# Patient Record
Sex: FEMALE | Race: WHITE | NOT HISPANIC OR LATINO | Employment: FULL TIME | URBAN - METROPOLITAN AREA
[De-identification: names, ages, dates, MRNs, and addresses within clinical notes are randomized per-mention and may not be internally consistent; named-entity substitution may affect disease eponyms.]

---

## 2017-03-01 DIAGNOSIS — Z30.41 ENCOUNTER FOR SURVEILLANCE OF CONTRACEPTIVE PILLS: ICD-10-CM

## 2017-03-01 RX ORDER — NORGESTIMATE AND ETHINYL ESTRADIOL 7DAYSX3 28
1 KIT ORAL DAILY
Qty: 84 TABLET | Refills: 0 | Status: SHIPPED | OUTPATIENT
Start: 2017-03-01 | End: 2017-05-22

## 2017-03-01 NOTE — TELEPHONE ENCOUNTER
Last office visit 2/25/16  Future appointment currently not scheduled.  BP Readings from Last 2 Encounters:   06/24/16 130/77   06/21/16 125/71     Medication is being filled for 1 time refill only due to:  Patient needs to be seen because it has been more than one year since last visit.   Left message for patient to return call to clinic.   Patient due for annual visit.  Prescription refilled for 1 fill.    Charity Connell   [facility]  RN

## 2017-05-22 DIAGNOSIS — Z30.41 ENCOUNTER FOR SURVEILLANCE OF CONTRACEPTIVE PILLS: ICD-10-CM

## 2017-05-22 NOTE — TELEPHONE ENCOUNTER
2/25/16 last office visit   Niecy refill generated.   Patient did not schedule follow up appointment.  Routing refill request to provider for review/approval because:  Niecy given x1 and patient did not follow up, please advise  Patient needs to be seen because it has been more than 1 year since last office visit.    Please review and advise.  Thank you.    Charity Connell   [facility]  RN

## 2017-05-23 RX ORDER — NORGESTIMATE AND ETHINYL ESTRADIOL 7DAYSX3 28
1 KIT ORAL DAILY
Qty: 84 TABLET | Refills: 0 | Status: SHIPPED | OUTPATIENT
Start: 2017-05-23 | End: 2017-05-26

## 2017-05-23 NOTE — TELEPHONE ENCOUNTER
Call to pt to notify of below.  Unable to reach.  Left message for pt to call back     Charity Connell   [facility]  RN

## 2017-05-26 RX ORDER — NORGESTIMATE AND ETHINYL ESTRADIOL 7DAYSX3 28
1 KIT ORAL DAILY
Qty: 84 TABLET | Refills: 0 | Status: SHIPPED | OUTPATIENT
Start: 2017-05-26 | End: 2017-08-18

## 2017-08-18 DIAGNOSIS — Z30.41 ENCOUNTER FOR SURVEILLANCE OF CONTRACEPTIVE PILLS: ICD-10-CM

## 2017-08-18 RX ORDER — NORGESTIMATE AND ETHINYL ESTRADIOL 7DAYSX3 28
1 KIT ORAL DAILY
Qty: 84 TABLET | Refills: 0 | Status: SHIPPED | OUTPATIENT
Start: 2017-08-18 | End: 2021-08-27

## 2017-08-18 NOTE — TELEPHONE ENCOUNTER
Last office visit 2/25/16  Last prescription 5/26/17  Patient bumped from appointment 8/3/17  Future appointment 9/19/17  Will refill med to get patient through to next appointment.    BP Readings from Last 2 Encounters:   06/24/16 130/77   06/21/16 125/71       Charity Connell   [facility]  RN

## 2017-09-19 ENCOUNTER — OFFICE VISIT (OUTPATIENT)
Dept: OBGYN | Facility: CLINIC | Age: 27
End: 2017-09-19
Payer: COMMERCIAL

## 2017-09-19 VITALS
WEIGHT: 173.4 LBS | HEIGHT: 64 IN | DIASTOLIC BLOOD PRESSURE: 76 MMHG | SYSTOLIC BLOOD PRESSURE: 132 MMHG | BODY MASS INDEX: 29.6 KG/M2 | HEART RATE: 97 BPM

## 2017-09-19 DIAGNOSIS — Z01.419 ENCOUNTER FOR GYNECOLOGICAL EXAMINATION WITHOUT ABNORMAL FINDING: Primary | ICD-10-CM

## 2017-09-19 DIAGNOSIS — Z30.09 FAMILY PLANNING COUNSELING: ICD-10-CM

## 2017-09-19 PROCEDURE — 99395 PREV VISIT EST AGE 18-39: CPT | Performed by: OBSTETRICS & GYNECOLOGY

## 2017-09-19 NOTE — NURSING NOTE
"Chief Complaint   Patient presents with     Physical       Initial /76 (BP Location: Right arm, Patient Position: Sitting, Cuff Size: Adult Regular)  Pulse 97  Ht 5' 4\" (1.626 m)  Wt 173 lb 6.4 oz (78.7 kg)  LMP 09/02/2017  Breastfeeding? No  BMI 29.76 kg/m2 Estimated body mass index is 29.76 kg/(m^2) as calculated from the following:    Height as of this encounter: 5' 4\" (1.626 m).    Weight as of this encounter: 173 lb 6.4 oz (78.7 kg).  Medication Reconciliation: complete   Vannessa Nelson CMA      "

## 2017-09-19 NOTE — PROGRESS NOTES
SUBJECTIVE:   CC: Melody aBron is an 27 year old woman who presents for preventive health visit.     Thinking of getting pregnant in the next year, has been  for about a year.  Has been on OCPs since her teens with regular periods.      Had all her vaccines as a child, had chicken pox as a child.    Healthy Habits:    Do you get at least three servings of calcium containing foods daily (dairy, green leafy vegetables, etc.)? yes    Amount of exercise or daily activities, outside of work: 2 day(s) per week    Problems taking medications regularly No    Medication side effects: No    Have you had an eye exam in the past two years? yes    Do you see a dentist twice per year? yes    Do you have sleep apnea, excessive snoring or daytime drowsiness?no          Family planning    Today's PHQ-2 Score: PHQ-2 ( 1999 Pfizer) 9/19/2017 2/25/2016   Q1: Little interest or pleasure in doing things 0 0   Q2: Feeling down, depressed or hopeless 0 0   PHQ-2 Score 0 0         Abuse: Current or Past(Physical, Sexual or Emotional)- No  Do you feel safe in your environment - Yes  Social History   Substance Use Topics     Smoking status: Never Smoker     Smokeless tobacco: Never Used     Alcohol use Yes      Comment: occ     The patient does not drink >3 drinks per day nor >7 drinks per week.    Reviewed orders with patient.  Reviewed health maintenance and updated orders accordingly - Yes  BP Readings from Last 3 Encounters:   09/19/17 132/76   06/24/16 130/77   06/21/16 125/71    Wt Readings from Last 3 Encounters:   09/19/17 173 lb 6.4 oz (78.7 kg)   04/15/16 155 lb (70.3 kg)   02/25/16 168 lb (76.2 kg)                      Mammogram not appropriate for this patient based on age.  Mammo discussed, not appropriate for or declined by this patient.    Pertinent mammograms are reviewed under the imaging tab.  History of abnormal Pap smear:   NO - age 21-29 PAP every 3 years recommended  Last 3 Pap Results:   PAP (no units)  "  Date Value   02/25/2016 NIL   10/31/2012 NIL   11/14/2011 NIL       Reviewed and updated as needed this visit by clinical staffTobacco  Allergies  Meds         Reviewed and updated as needed this visit by Provider              ROS:  C: NEGATIVE for fever, chills, change in weight  I: NEGATIVE for worrisome rashes, moles or lesions  E: NEGATIVE for vision changes or irritation  ENT: NEGATIVE for ear, mouth and throat problems  R: NEGATIVE for significant cough or SOB  B: NEGATIVE for masses, tenderness or discharge  CV: NEGATIVE for chest pain, palpitations or peripheral edema  GI: NEGATIVE for nausea, abdominal pain, heartburn, or change in bowel habits  : NEGATIVE for unusual urinary or vaginal symptoms. Periods are regular.  M: NEGATIVE for significant arthralgias or myalgia  N: NEGATIVE for weakness, dizziness or paresthesias  P: NEGATIVE for changes in mood or affect    OBJECTIVE:   /76 (BP Location: Right arm, Patient Position: Sitting, Cuff Size: Adult Regular)  Pulse 97  Ht 5' 4\" (1.626 m)  Wt 173 lb 6.4 oz (78.7 kg)  LMP 09/02/2017  Breastfeeding? No  BMI 29.76 kg/m2  EXAM:  GENERAL: healthy, alert and no distress  EYES: Eyes grossly normal to inspection, PERRL and conjunctivae and sclerae normal  HENT: normal cephalic/atraumatic and oral mucous membranes moist  NECK: no adenopathy, no asymmetry, masses, or scars and thyroid normal to palpation  RESP: lungs clear to auscultation - no rales, rhonchi or wheezes  BREAST: normal without masses, tenderness or nipple discharge and no palpable axillary masses or adenopathy  CV: regular rate and rhythm, normal S1 S2, no S3 or S4, no murmur, click or rub, no peripheral edema and peripheral pulses strong  ABDOMEN: soft, nontender, no hepatosplenomegaly, no masses and bowel sounds normal   (female): normal female external genitalia, normal urethral meatus, vaginal mucosa pink, moist, well rugated, and normal cervix/adnexa/uterus without masses or " "discharge  MS: no gross musculoskeletal defects noted, no edema  SKIN: no suspicious lesions or rashes  NEURO: Normal strength and tone, mentation intact and speech normal  PSYCH: mentation appears normal, affect normal/bright    ASSESSMENT/PLAN:   1. Encounter for gynecological examination without abnormal finding  Declines STI screening.  Pap smear up to date.  Mammogram not indicated today due to age.  Colonoscopy not indicated today due to age.  Labs not due today.  Immunizations up to date.     2. Family planning counseling  Recommend PNV when stopping OCPs.  On no other meds.  Discussed weight control, activity.  Had all immunizations as a child and chicken pox.      COUNSELING:   Reviewed preventive health counseling, as reflected in patient instructions       Regular exercise       Healthy diet/nutrition       Contraception       Family planning       Safe sex practices/STD prevention         reports that she has never smoked. She has never used smokeless tobacco.    Estimated body mass index is 29.76 kg/(m^2) as calculated from the following:    Height as of this encounter: 5' 4\" (1.626 m).    Weight as of this encounter: 173 lb 6.4 oz (78.7 kg).   Weight management plan: Discussed healthy diet and exercise guidelines and patient will follow up in 12 months in clinic to re-evaluate.    Counseling Resources:  ATP IV Guidelines  Pooled Cohorts Equation Calculator  Breast Cancer Risk Calculator  FRAX Risk Assessment  ICSI Preventive Guidelines  Dietary Guidelines for Americans, 2010  USDA's MyPlate  ASA Prophylaxis  Lung CA Screening    Nichole Horne MD  [facility]  "

## 2017-09-19 NOTE — MR AVS SNAPSHOT
After Visit Summary   9/19/2017    Melody Baron    MRN: [id number]           Patient Information     Date Of Birth          1990        Visit Information        Provider Department      9/19/2017 4:00 PM Nichole Horne MD [facility]        Today's Diagnoses     Encounter for gynecological examination without abnormal finding    -  1    Family planning counseling          Care Instructions      Preventive Health Recommendations  Female Ages 26 - 39  Yearly exam:   See your health care provider every year in order to    Review health changes.     Discuss preventive care.      Review your medicines if you your doctor has prescribed any.    Until age 30: Get a Pap test every three years (more often if you have had an abnormal result).    After age 30: Talk to your doctor about whether you should have a Pap test every 3 years or have a Pap test with HPV screening every 5 years.   You do not need a Pap test if your uterus was removed (hysterectomy) and you have not had cancer.  You should be tested each year for STDs (sexually transmitted diseases), if you're at risk.   Talk to your provider about how often to have your cholesterol checked.  If you are at risk for diabetes, you should have a diabetes test (fasting glucose).  Shots: Get a flu shot each year. Get a tetanus shot every 10 years.   Nutrition:     Eat at least 5 servings of fruits and vegetables each day.    Eat whole-grain bread, whole-wheat pasta and brown rice instead of white grains and rice.    Talk to your provider about Calcium and Vitamin D.     Lifestyle    Exercise at least 150 minutes a week (30 minutes a day, 5 days of the week). This will help you control your weight and prevent disease.    Limit alcohol to one drink per day.    No smoking.     Wear sunscreen to prevent skin cancer.    See your dentist every six months for an exam and cleaning.            Follow-ups after your visit        Who to contact   "   If you have questions or need follow up information about today's clinic visit or your schedule please contact [facility] directly at [phone].  Normal or non-critical lab and imaging results will be communicated to you by MyChart, letter or phone within 4 business days after the clinic has received the results. If you do not hear from us within 7 days, please contact the clinic through [facility] or phone. If you have a critical or abnormal lab result, we will notify you by phone as soon as possible.  Submit refill requests through [employer] or call your pharmacy and they will forward the refill request to us. Please allow 3 business days for your refill to be completed.          Additional Information About Your Visit        [facility] Information     [employer] gives you secure access to your electronic health record. If you see a primary care provider, you can also send messages to your care team and make appointments. If you have questions, please call your primary care clinic.  If you do not have a primary care provider, please call [phone] and they will assist you.        Care EveryWhere ID     This is your Care EveryWhere ID. This could be used by other organizations to access your [place] medical records  [id number]        Your Vitals Were     Pulse Height Last Period Breastfeeding? BMI (Body Mass Index)       97 5' 4\" (1.626 m) 09/02/2017 No 29.76 kg/m2        Blood Pressure from Last 3 Encounters:   09/19/17 132/76   06/24/16 130/77   06/21/16 125/71    Weight from Last 3 Encounters:   09/19/17 173 lb 6.4 oz (78.7 kg)   04/15/16 155 lb (70.3 kg)   02/25/16 168 lb (76.2 kg)              Today, you had the following     No orders found for display       Primary Care Provider Office Phone # Fax #    Nader Ribera [phone] [address] [facility] [address]        Equal Access to Services     MANOLO KO AH: evelyn To, " elida hills ah. So [facility] [phone].    ATENCIÓN: Si xochitl marie, tiene a manuel disposición servicios gratuitos de asistencia lingüística. Teodora al [phone].    We comply with applicable federal civil rights laws and [place] laws. We do not discriminate on the basis of race, color, national origin, age, disability sex, sexual orientation or gender identity.            Thank you!     Thank you for choosing [facility]  for your care. Our goal is always to provide you with excellent care. Hearing back from our patients is one way we can continue to improve our services. Please take a few minutes to complete the written survey that you may receive in the mail after your visit with us. Thank you!             Your Updated Medication List - Protect others around you: Learn how to safely use, store and throw away your medicines at www.disposemymeds.org.          This list is accurate as of: 9/19/17  4:59 PM.  Always use your most recent med list.                   Brand Name Dispense Instructions for use Diagnosis    doxycycline Monohydrate 100 MG Caps     120 capsule    Take 1 capsule (100 mg) by mouth 2 times daily (with meals)    Acne vulgaris       MULTIVITAL Tabs     30 tablet    1 tablet daily        mupirocin 2 % ointment    BACTROBAN    22 g    Apply daily to wound.    Allergic contact dermatitis, unspecified cause       norgestim-eth estrad triphasic 0.18/0.215/0.25 MG-35 MCG per tablet    ORTHO TRI-CYCLEN (28)    84 tablet    Take 1 tablet by mouth daily Last refill until clinic appointment.    Encounter for surveillance of contraceptive pills       * triamcinolone 0.5 % cream    KENALOG    15 g    Apply sparingly to affected area three times daily.        * triamcinolone 0.5 % Oint ointment    KENALOG    15 g    Apply sparingly twice daily to rash as needed.    Allergic contact dermatitis due to adhesives       vitamin B complex with  vitamin C Tabs tablet      Take 1 tablet by mouth daily        VITAMIN D PO      Take  by mouth. 1 tablet daily, unknown dose        * Notice:  This list has 2 medication(s) that are the same as other medications prescribed for you. Read the directions carefully, and ask your doctor or other care provider to review them with you.

## 2019-11-02 ENCOUNTER — HEALTH MAINTENANCE LETTER (OUTPATIENT)
Age: 29
End: 2019-11-02

## 2020-02-10 ENCOUNTER — HEALTH MAINTENANCE LETTER (OUTPATIENT)
Age: 30
End: 2020-02-10

## 2020-11-16 ENCOUNTER — HEALTH MAINTENANCE LETTER (OUTPATIENT)
Age: 30
End: 2020-11-16

## 2021-05-07 ENCOUNTER — IMMUNIZATION (OUTPATIENT)
Dept: FAMILY MEDICINE | Facility: CLINIC | Age: 31
End: 2021-05-07
Payer: COMMERCIAL

## 2021-05-07 PROCEDURE — 0011A PR COVID VAC MODERNA 100 MCG/0.5 ML IM: CPT

## 2021-05-07 PROCEDURE — 91301 PR COVID VAC MODERNA 100 MCG/0.5 ML IM: CPT

## 2021-06-04 ENCOUNTER — IMMUNIZATION (OUTPATIENT)
Dept: FAMILY MEDICINE | Facility: CLINIC | Age: 31
End: 2021-06-04
Attending: FAMILY MEDICINE
Payer: COMMERCIAL

## 2021-06-04 PROCEDURE — 0012A PR COVID VAC MODERNA 100 MCG/0.5 ML IM: CPT

## 2021-06-04 PROCEDURE — 91301 PR COVID VAC MODERNA 100 MCG/0.5 ML IM: CPT

## 2021-08-27 ENCOUNTER — LAB (OUTPATIENT)
Dept: LAB | Facility: CLINIC | Age: 31
End: 2021-08-27
Attending: ADVANCED PRACTICE MIDWIFE
Payer: COMMERCIAL

## 2021-08-27 ENCOUNTER — PRENATAL OFFICE VISIT (OUTPATIENT)
Dept: MIDWIFE SERVICES | Facility: CLINIC | Age: 31
End: 2021-08-27
Payer: COMMERCIAL

## 2021-08-27 ENCOUNTER — HOSPITAL ENCOUNTER (OUTPATIENT)
Dept: ULTRASOUND IMAGING | Facility: CLINIC | Age: 31
End: 2021-08-27
Attending: ADVANCED PRACTICE MIDWIFE
Payer: COMMERCIAL

## 2021-08-27 ENCOUNTER — DOCUMENTATION ONLY (OUTPATIENT)
Dept: MIDWIFE SERVICES | Facility: CLINIC | Age: 31
End: 2021-08-27

## 2021-08-27 VITALS
HEIGHT: 64 IN | SYSTOLIC BLOOD PRESSURE: 122 MMHG | DIASTOLIC BLOOD PRESSURE: 74 MMHG | HEART RATE: 76 BPM | BODY MASS INDEX: 33.97 KG/M2 | WEIGHT: 199 LBS

## 2021-08-27 DIAGNOSIS — Z34.01 ENCOUNTER FOR SUPERVISION OF NORMAL FIRST PREGNANCY IN FIRST TRIMESTER: Primary | ICD-10-CM

## 2021-08-27 DIAGNOSIS — Z34.01 ENCOUNTER FOR SUPERVISION OF NORMAL FIRST PREGNANCY IN FIRST TRIMESTER: ICD-10-CM

## 2021-08-27 LAB
ABO/RH(D): NORMAL
ANTIBODY SCREEN: NEGATIVE
ERYTHROCYTE [DISTWIDTH] IN BLOOD BY AUTOMATED COUNT: 13.2 % (ref 10–15)
HBA1C MFR BLD: 5.1 %
HCT VFR BLD AUTO: 33.7 % (ref 35–47)
HGB BLD-MCNC: 11.4 G/DL (ref 11.7–15.7)
HIV 1+2 AB+HIV1 P24 AG SERPL QL IA: NEGATIVE
MCH RBC QN AUTO: 29.3 PG (ref 26.5–33)
MCHC RBC AUTO-ENTMCNC: 33.8 G/DL (ref 31.5–36.5)
MCV RBC AUTO: 87 FL (ref 78–100)
PLATELET # BLD AUTO: 279 10E3/UL (ref 150–450)
RBC # BLD AUTO: 3.89 10E6/UL (ref 3.8–5.2)
SPECIMEN EXPIRATION DATE: NORMAL
WBC # BLD AUTO: 7.2 10E3/UL (ref 4–11)

## 2021-08-27 PROCEDURE — 86901 BLOOD TYPING SEROLOGIC RH(D): CPT

## 2021-08-27 PROCEDURE — 99204 OFFICE O/P NEW MOD 45 MIN: CPT | Performed by: ADVANCED PRACTICE MIDWIFE

## 2021-08-27 PROCEDURE — 83036 HEMOGLOBIN GLYCOSYLATED A1C: CPT

## 2021-08-27 PROCEDURE — 87591 N.GONORRHOEAE DNA AMP PROB: CPT | Performed by: ADVANCED PRACTICE MIDWIFE

## 2021-08-27 PROCEDURE — 36415 COLL VENOUS BLD VENIPUNCTURE: CPT

## 2021-08-27 PROCEDURE — 87389 HIV-1 AG W/HIV-1&-2 AB AG IA: CPT

## 2021-08-27 PROCEDURE — 87491 CHLMYD TRACH DNA AMP PROBE: CPT | Performed by: ADVANCED PRACTICE MIDWIFE

## 2021-08-27 PROCEDURE — 86803 HEPATITIS C AB TEST: CPT

## 2021-08-27 PROCEDURE — 85027 COMPLETE CBC AUTOMATED: CPT

## 2021-08-27 PROCEDURE — 87086 URINE CULTURE/COLONY COUNT: CPT | Performed by: ADVANCED PRACTICE MIDWIFE

## 2021-08-27 PROCEDURE — 86762 RUBELLA ANTIBODY: CPT

## 2021-08-27 PROCEDURE — 99207 PR FIRST OB VISIT: CPT | Performed by: ADVANCED PRACTICE MIDWIFE

## 2021-08-27 PROCEDURE — 76801 OB US < 14 WKS SINGLE FETUS: CPT

## 2021-08-27 PROCEDURE — 87624 HPV HI-RISK TYP POOLED RSLT: CPT | Performed by: ADVANCED PRACTICE MIDWIFE

## 2021-08-27 PROCEDURE — 86780 TREPONEMA PALLIDUM: CPT

## 2021-08-27 PROCEDURE — 87340 HEPATITIS B SURFACE AG IA: CPT

## 2021-08-27 PROCEDURE — G0123 SCREEN CERV/VAG THIN LAYER: HCPCS | Performed by: ADVANCED PRACTICE MIDWIFE

## 2021-08-27 RX ORDER — FOLIC ACID/MULTIVIT,IRON,MINER 0.4MG-18MG
TABLET ORAL
COMMUNITY
Start: 2021-07-17 | End: 2023-09-18

## 2021-08-27 ASSESSMENT — MIFFLIN-ST. JEOR: SCORE: 1584.72

## 2021-08-27 NOTE — PROGRESS NOTES
PRENATAL VISIT   FIRST OBSTETRICAL EXAM - OB    Assessment / Impression     First prenatal visit at 11w3d    BMI 34      Plan:       - IOB labs drawn., Accepts GC/CT screening,  Pap smear screening performed today and hgbA1C  -Pt is low risk and not interested in drawing lead level.  -Reviewed prenatal care schedule and discussed routine OB visits versus problem visits and referrals. Also discussed use of ultrasound in pregnancy.  -ordered early US; reviewed dating. Dating by LMP.   -Optimal nutrition and weight gain discussed. Pregnancy weight gain of 11-20 lbs (BMI 30.0 or 34.9) encouraged.   -Clinical history/risk factors requiring antepartum OB consult: none.   -Preeclampsia risk factors:    High risk factors (1+):  none.     Moderate risk factors (2+):  none, first pregnancy and BMI greater than 30    Based on her risk factors, Melody is NOT at high risk of preeclampsia. Low-dose aspirin prophylaxis is NOT recommended for prevention of preeclampsia.  -Antepartum VTE risk factors none absent.  -Reviewed genetic carrier screening. Patient considering: .  -Reviewed genetic aneuploidy screening options. Patient  considering: . Encouraged to call insurance to verify coverage.  -The following referrals were given to patient for follow up: .   -Reviewed MyChart, lab results, and how lab results are disseminated.   -The patient has the following risk factors for overt diabetes: Body mass index greater than or equal to 25 kg/m2. Plus the additional risk factor(s) of: No additional risk factors.    -IOB packet given and reviewed with patient, discussed standards of care, scope of practice, clinic and hospital settings, also reviewed clinic versus emergency phone number.   -Reviewed importance of regular exercise in pregnancy and help with low back pain and other pregnancy symptoms.   -Discussed importance of taking aprenatal vitamin with the goal of having 400 mcg of folic acid. Additionally taking a Vitamin D  supplement (1,000-2,000 IU/day) and an Omega 3/fish oil/DHA is beneficial. Research also supports taking 150 mcg of Iodine when pregnant.  -Discussed baby friend hospitals, policies, and recommendations regarding breastfeeding as well as professional and community support. Discussed the benefits of breastfeeding including: decreased childhood obesity or diabetes, decreased recurrence of ear infections, and decreased chance of hospitalization for respiratory conditions  Additionally, giving  formula instead of breast milk can affect the mother's supply. And formula alters the natural growth of good bacteria in the 's stomach.   -Anticipatory guidance for common pregnancy questions and concerns reviewed.   -Danger s/sx for this trimester reviewed with patient.  -CNM services and hospital options reviewed; emergency and scheduling phone numbers given to patient.  -Return to clinic 4-6 weeks    Time spent:  Chart review/Pre-charting on Date: 21: 5 minutes  Face-to-face visit: 45 minutes with >50% on education, counseling and coordination of care.   Documentation:  5 minutes  Total time spent on day of service:  55 minutes       Subjective:      Melody Babcock is a 31 year old  here today for her First Obstetrical Exam.  She is new to [place] and [facility]. Open to pregnancy for past couple years.   Off OCPs for several years, 28 day cycles, mild sx.   Noting nausea, occasional vomiting.   Notes chronic urinary urgency.    Notes referred by her best friend and sister.  Reports lactose intolerance with diarrhea; managing with increased hydration and pedialyte. Resolves within 1 day.    Exercise: 1 day a week, walking  Safety (seatbelt, gun access, IPV, hx abuse): negative screen  Tobacco/Alcohol/Drug Use: none  EPDS today: 3, neg #10  Sexual Partners: 1 male, spouse  Last Breast Exam: 5 years ago    Education level: [employer] grad  Occupation:   Partners name: Timoteo    OB History     "Para Term  AB Living   1 0 0 0 0 0   SAB TAB Ectopic Multiple Live Births   0 0 0 0 0      # Outcome Date GA Lbr Darvin/2nd Weight Sex Delivery Anes PTL Lv   1 Current                Expected Date of Delivery: 3/15/2022, by Last Menstrual Period    Past Medical History:   Diagnosis Date     Allergic urticaria      Past Surgical History:   Procedure Laterality Date     AS BIOPSY SKIN/SUBQ/MUC MEM, SINGLE LESION      mole removal     HC TOOTH EXTRACTION W/FORCEP       Social History     Tobacco Use     Smoking status: Never Smoker     Smokeless tobacco: Never Used   Substance Use Topics     Alcohol use: Not Currently     Comment: occ     Drug use: No     Current Outpatient Medications   Medication Sig Dispense Refill     Docosahexaenoic Acid (PRENATAL DHA) 200 MG capsule        Prenatal Vit-Fe Fumarate-FA (PRENATAL PO)        Allergies   Allergen Reactions     Sulfa Drugs      Vicodin [Acetaminophen]      Adhesive Tape Rash     Bacitracin Rash     Patient developed rash with use of bacitracin and had positive reaction with true test     Nickel Rash     Positive reaction with true test.             Pregnancy Risk Factors: see problem list    Review of Systems  General:  Denies problem  Eyes: Denies problem  Ears/Nose/Throat: Denies problem  Cardiovascular: Denies problem  Respiratory:  Denies problem  Gastrointestinal:  Denies problem  Genitourinary: Denies problem  Musculoskeletal:  Denies problem  Skin: Denies problem  Neurologic: Denies problem  Psychiatric: Denies problem  Endocrine: Denies problem  Heme/Lymphatic: Denies problem   Allergic/Immunologic: Denies problem       Objective:   Objective    Vitals:    21 1014   BP: 122/74   Pulse: 76   Weight: 90.3 kg (199 lb)   Height: 1.613 m (5' 3.5\")     Physical Exam:  General Appearance: Alert, cooperative, no distress, appears stated age  Head: Normocephalic, without obvious abnormality, atraumatic  Eyes: Conjunctiva/corneas clear, does not wear corrective " lenses  Neck: Supple, symmetrical, trachea midline, no adenopathy  Thyroid: not enlarged, symmetric, no tenderness/mass/nodules  Back: Symmetric, no curvature, ROM normal, no CVA tenderness  Lungs: Clear to auscultation bilaterally, respirations unlabored  Heart: Regular rate and rhythm, S1 and S2 normal, no murmur, rub, or gallop  Breasts:  deferred   Abdomen: Soft, non-tender, no masses. Gravid to 11  FHT: 175 bpm   Pelvic exam: External genitalia normal without lesions or irritation. Vagina and cervix with small amount thin white homogenous discharge show no lesions, inflammation, or tenderness. No cystocele, No rectocele. Uterus & adnexal normal without masses or tenderness pap and GC/CT samples obtained   Extremities: Extremities normal, atraumatic, no cyanosis or edema  Skin: Skin color, texture, turgor normal, no rashes or lesions  Lymph nodes: Cervical, supraclavicular, and axillary nodes normal  Neurologic: Alert and oriented x 3.    Lab: No results found for any visits on 08/27/21.

## 2021-08-27 NOTE — PATIENT INSTRUCTIONS
"Welcome to [facility]   and thank you for choosing us for your maternity care provider!  Congratulations!    Meet the Midwives from [facility]  You are invited to an informational meet and greet with [facility]'s [facility] Certified Nurse-Midwives. Our free \"Meet the Midwives\" event is a great opportunity to learn about our midwives' philosophy and experience, the hospitals where we can assist with your birth, and answer questions you may have. Partners, friends, and family are welcome to attend. Currently, this is a virtual event.  Date  First Tuesday of every month at 7 pm.    Link to next (live) meeting  [email or web address]  To Join by Telephone (audio only) Call:   [phone] Phone Conference ID: [phone]#    Contact information:  Appointment line and to get a hold of CNM in clinic Monday-Friday 8 am - 5 pm:  [phone].  There are some clinics with early start times (1st appointment 7:40 am) and others with evening hours (last appointment 6:20 pm).  Most are typically open from 8 am to 5 pm.    CNM on call answering service: [phone].  Specify your hospital of choice and leave a brief message for CNM;  will then page CNM who is on call at your specified hospital and you should receive a call back with 15 minutes.  Be sure that your ringer is audible and that you can accept blocked calls so that we can get back in touch with you! This number should be reserved for urgent needs if during the day, before 8 am, after 5 pm, weekends, holidays.      Pregnancy: Body Changes  From conception (fertilization) until after the birth of your child, you and your baby will change every day. To help you understand what is happening, we ve outlined how pregnancy begins and some of the changes you may notice.  How Pregnancy Begins  Conception is the union of a sperm and an egg. " When it occurs, your baby s genetic makeup is complete, even its sex. Fertilization takes place in the fallopian tube. The fertilized egg then travels down this tube to the uterus (womb). The egg attaches to the lining of the uterus about a week later. There it grows and is nourished.    Your Changing Body  Pregnancy affects almost every part of your body. You may notice some of the following physical and emotional changes:    Your uterus expands outward and upward as your baby grows. You may feel pressure on your bladder, stomach, and other organs.    You may notice skin color changes on your forehead, nose, and cheeks. A dark line may form from your bellybutton down to your pubic area. The skin color around your nipples and thighs may also change.    Pink stretch marks may appear on your abdomen, breasts, or hips.    Your hair may seem thicker. You lose less hair during pregnancy.    You may feel fine one day and weepy the next. This is caused by changes in your body, such as increased hormones (chemicals that affect the function of certain organs and also your moods).      Adapting to Pregnancy: First Trimester  As your body adjusts, you may have to change or limit your daily activities. You ll need more rest. You may also need to use the energy you have more wisely.  Eat stomach-friendly foods like cottage cheese, crackers, or bread throughout the day.    Your Changing Body  Almost every part of your body is affected as you adapt to pregnancy. The uterus and cervix will begin to soften right away. You may not look very pregnant during the first three months. But you are likely to have some common signs of early pregnancy:    Nausea    Fatigue    Frequent urination    Mood swings    Bloating of the abdomen    Missed or light periods (first trimester bleeding)    Nipple or breast tenderness, breast swelling      It s Not Too Late to Start Good Habits  What matters most is protecting your baby from this moment on.  If you smoke, drink alcohol, or use drugs, now is the time to stop. If you need help, talk with your health care provider.    Smoking increases the risk of stillbirth or having a low-birth-weight baby. If you smoke, quit now.    Alcohol and drugs have been linked with miscarriage, birth defects, intellectual disability, and low birth weight. Do not drink alcohol or take drugs.    Tips to Relieve Nausea  Although nausea can occur at any time of the day, it may be worse in the morning. To help prevent nausea:    Eat small, light meals at frequent intervals.    Get up slowly. Eat a few unsalted crackers before you get out of bed.    Drink water with lemon slices.    Eat an ice pop in your favorite flavor.    Ask your health care provider about taking angus or vitamin B6 for nausea and vomiting.    Talk with your health care provider if you take vitamins that upset your stomach.    Work Concerns  The end of the first trimester is a good time to discuss working during pregnancy with your employer. Follow your health care provider s advice if your job requires you to stand for a long time, work with hazardous tools, or even sit at a desk all day. Your workspace, workload, or scheduled hours may need to be adjusted. Perhaps you can change body postures more often or take an extra break.  Advice for Travel  Talk to your health care provider first, but the second trimester may be the best time for any travel. You may be advised to avoid certain trips while you re pregnant. Food and water can be concerns in developing countries. Travel by car is a good choice, as you can stop, get out, and stretch. Bring snacks and water along. Fasten the lap belt below your belly, low over your hips. Also be sure to wear the shoulder harness.  Intimacy  Unless your health care provider tells you to, there is no reason to stop having sex while you re pregnant. You or your partner may notice changes in desire. Desire may be less in the first  trimester, due to nausea and fatigue. In the second trimester, sex may be very enjoyable. The third trimester can be a challenge comfort-wise. Try different positions and see what s best for you both.      Pregnancy: Your First Trimester Changes  The first trimester is a time of rapid development for your baby. Because your baby is growing so quickly, it is important that you start a healthy lifestyle right away. By the end of the first trimester, your baby has formed all of its major body organs and weighs just over an ounce.    Month 1 (Weeks 1-4)  The placenta (the organ that nourishes your baby) begins to form. The heart and lungs begin to develop. Your baby is about 1/4 inch long by the end of the first month.    Month 2 (Weeks 5-8)  All of your baby s major body organs form. The face, fingers, toes, ears, and eyes appear. By the end of the month, your baby is about 1 inch long.    Month 3 (Weeks 9-12)  Your baby can open and close its fists and mouth. The sexual organs begin to form. As the first trimester ends, your baby is about 4 inches long.      Pregnancy: Your Weight  Being a healthy weight is important for both you and your baby. The weight you gain now is not just extra fat. It is also the weight of your baby. And it is the increased blood and fluids to support the baby. A slow, steady rate of gain is best. How much you should gain depends on your weight before getting pregnant. Check with your health care provider to find out what is right for you.    If You Gain Too Much  Gaining too much weight might cause you to feel tired or you could have a harder pregnancy or birth. If you and your health care provider decide you re gaining too much:    Eat fewer fats and sugars. Instead, eat fruit, vegetables, and whole-grain foods.    Drink plenty of water between meals.    Get at least 20 minutes of light exercise, such as walking, each day.    Don t diet. You might not get enough of the nutrients you or your  baby needs.    Keep a diet diary to help you gauge what and how much you are eating .    If You re Not Gaining Enough  If you don t gain enough, your baby could be too small or have health problems. Women tend to gain most of their weight in the second and third trimesters. For now:    Eat many types of foods. Make sure you get enough calcium, protein, and carbohydrates.    Don t skip meals.    Eat healthy snacks.    Pick nutrient-dense, high calorie healthy food like trail mix or protein shakes.    See a dietitian for help.    Talk to your healthcare provider if you have had an eating disorder or problems with certain foods.      Pregnancy: Common Questions  There are plenty of myths and  old wives  tales  surrounding pregnancy. You may need help  fact from fiction. On this sheet, you ll find answers to a few common questions. If you have other questions, talk with a midwife.    Will Working Harm My Baby?  In most cases, working throughout your pregnancy is not harmful at all. There may be concerns if the job involves dangerous machinery or chemicals, lifting, or standing for very long periods of time. Talk to your health care provider and employer about your particular job and pregnancy.  Why Can t I Change the Cat Litter Box?  Cats carry a disease called toxoplasmosis. In adult humans, it shows up as a mild infection of the blood and organs. If you are infected during pregnancy, the baby s brain and eyes could be damaged. To be safe, have someone else change the litter. If you must handle it, wear a paper mask over your nose and mouth. Also, wear gloves and wash your hands afterward.  Which Medications Are Safe?  No prescription or over-the-counter drug is safe for everyone all of the time. But sometimes medications are needed. Be sure your health care provider knows you are pregnant. Then use only the medications he or she advises you to take. Please refer to the below resources for further information  and discuss concern and questions with your midwife.  Is It True That I Can Overheat My Baby?  Yes. To avoid making your baby too warm:    Don t sit in a jacuzzi. A long, warm bath is fine, but not in water over 100 F.    Exercise less intensely if you feel fatigued. Base your workout on how you feel, not your heart rate. Heart rates aren t a good way to measure effort during pregnancy.  Can I Lift and Carry Safely?  Yes, if your health care provider doesn t tell you otherwise. Learn to lift and carry safely to avoid injury and reduce back pain during pregnancy. To protect your back:    Bend at the knees to bring the load nearer.    Get a good . Test the weight of the load.    Tighten your abdomen. Exhale as you lift.    Lift with your legs, not with your back.    Carry the load close to your body.    Hold the load so you can see where you are going.  What If I Get Sick?  Most women get sick at least once during pregnancy. Talk with your health care provider if you do. Most likely it will not affect your pregnancy. Get plenty of rest and fluids, and eat what you can. Talk to your health care provider before taking any medications.        HEALTHY PREGNANCY CARE: 10-14 WEEKS PREGNANT     By weeks 10 to 14 of your pregnancy, the placenta has formed inside your uterus. It may be possible to hear your baby's heartbeat with a doppler ultrasound device. Your baby's eyes can and do move. The arms and legs can bend.    GENETIC SCREENING OPTIONS AT [facility]                All testing is optional. We don t recommend or discourage any test; it is totally up to you and your partner. Some couples wish to know their risk of having a baby with a genetic defect and others do not. We will support your decision. Abnormal results may lead to a discussion of options for further testing.    Accurate dating of your pregnancy is important for all testing so an ultrasound may be done prior to referral or testing.    No testing  provides certainty; there are false positives and negatives associated with all testing, some more than others.    Most genetic testing is non-invasive (requires only a blood sample and sometimes an ultrasound or both).    It is always wise to check with your insurance carrier before proceeding.    Some testing can be done at our lab and some require a referral.    If you decide to do no testing, the 20 week ultrasound scan, which is a routine or standard ultrasound, has some ability to detect abnormalities in the baby, and identifies obstetric problems.    If you are over 35, you will have the option of a Level II ultrasound, which is a more detailed and targeting scan, that helps detect fetal anomalies as well as obstetric problems.      If you have a more complex family history of chromosomal abnormalities, a referral to a genetic counselor and/or a Maternal Fetal Medicine specialist, to help identify available tests, may be recommended.    TYPES OF GENETIC TESTING AVAILABLE INCLUDE:    Carrier Screening/Testing for Genetic Conditions    There are many inherited conditions for which testing or carrier testing is available. Carrier screening can test for conditions like Cystic Fibrosis, Thalassemia, Jj Sachs, Sickle Cell, Hemophilia, Muscular Dystrophy, Ephraim s disease and many others.     Cystic Fibrosis (CF) affects both males and females and people from all racial and ethnic groups. However, the disease is most common among Caucasians of Northern  descent. CF is also common among Latinos and American Indians. The disease is less common among  Americans and  Americans. More than 10 million Americans are carriers of a faulty CF gene and many of them don't know that they are CF carriers. One or both parents can be tested any time before or during pregnancy.    Talk to your care provider about your family history and whether you should be screened. A referral to a genetic counselor at  [facility] or [facility] can be made by your care provider at any time.    This is also tested for in the  Metabolic Screen that your infant receives 24 hours after birth.     Fetal DNA cell Testing: [place] or Innatal (Non-Invasive Prenatal Testing)    At 10 wk or greater, a blood sample can be drawn here at clinic or at any of our referral offices. It will provide highly accurate results with low false positive rates for trisomy 18, trisomy 21 (Down Syndrome), and trisomy 13 (> 99% trisomy detection rate at a false positive rate of <0.1%). Gender identification can also be obtained if desired (98% accuracy).  Can also detect some sex-linked chromosomal abnormalities (80-90% accuracy).  This is often done if one of the other screening tests is abnormal.        1st Trimester Screening:     Everyone has an age related risk of having a baby with a genetic abnormality. This testing provides a risk profile which is better than assigning risk based solely on your age.    Refines your risk of having a baby with a chromosomal abnormality such as Trisomy 21 & 18.    This test with detect a fetus with one of these disorders about 85% of the time.    A thickened nuchal fold can also be associated with cardiac defects.    This test requires a blood collection combined with ultrasound to obtain a measurement of fluid at back of baby s neck (nuchal translucency).    False positive results occur approximately 5% of cases.    An additional blood sample may be necessary in order to calculate your risk of having a baby with a neural tube defect (e.g. spina bifida).  This is called the AFP test (alpha fetal protein).  An ultrasound should be able to  any spinal defect as well.    Follow-up of an abnormal test may include a more extensive ultrasound study and you may be offered an amniocentesis (a small sample of amniotic fluid is withdrawn and studied) for a definitive diagnosis    Quad Screen  (4-marker screen)    Between 15 and 21 weeks, a sample of blood can be drawn at our lab to assess your risk of having a baby with Down Syndrome, Trisomy 18 and neural tube defects. Such testing is able to detect these conditions in 80% of cases and the false-positive rate is approximately 5%.     Follow-up of an abnormal test may include a more extensive ultrasound study and you may be offered an amniocentesis (a small sample of amniotic fluid is withdrawn and studied) for a definitive diagnosis      Referrals opportunities include:    [facility],  [facility]  o Offers nuchal translucency ultrasound; does not offer genetic counseling.    [facility] and [facility]):   o Approximately an hour long visit includes 30 min with genetic counselor who discusses all testing available and which ones might be beneficial to you based on age, personal and family history.    o Blood will be drawn and the nuchal translucency ultrasound will be discussed and performed if desired. The Free Fetal DNA testing ([place]/[employer]) can be drawn also.  o Targeted or detailed Level II ultrasounds are also available with these perinatology groups.        Breastfeeding: a Healthy Option for You and Your Baby  Consider breastfeeding for the healthiest way to feed your baby. Ask your midwife or physician for more information.     The choice of how you will feed your baby is important.  Before your baby s birth, you ll want to learn about the benefits of breastfeeding.  [facility] have been designated Baby Friendly; an initiative that was created by the World Health Organization and UNICEF.  This helps give you and your baby the best start in feeding their baby.    Why should I breastfeed my baby?    Babies are less likely to develop childhood obesity or diabetes    Babies are less likely to suffer from recurrent ear infections    Babies are less likely  to be hospitalized for respiratory conditions    Breast milk is rich in nutrients and antibodies-it is easy to digest    How does it benefit me?    Lowers the risk for diabetes, breast and ovarian cancer and postpartum depression    Moms can lose  baby weight  more quickly    Cost savings - formula can cost well over $1,500 per year    Convenient - no bottles and nipples to sterilize, no measuring and mixing formula    The physical contact with breastfeeding can make babies feel secure, warm and comforted     What about formula?  While you and your baby are staying with us at [facility], we will support whatever feeding choice you make for your baby.    Some important considerations:      The American Academy of Pediatrics, the World Health Organization, and many more organizations recommend exclusive breastfeeding for 6 months and continued breastfeeding while adding other foods for the first 1-2 years.      Any amount of breastmilk has benefits to both baby and mother.    Giving formula in replacement of breastfeeding can affect mother s milk supply.  If formula is needed, hospital staff will work with you on a plan to help develop your milk supply.    Formula alters the natural growth of good bacteria in the  stomach.     Research has found that first time mothers who offer formula in the hospital have a shorter duration of breastfeeding.    How can I start to prepare?     Start by having a conversation with your medical provider.     Talk with your partner, family and friends.     Attend a prenatal class that includes breastfeeding preparation. Birth and breastfeeding classes are offered by [facility]. Visit [employer] for class information.     After your baby s birth, hospital staff and lactation consultants will help you and your baby get off to a great start with breastfeeding.    As your center of gravity and weight changes, use good body mechanics when changing positions and  lifting. For example, use a straight back and your legs for support when lifting instead of bending over. Maintain good posture to prevent straining your muscles. Now is a good time to continue or restart your exercise program. Walking 30-60 minutes daily is an excellent way to keep fit. Yoga and swimming also offer many benefits.    The nausea and fatigue of early pregnancy have usually started to let up, so this is a good time to focus on nutrition. Consider attending a nutrition class. A healthy diet includes about 60 grams of protein each day (3-4 servings of dairy, 2-3 servings of meat/fish/poultry/nuts), 4-6 servings of whole grain foods, and 5-6 servings of fruits and vegetables. Remember to drink 6-8 glasses of water daily.    Watch for warning signs, such as     vaginal bleeding    fluid leaking from your vagina    severe abdominal pain    nausea and vomiting more than 4-5 times a day, or if you are unable to keep anything down    fever more than 100.4 degrees F.       RESOURCES   You can refer to the Starting Out Right book or find it online at [email or web address] or [email or web address]/images/stories/flipbooks/[email or web address]-starting-out-[email or web address]-starting-out-right.html#p=8    You can sign up for a weekly parenting e-mail that gives support, tips and advice from health care professionals that starts with pregnancy and continues through the toddler years. To register, go to [email or web address] at any time during your pregnancy.    Breastfeeding:    OUTPATIENT LACTATION RESOURCES     -Schedule an appointment with a [facility] Nurse Midwives [facility] who is also a Lactation Consultant by calling [phone]. We see women for breastfeeding visits at [facility] and [facility].     -Baby Café    Pregnant and interested in breastfeeding?  Need answers to breastfeeding questions?  Want to help  breastfeeding moms?  Already breastfeeding and want to meet other moms?    Join us at the Baby Café!    Baby Cafe is a free, drop-in service offering breast-feeding support for pregnant women, breast-feeding mothers and their families.  Come share tips and socialize with other mothers.  Babies and siblings are welcome (no childcare available).    Starting April 2018, Baby Café will be at 4 locations.  Please see below for the Baby Café closest to you! Hmong, Sinhala, and Belgian which is may be available at some sites.      [facility]  [address] [place] [address]  1st Wednesday: 10am-12pm    [facility]  [address] [place] [address]  3rd Wednesday 4-6pm    [facility]  [address] [place] [address]  4th Wednesday 10am-12:30pm    [facility]  [address] [place] [address]  4th Wednesdays: 4-6pm    -Attend a baby weigh in at [facility].  Lactation consultants are available to answer questions  [place]: Tuesdays 1:00 - 2:00  [facility]: Mondays 1:00 - 2:00   www.[employer]Tagoodies    -Attend one of the New Mama groups at [facility] in [facility].  [facility] also offers one-on-one in home and in office lactation consults.   [email or web address]    -Attend a [facility] meeting.  Multiple groups in several locations throughout the [facility]. The meetings are no-cost and always informative breastfeeding education session through [employer] La Leche League  [email or web address].[email or web address]/     Held at [facility] the second Thursday of each month at 7pm    Childbirth and Parenting Education:   [place] parenting [place]: http://[employer]Tagoodies/   (565) 436-BABY  [place]: (education, yoga & wellness) www.[employer].Tagoodies  [facility]: www.[employer]ma[employer] Devices   Childbirth collective: (Parent topic nights)  www.childbirthcollective.org/  Hypnobabies:  www.hypnobabiestwincities.com/  Hypnobirthing:   Http://Weftng.com/  The Birth Hour: https://theGlider.Weilos/online-childbirth-class/    APPS and Podcasts:   Jossy Wood  The Birth Hour (for birth stories)   Birthful   Expectful   The Longest Shortest Time  PregnancyPodcast Yarely Briandacolleen  Book Recommendations:   Elizabeth Hellen's Birthing From Within--first few chapters include a new-age tone, you may prefer to skip it and keep going, because there is good stuff later.  This book recommendation covers emotional preparation, but does cover coping with pain, and use of both pharmacological and nonpharmacological methods.    Dr. Hogan' The Pregnancy Book and The Birth Book--the pregnancy book goes month-by month    Womanly Art of Breastfeeding by La L[employer]   Bestfeeding by Elisa Lu--great pictures    Mothering Your Nursing Toddler, by Mabel Talamantes.   Addresses dealing with so many of the challenging behaviors of a nursing toddler.  How Weaning Happens, by La Leche League.  Discusses weaning at all ages, from medically necessary weaning of an infant, all the way up to age 5 (or older), with why/why not, and strategies.  Very empowering book both for deciding to wean and deciding not to.    American College of Nurse-Midwives (ACNM) http://www.midwife.org/; look at the informational handouts at http://www.midwife.org/Share-With-Women     www.mymidwife.org    Mother to Baby (Medication and Herbal guidance in pregnancy): http://www.mothertobaby.org  Toll-Free Hotline: 511.954.5562  LactMed (Medication use while breastfeeding): http://toxnet.nlm.nih.gov/newtoxnet/lactmed.htm    Women's Health.gov:  http://www.womenshealth.gov/a-z-topics/index.html    American pregnancy association - http://americanpregnancy.org    Centering Pregnancy (group prenatal care option): http://centeringhealthcare.org    Information about doulas:  Childbirth collective: http://www.childbirthcollective.org/  [facility] [place] ([place]):   "www.marcellus.org  [facility]  project: http://BirdDog Solutionstiesdoulaproject.com/     Early Childhood and Family Education (ECFE):  ECFE offers parents hands-on learning experiences that will nourish a lifetime of teachable moments.  http://ecfe.info/ecfe-home/    [employer] www.D[employer]     FDA - Nutrition  www.mypyramid.gov  Under \"For Consumers,\" click on \"pregnant and breastfeeding women.\"      Centers for Disease Control and Prevention (CDC) - Vaccines : http://www.cdc.gov/vaccines/       When researching information on the web, question the validity of websites.  The [employer] .gov, .Yoyocard and[employer] Softwareorg tend to be more reliable information.  If there are a lot of advertisements, be cautious of the information provided. Stay away from blogs and chat rooms please!      Nutrition & supplements:     Prenatal vitamin (those with 600-1000 mcg folic acid and 27 mg of iron are enough).  Take with food or Juice     4-5 servings of dairy or other calcium rich foods (fish, leafy greens, soy) per day - if not, take 500-1000 mg additional calcium (Tums, pills, chews). Take with dairy     Vitamin D3 2801-9094 IU geltab daily.  Take with fattiest meal.  Look for fortified foods also (Dairy, Juice)     2-3 (4) oz servings of fish, seafood, nuts (walnuts & almonds), oils, avocado per week - if not, take Omega 3 Fatty acids: DHA & CASS 3140-9562 mg per day.  Other names: cod liver oil, fish oil. Take with fattiest meal.  Some prenatals have DHA, but typically not a sufficient dose.    Fish: Do not eat shark, swordfish, magen mackerel, or tilefish when you are pregnant or breastfeeding.  They contain high levels of mercury.  Limit white (albacore) tuna to no more than 6 ounces per week. Http://www.fda.gov/downloads/ForConsumers/ConsumerUpdates/NYA581773.pdf         Touring the Maternity Care Center  At this time we are offering a virtual tour of the Maternity Care Centers at both [facility] and [facility]:   [facility]: " [email or web address]://[email or web address]/Maternity-[email or web address]  [place]:   [email or web address]/the-birthplace/tours  https://[email or web address]/[facility]/Maternity-Care-Center/#[email or web address]_tour  When in person tours become available, registrations is required. To schedule a tour at either [facility] or [facility], please do so online using the following links:  [facility] - [email or web address]  [facility] - [email or web address]     You are invited to  Meet the [facility] Nurse Midwives [facility]    Virtual:   [email or web address]    A way to tour the hospital Labor and Delivery unit and meet the midwives in our group was postponed at the start of hospital restrictions following COVID-19. We will resume these when able.    Please call [phone] for ongoing updates.

## 2021-08-28 LAB
BACTERIA UR CULT: NO GROWTH
HBV SURFACE AG SERPL QL IA: NONREACTIVE
T PALLIDUM AB SER QL: NEGATIVE

## 2021-08-29 LAB
C TRACH DNA SPEC QL PROBE+SIG AMP: NEGATIVE
N GONORRHOEA DNA SPEC QL NAA+PROBE: NEGATIVE

## 2021-08-30 LAB
HCV AB SERPL QL IA: NEGATIVE
RUBV IGG SERPL QL IA: POSITIVE

## 2021-08-31 LAB
HUMAN PAPILLOMA VIRUS 16 DNA: NEGATIVE
HUMAN PAPILLOMA VIRUS 18 DNA: NEGATIVE
HUMAN PAPILLOMA VIRUS FINAL DIAGNOSIS: NORMAL
HUMAN PAPILLOMA VIRUS OTHER HR: NEGATIVE

## 2021-09-03 LAB
BKR LAB AP GYN ADEQUACY: NORMAL
BKR LAB AP GYN INTERPRETATION: NORMAL
BKR LAB AP HPV REFLEX: NORMAL
BKR LAB AP PREVIOUS ABNORMAL: NORMAL
PATH REPORT.COMMENTS IMP SPEC: NORMAL
PATH REPORT.RELEVANT HX SPEC: NORMAL

## 2021-09-12 ENCOUNTER — HEALTH MAINTENANCE LETTER (OUTPATIENT)
Age: 31
End: 2021-09-12

## 2021-09-13 ENCOUNTER — OFFICE VISIT (OUTPATIENT)
Dept: FAMILY MEDICINE | Facility: CLINIC | Age: 31
End: 2021-09-13
Payer: COMMERCIAL

## 2021-09-13 VITALS
HEIGHT: 64 IN | DIASTOLIC BLOOD PRESSURE: 60 MMHG | HEART RATE: 88 BPM | BODY MASS INDEX: 33.32 KG/M2 | OXYGEN SATURATION: 97 % | SYSTOLIC BLOOD PRESSURE: 100 MMHG | WEIGHT: 195.2 LBS

## 2021-09-13 DIAGNOSIS — Z13.220 LIPID SCREENING: Primary | ICD-10-CM

## 2021-09-13 LAB
CHOLEST SERPL-MCNC: 267 MG/DL
FASTING STATUS PATIENT QL REPORTED: YES
HDLC SERPL-MCNC: 51 MG/DL
LDLC SERPL CALC-MCNC: 186 MG/DL
TRIGL SERPL-MCNC: 150 MG/DL

## 2021-09-13 PROCEDURE — 99203 OFFICE O/P NEW LOW 30 MIN: CPT | Performed by: FAMILY MEDICINE

## 2021-09-13 PROCEDURE — 36415 COLL VENOUS BLD VENIPUNCTURE: CPT | Performed by: FAMILY MEDICINE

## 2021-09-13 PROCEDURE — 80061 LIPID PANEL: CPT | Performed by: FAMILY MEDICINE

## 2021-09-13 ASSESSMENT — MIFFLIN-ST. JEOR: SCORE: 1567.48

## 2021-09-13 NOTE — PROGRESS NOTES
SUBJECTIVE:   CC: Melody Babcock is an 31 year old woman who presents for preventive health visit.     {Split Bill scripting  The purpose of this visit is to discuss your medical history and prevent health problems before you are sick. You may be responsible for a co-pay, coinsurance, or deductible if your visit today includes services such as checking on a sore throat, having an x-ray or lab test, or treating and evaluating a new or existing condition :743232}  Patient has been advised of split billing requirements and indicates understanding: Yes  Healthy Habits:     Getting at least 3 servings of Calcium per day:  Yes    Bi-annual eye exam:  Yes    Dental care twice a year:  Yes    Sleep apnea or symptoms of sleep apnea:  None    Diet:  Regular (no restrictions)    Frequency of exercise:  2-3 days/week    Duration of exercise:  30-45 minutes    Taking medications regularly:  Yes    Medication side effects:  Not applicable    PHQ-2 Total Score: 0    Additional concerns today:  No          {additional problems to add (Optional):627899}    Today's PHQ-2 Score:   PHQ-2 ( 1999 Pfizer) 9/10/2021   Q1: Little interest or pleasure in doing things 0   Q2: Feeling down, depressed or hopeless 0   PHQ-2 Score 0   Q1: Little interest or pleasure in doing things Not at all   Q2: Feeling down, depressed or hopeless Not at all   PHQ-2 Score 0       Abuse: Current or Past (Physical, Sexual or Emotional) - No  Do you feel safe in your environment? Yes    Have you ever done Advance Care Planning? (For example, a Health Directive, POLST, or a discussion with a medical provider or your loved ones about your wishes): No, advance care planning information given to patient to review.  {:152749}    Social History     Tobacco Use     Smoking status: Never Smoker     Smokeless tobacco: Never Used   Substance Use Topics     Alcohol use: Not Currently     Alcohol/week: 0.0 standard drinks     Comment: Pregnant, so not drinking     If  "you drink alcohol do you typically have >3 drinks per day or >7 drinks per week? Not applicable    Alcohol Use 9/10/2021   Prescreen: >3 drinks/day or >7 drinks/week? Not Applicable   Prescreen: >3 drinks/day or >7 drinks/week? -   {add AUDIT responses (Optional) (A score of 7 for adult men is an indication of hazardous drinking; a score of 8 or more is an indication of an alcohol use disorder.  A score of 7 or more for adult women is an indication of hazardous drinking or an alchohol use disorder):092129}    Reviewed orders with patient.  Reviewed health maintenance and updated orders accordingly - { :011005::\"Yes\"}  {Chronicprobdata (optional):303917}    Breast Cancer Screening:  Any new diagnosis of family breast, ovarian, or bowel cancer? No    FHS-7: No flowsheet data found.  click delete button to remove this line now  {AMB Mammogram Decision Support (Optional) :772692}  Pertinent mammograms are reviewed under the imaging tab.    History of abnormal Pap smear: { :725480}  PAP / HPV Latest Ref Rng & Units 8/27/2021 2/25/2016 10/31/2012   PAP   Negative for Intraepithelial Lesion or Malignancy (NILM) - -   PAP (Historical) - - NIL NIL   HPV16 Negative Negative - -   HPV18 Negative Negative - -   HRHPV Negative Negative - -     Reviewed and updated as needed this visit by clinical staff  Tobacco  Allergies  Meds              Reviewed and updated as needed this visit by Provider                {HISTORY OPTIONS (Optional):567085}    Review of Systems  {FEMALE ROS (Optional):102814}     OBJECTIVE:   /60 (BP Location: Left arm, Patient Position: Left side, Cuff Size: Adult Large)   Pulse 88   Ht 1.613 m (5' 3.5\")   Wt 88.5 kg (195 lb 3.2 oz)   LMP 06/08/2021 (Exact Date)   SpO2 97%   BMI 34.04 kg/m    Physical Exam  {Exam Choices (Optional):367593}    {Diagnostic Test Results (Optional):516726::\"Diagnostic Test Results:\",\"Labs reviewed in Epic\"}    ASSESSMENT/PLAN:   {Diag Picklist:604741}    Patient " "has been advised of split billing requirements and indicates understanding: {YES / NO:672079::\"Yes\"}  COUNSELING:  {FEMALE COUNSELING MESSAGES:512263::\"Reviewed preventive health counseling, as reflected in patient instructions\"}    Estimated body mass index is 34.04 kg/m  as calculated from the following:    Height as of this encounter: 1.613 m (5' 3.5\").    Weight as of this encounter: 88.5 kg (195 lb 3.2 oz).    {Weight Management Plan (ACO) Complete if BMI is abnormal-  Ages 18-64  BMI >24.9.  Age 65+ with BMI <23 or >30 (Optional):731581}    She reports that she has never smoked. She has never used smokeless tobacco.      Counseling Resources:  ATP IV Guidelines  Pooled Cohorts Equation Calculator  Breast Cancer Risk Calculator  BRCA-Related Cancer Risk Assessment: FHS-7 Tool  FRAX Risk Assessment  ICSI Preventive Guidelines  Dietary Guidelines for Americans, 2010  USDA's MyPlate  ASA Prophylaxis  Lung CA Screening    DUDLEY LEVY  [facility]  "

## 2021-09-13 NOTE — PROGRESS NOTES
Problem List Items Addressed This Visit        Other    BMI 34.0-34.9,adult      Other Visit Diagnoses     Lipid screening    -  Primary    Relevant Orders    Lipid Profile        I checked a screening of lipid today and counseled the patient regarding a healthy approach to nutrition.  We talked about a goal of not gaining more than 15 pounds through the pregnancy.  We discussed a whole food, healthy diet with focus on adequate amounts of protein.  We discussed strategies for emotional eating behaviors.  We talked about the importance of meal planning as well as regular exercise.  I reviewed the patient's past medical history, surgical as well as family history today.  The patient is coming up due for a tetanus shot, however, she will need a pertussis booster midway through her pregnancy as well so I will leave that discussion to her midwife to decide what optimal timing would be.    DUDLEY RIVAS CAM Wolff   Melody is a 31 year old who presents today to establish care.  She states that she has not had a primary care provider for quite some time and that her sister recommended she establish with me.  The patient reports a positive pregnancy test at home and has now established with a nurse midwife with a first OB visit just performed at the end of August.  She is overall feeling well at this point in time although did have some moderate nausea earlier in the pregnancy.  The patient had a Pap smear updated at that time.  The patient does have a history of obesity and states that she finds recently that emotional eating tends to be her biggest challenge.  She is also not exercising on a regular basis.  She has been in a better routine with exercise as well as nutrition in the past.  She is starting to make some changes as she wants to have a healthy pregnancy and would like to talk about nutrition today.  The patient was also suggested by her midwife to get her cholesterol checked.     Objective    /60  "(BP Location: Left arm, Patient Position: Left side, Cuff Size: Adult Large)   Pulse 88   Ht 1.613 m (5' 3.5\")   Wt 88.5 kg (195 lb 3.2 oz)   LMP 06/08/2021 (Exact Date)   SpO2 97%   BMI 34.04 kg/m    Body mass index is 34.04 kg/m .  Physical Exam   GENERAL: healthy, alert and no distress            "

## 2021-09-23 ENCOUNTER — MYC MEDICAL ADVICE (OUTPATIENT)
Dept: MIDWIFE SERVICES | Facility: CLINIC | Age: 31
End: 2021-09-23

## 2021-10-08 ENCOUNTER — PRENATAL OFFICE VISIT (OUTPATIENT)
Dept: MIDWIFE SERVICES | Facility: CLINIC | Age: 31
End: 2021-10-08
Payer: COMMERCIAL

## 2021-10-08 VITALS
WEIGHT: 192.5 LBS | DIASTOLIC BLOOD PRESSURE: 74 MMHG | SYSTOLIC BLOOD PRESSURE: 100 MMHG | BODY MASS INDEX: 32.87 KG/M2 | HEART RATE: 76 BPM | HEIGHT: 64 IN

## 2021-10-08 DIAGNOSIS — Z23 NEED FOR PROPHYLACTIC VACCINATION AND INOCULATION AGAINST INFLUENZA: ICD-10-CM

## 2021-10-08 DIAGNOSIS — Z34.01 ENCOUNTER FOR SUPERVISION OF NORMAL FIRST PREGNANCY IN FIRST TRIMESTER: Primary | ICD-10-CM

## 2021-10-08 PROCEDURE — 99207 PR PRENATAL VISIT: CPT | Performed by: ADVANCED PRACTICE MIDWIFE

## 2021-10-08 PROCEDURE — 90686 IIV4 VACC NO PRSV 0.5 ML IM: CPT | Performed by: ADVANCED PRACTICE MIDWIFE

## 2021-10-08 PROCEDURE — 90471 IMMUNIZATION ADMIN: CPT | Performed by: ADVANCED PRACTICE MIDWIFE

## 2021-10-08 ASSESSMENT — MIFFLIN-ST. JEOR: SCORE: 1555.23

## 2021-10-08 NOTE — PATIENT INSTRUCTIONS
https://www.[employer]/pregnancy-birth/techniques/rest-smart/    [facility]- Contact information:  Appointment line and to get a hold of CNM in clinic Monday-Friday 8 am - 5 pm:  [phone].  There are some clinics with early start times (1st appointment 7:40 am) and others with evening hours (last appointment 6:20 pm).  Most are typically open from 8 am to 5 pm.    CNM on call answering service: [phone].  Specify your hospital of choice and leave a brief message for CNM;  will then page CNM who is on call at your specified hospital and you should receive a call back with 15 minutes.  Be sure that your ringer is audible and that you can accept blocked calls so that we can get back in touch with you! This number should be reserved for urgent needs if during the day, before 8 am, after 5 pm, weekends, holidays.    Contact the on-call CNM with warning signs, such as:    vaginal bleeding     Vaginal discharge and itching or pain and burning during urination    Leg/calf pain or swelling on one side    severe abdominal pain    nausea and vomiting more than 4-5 times a day, or if you are unable to keep anything down    fever more than 100.4 degrees F.         Touring the Maternity Care Center  At this time we are offering a virtual tour of the Maternity Care Centers at both [facility] and [facility]:   [facility]: https://www.[email or web address]/Locations/[facility]/Maternity-Care-Center  [place]:   [email or web address]://[email or web address]/overarching-care/the-birthplace/tours  https://www.[place].org/Locations/[facility]/Maternity-Care-Center/#virtual_tour  When in person tours become available, registrations is required. To schedule a tour at either [place] or [facility], please do so online using the following links:  [facility] - [email or web address]://www.onlineMemory Pharmaceuticalsistrationcenter.com/registerlist.asp?s=[email or web address]&m=303&vs=5&p=2&hiprx=947&ps=1&group=37&it=1&ahd=332    "[place] - https://www.Get 2 It Salesregistrationcenter.com/registerlist.asp?s=6&m=303&vs=5&p=2&kplve=561&ps=1&group=38&it=1&cit=965         Meet the Midwives from [facility]  You are invited to an informational meet and greet with [facility] Certified Nurse-Midwives. Our free \"Meet the Midwives\" event is a great opportunity to learn about our midwives' philosophy and experience, the hospitals where we can assist with your birth, and answer questions you may have. Partners, friends, and family are welcome to attend. Currently, this is a virtual event.  Date  First Tuesday of every month at 7 pm.    Link to next (live) meeting  https://[email or web address]/classes-and-events/meet-the-midwives-from-[facility]  To Join by Telephone (audio only) Call:   [phone] Phone Conference ID: [phone]#        Ultrasound Appointment:   Don't forget to schedule your ultrasound appointment around 20 weeks into your pregnancy. Your midwife will order the exam for you to schedule at [phone] with [facility] radiology locations or at the independent radiology clinic of your preference.      GENETIC SCREENING OPTIONS AT [facility]          All testing is optional. We don t recommend or discourage any test; it is totally up to you and your partner. Some couples wish to know their risk of having a baby with a genetic defect and others do not. We will support your decision. Abnormal results may lead to a discussion of options for further testing.    Accurate dating of your pregnancy is important for all testing so an ultrasound may be done prior to referral or testing.    No testing provides certainty; there are false positives and negatives associated with all testing, some more than others.    Most genetic testing is non-invasive (requires only a blood sample and sometimes an ultrasound or both).    It is always wise to check with your insurance carrier before proceeding.    Some testing can " be done at our lab and some require a referral.    If you decide to do no testing, the 20 week ultrasound scan has some ability to detect abnormalities in the baby.    [place] or [facility]    At 10 wk or greater, a blood sample can be drawn here at clinic or at any of our referral offices. It will provide highly accurate results with low false positive rates for trisomy 18, trisomy 21 (Down Syndrome), and trisomy 13 (> 99% trisomy detection rate at a false positive rate of <0.1%). Gender identification can also be obtained if desired.    Quad Screen (4-marker screen)    Between 15 and 21 weeks, a sample of blood can be drawn at our lab to assess your risk of having a baby with Down Syndrome, Trisomy 18 and neural tube defects. Such testing is able to detect these conditions in 80-90% of cases and the false-positive rate is approximately 5%.     Why is dental care in pregnancy important?  During pregnancy, you are more likely to have problems with your teeth or gums. If you have an infection in your teeth or gums, the chance of your baby being premature (born early) or having low birth weight may be slightly higher than if your teeth and gums are healthy  Dental care is safe during pregnancy and important for the health of you and your baby.   What should you know before you see the dentist?    Make sure your dentist knows that you are pregnant.    If medications for infection or for pain are needed, your dentist can prescribe ones that are safe for you and your baby.    Tell your dentist about any changes you have noticed since you became pregnant and about any medications r or supplements you are taking.    Routine x-rays should be avoided in pregnancy, but it may be necessary if there is a problem or an emergency.     Your body should be covered with a lead apron to protect you and your baby.    Dental work can be done safely at any point in pregnancy. If possible, it is best to delay treatments and pro- cedures  until after the first trimester.    For more information on dental health in pregnancy: http://onlinelibrary.brody.com/store/10.1111/jmwh.80656/asset/ycni86804.pdf?v=1&t=mdai0g11&q=[email or web address]     Quickening:   Your Baby in the Second Trimester of Pregnancy  ? At the start of the second trimester, you will feel your baby's movements, which get stronger as the baby grows bigger. At the end of the fourth month, your baby weighs about five ounces. Her kidneys begin to produce urine. During visits to your health care provider, you will be able to hear your baby's heartbeat more clearly. Your baby can move and hear your voice.   ? By the end of the fifth month, you'll be able to feel light movements (called quickening) of your fetus. Your baby is sleeping and waking at regular intervals, and is more active than before. At this point, she is about nine inches long and weighs about one-half to one pound. During the sixth month, your baby's features become clearer. Eyebrows, eyelashes, and hair are developing. She also has finger and toe prints, and may be kicking strongly.  ? By the end of the second trimester, your baby weighs as much as two pounds and is about 11 inches long.         Gestational diabetes  Gestational diabetes develops during pregnancy (gestation). Like other types of diabetes, gestational diabetes affects how your cells use sugar (glucose). Gestational diabetes causes high blood sugar that can affect your pregnancy and your baby's health.  Any pregnancy complication is concerning, but there's good news. Expectant moms can help control gestational diabetes by eating healthy foods, exercising and, if necessary, taking medication. Controlling blood sugar can prevent a difficult birth and keep you and your baby healthy.  In gestational diabetes, blood sugar usually returns to normal soon after delivery. But if you've had gestational diabetes, you're at risk for type 2 diabetes.  You'll continue working with your health care team to monitor and manage your blood sugar.    Who is at risk?  This is a list of factors that increase the risk of developing gestational diabetes for women during pregnancy:        Overweight prior to pregnancy (20% or more over ideal body weight)        High risk ethnic group: , , ,         Impaired glucose tolerance or traces of glucose in the urine        Family history of diabetes        Previously giving birth to a baby over 9 lbs. or stillborn        Previous pregnancy with gestational diabetes    Prevention:  There are no guarantees when it comes to preventing gestational diabetes -- but the more healthy habits you can adopt before pregnancy, the better. If you've had gestational diabetes, these healthy choices may also reduce your risk of having it in future pregnancies or developing type 2 diabetes down the road.    Eat healthy foods. Choose foods high in fiber and low in fat and calories. Focus on fruits, vegetables and whole grains. Strive for variety to help you achieve your goals without compromising taste or nutrition. Watch portion sizes.     Keep active. Exercising before and during pregnancy can help protect you from developing gestational diabetes. Aim for 30 minutes of moderate activity on most days of the week. Take a brisk daily walk. Ride your bike. Swim laps.  If you can't fit a single 30-minute workout into your day, several shorter sessions can do just as much good. Park in the distant lot when you run errands. Get off the bus one stop before you reach your destination. Every step you take increases your chances of staying healthy.    Lose excess pounds before pregnancy. Doctors don't recommend weight loss during pregnancy. But if you're planning to get pregnant, losing extra weight beforehand may help you have a healthier pregnancy.  Focus on permanent changes to your eating habits. Motivate  yourself by remembering the long-term benefits of losing weight, such as a healthier heart, more energy and improved self-esteem.    Preventing Diabetes after Pregnancy:  It is estimated that 35-60 percent of women that have had gestational diabetes will develop type 2 diabetes in the future. It is also thought that children from these pregnancies have a greater chance of developing obesity and type 2 diabetes.    If you do have prediabetes or have risk factors for having diabetes, research shows that doing just two things can help you prevent or delay type 2 diabetes: Lose 5% to 7% of your body weight, which would be 10 to 14 pounds for a 200-pound person; and get at least 150 minutes each week of physical activity, such as brisk walking.    RESOURCES   You can refer to the Starting Out Right book or find it online at http://www.healthCurrent Media.org/images/stories/maternity/HealthAlliedPath-Starting-Out-Right.pdf p  You can sign up for a weekly parenting e-mail that gives support, tips and advice from health care professionals that starts with pregnancy and continues through the toddler years. To register, go to www.healthCurrent Media.org/baby at any time during your pregnancy.    Breastfeeding Information:  OUTPATIENT LACTATION RESOURCES    -Schedule a clinic appointment with a e[facility] [place] [facility] with dedicated clinic hours for breastfeeding assistance by calling [phone]. Breastfeeding clinic visits are at [facility] on Wednesdays, [facility] on Tuesdays and [place] [facility] on Thursdays.     -Baby Café    Pregnant and interested in breastfeeding?  Need answers to breastfeeding questions?  Want to help breastfeeding moms?  Already breastfeeding and want to meet other moms?    Join us at the Baby Café!    Baby Cafe is a free, drop-in service offering breast-feeding support for pregnant women, breast-feeding mothers and their families.  Come share tips and socialize with other mothers.   Babies and siblings are welcome (no childcare available).    Starting April 2018, Baby Café will be at 4 locations.  Please see below for the Baby Café closest to you!      [facility]  [address] [place] [address]  1st Wednesday: 10am-12pm    [facility]  [address] [place] [address]  3rd Wednesday 4-6pm    [facility]  [address] [place] [address]  4th Wednesday 10am-12:30pm    [facility]  [address] [place] [address]  4th Wednesdays: 4-6pm      Hmong, Uzbek, and Australian which is may be available at some sites.    For more information, please contact: Na [email or web address]. or [phone]    -Attend a baby weigh in at [facility].  Lactation consultants are available to answer questions  [facility]: Tuesdays 1:00 - 2:00  [facility]: Mondays 1:00 - 2:00   www.[email or web address]    -Attend one of the New Mama groups at [facility] in [facility] [place].  [facility] also offers one-on-one in home and in office lactation consults.   www.[facility].CarRentalsMarket    -Attend a LeLeche League meeting.  Multiple groups in several locations throughout the [facility]. The meetings are no-cost and always informative breastfeeding education session through [employer] La Leche League  Www.lllondas.org/  Medication use while breastfeeding: http://toxnet.nlm.nih.gov/newtoxnet/lactmed.htm     Childbirth and Parenting Education:     Everyday Miracles:   https://www.everyday-miracles.org/    Free Video Series from [facility]: https://nursing.[facility]/academics/specialty-areas/nurse-midwifery/having-baby-prenatal-videos/having-baby-prenatal-and    [facility] center: http://Motive Power system.CarRentalsMarket/   [phone]  Bloomodalys: (education, yoga & wellness) www.Meludia  [facility]: www.Piikuma.com   Childbirth collective: (Parent topic nights)   www.childbirthcollective.org/  Hypnobabies:  www.hypnobabiestwincities.com/  Hypnobirthing:  Http://hypnobirthing.com/  The Birth Hour: https://Impulsonic/online-childbirth-class/    APPS and Podcasts:   Jossy Moss Nurture    Evidence Based Birth  The Birth Hour (for birth stories)   Birthful   Expectful   The Longest Shortest Time  PregnancyPodcast Yarely Nassar    Book Recommendations:   Elizabeth Hellen's Birthing From Within--first few chapters include a new-age tone, you may prefer to skip it and keep going, because there is good stuff later.  This book recommendation covers emotional preparation, but does cover coping with pain, and use of both pharmacological and nonpharmacological methods.    Dr. Hogan' The Pregnancy Book and The Birth Book--the pregnancy book goes month-by month    The Birth Partner by Loren Genao    Womanly Art of Breastfeeding by La Leche League International   Bestfeeding by Elisa Lu--great pictures    Mothering Your Nursing Toddler, by Mabel Talamantes.   Addresses dealing with so many of the challenging behaviors of a nursing toddler.  How Weaning Happens, by La Leche League.  Discusses weaning at all ages, from medically necessary weaning of an infant, all the way up to age 5 (or older), with why/why not, and strategies.  Very empowering book both for deciding to wean and deciding not to.    American College of Nurse-Midwives (ACNM) http://www.midwife.org/; look at the informational handouts at http://www.midwife.org/Share-With-Women     www.mymidwife.org    Mother to Baby (Medication and Herbal guidance in pregnancy): http://www.mothertobaby.org  Toll-Free Hotline: 844.601.7366  LactMed (Medication use while breastfeeding): http://toxnet.nlm.nih.gov/newtoxnet/lactmed.htm    Women's Health.gov:  http://www.womenshealth.gov/a-z-topics/index.html    American pregnancy association - http://americanpregnancy.org    Centering Pregnancy (group prenatal care option):  "http://centeringhealthcare.org    Information about doulas:  Childbirth collective: http://www.childbirthcollective.org/  Doulas of [place] (CHUCK):  www.chuck.org  [facility]  project: http://SocioSquarecitiesdoulaproject.com/     Early Childhood and Family Education (ECFE):  ECFE offers parents hands-on learning experiences that will nourish a lifetime of teachable moments.  http://ecfe.info/ecfe-home/    March of Dimes www.Aria Networks     FDA - Nutrition  www.mypyramid.gov  Under \"For Consumers,\" click on \"pregnant and breastfeeding women.\"      Centers for Disease Control and Prevention (CDC) - Vaccines : http://www.cdc.gov/vaccines/       When researching information on the web, question the validity of websites.  The domains .gov, .edu and.org tend to be more reliable information.  If there are a lot of advertisements, be cautious of the information provided. Stay away from blogs and chat rooms please!  "

## 2021-10-08 NOTE — PROGRESS NOTES
Here today with Timoteo. Feeling well and offers no concerns. Notes quickening. Reviewed options for genetic screening and declines today. Reviewed IOB labs, reviewed low end of normal hgb and recommendations for supplementation throughout pregnancy. Provided written materials on Fe supplement options. Reviewed recommendation for flu vaccination in pregnancy, accepts today. Advised on timing of mid-pregnancy ultrasound; ordered for [facility] per pt preference. Reviewed normal changes in mid pregnancy, comfort measures. Encouraged to look into CBE options and provided resources. Reviewed warning s/sx and reasons to call. RTC 4 weeks.

## 2021-10-22 ENCOUNTER — HOSPITAL ENCOUNTER (OUTPATIENT)
Dept: ULTRASOUND IMAGING | Facility: CLINIC | Age: 31
Discharge: HOME | End: 2021-10-22
Attending: ADVANCED PRACTICE MIDWIFE | Admitting: ADVANCED PRACTICE MIDWIFE
Payer: COMMERCIAL

## 2021-10-22 ENCOUNTER — DOCUMENTATION ONLY (OUTPATIENT)
Dept: MIDWIFE SERVICES | Facility: CLINIC | Age: 31
End: 2021-10-22

## 2021-10-22 DIAGNOSIS — Z34.01 ENCOUNTER FOR SUPERVISION OF NORMAL FIRST PREGNANCY IN FIRST TRIMESTER: ICD-10-CM

## 2021-10-22 PROCEDURE — 76805 OB US >/= 14 WKS SNGL FETUS: CPT

## 2021-11-12 ENCOUNTER — PRENATAL OFFICE VISIT (OUTPATIENT)
Dept: MIDWIFE SERVICES | Facility: CLINIC | Age: 31
End: 2021-11-12
Payer: COMMERCIAL

## 2021-11-12 VITALS
SYSTOLIC BLOOD PRESSURE: 110 MMHG | WEIGHT: 189.5 LBS | HEART RATE: 64 BPM | BODY MASS INDEX: 32.35 KG/M2 | HEIGHT: 64 IN | DIASTOLIC BLOOD PRESSURE: 80 MMHG

## 2021-11-12 DIAGNOSIS — Z34.02 ENCOUNTER FOR SUPERVISION OF NORMAL FIRST PREGNANCY IN SECOND TRIMESTER: Primary | ICD-10-CM

## 2021-11-12 PROCEDURE — 99207 PR PRENATAL VISIT: CPT | Performed by: ADVANCED PRACTICE MIDWIFE

## 2021-11-12 RX ORDER — PNV NO.95/FERROUS FUM/FOLIC AC 28MG-0.8MG
1 TABLET ORAL DAILY
Status: ON HOLD | COMMUNITY
Start: 2021-10-08 | End: 2022-03-18

## 2021-11-12 ASSESSMENT — MIFFLIN-ST. JEOR: SCORE: 1541.63

## 2021-11-12 NOTE — PROGRESS NOTES
Here today with Timoteo for routine prenatal visit at 23w4d. Reports feeling well and offers no concerns today. Notes daily FM. Having some pregnancy discomforts but generally well. Reviewed mid pregnancy US wnl, growth 17%ile. Reviewed current weight loss since start of pregnancy, diet recall demonstrates regular consistent breakfast with protein source, difficulty with consistent lunch options including protein shake x 1 per week and avacado toast and cottage cheese or nuts/berries or apples or string cheese or HB egg options, and dinner is consistent with meat choice, veg, potato. Encouraged efforts for consistent meals and snacks throughout the day. Advised on upcoming labs to anticipate at [place]. Advised on recommendation for tdap vaccine in pregnancy. Reviewed warning s/sx and reasons to call. RTC 5 weeks.

## 2021-11-12 NOTE — PATIENT INSTRUCTIONS
"[facility] Contact information:  Appointment line and to get a hold of CNM in clinic Monday-Friday 8 am - 5 pm:  [phone].  There are some clinics with early start times (1st appointment 7:40 am) and others with evening hours (last appointment 6:20 pm).  Most are typically open from 8 am to 5 pm.    CNM on call answering service: [phone].  Specify your hospital of choice and leave a brief message for CNM;  will then page CNM who is on call at your specified hospital and you should receive a call back with 15 minutes.  Be sure that your ringer is audible and that you can accept blocked calls so that we can get back in touch with you! This number should be reserved for urgent needs if during the day, before 8 am, after 5 pm, weekends, holidays.    Contact the on-call CNM with warning signs, such as:    vaginal bleeding     Vaginal discharge and itching or pain and burning during urination    Leg/calf pain or swelling on one side    severe abdominal pain    nausea and vomiting more than 4-5 times a day, or if you are unable to keep anything down    fever more than 100.4 degrees F.     Meet the Midwives from [facility]  You are invited to an informational meet and greet with [facility] Certified Nurse-Midwives. Our free \"Meet the Midwives\" event is a great opportunity to learn about our midwives' philosophy and experience, the hospitals where we can assist with your birth, and answer questions you may have. Partners, friends, and family are welcome to attend. Currently, this is a virtual event.  Date  First Tuesday of every month at 7 pm.    Link to next (live) meeting  https://[email or web address].org/classes-and-events/meet-the-midwives-from-[facility]  To Join by Telephone (audio only) Call:   [phone] Phone Conference ID: [id number]      UNDERSTANDING  LABOR    Going into labor before your 37th week of " pregnancy is called  labor.  labor can cause your baby to be born too soon. This can lead to a number of health problems that may affect your baby. From 28-35 weeks, Patients are advised [facility] since they have a  Intensive Care Unit (NICU).  -Before labor, the cervix is thick and closed.  -In  labor, the cervix begins to efface (thin) and dilate (open) over a short period of time    Symptoms of  Labor  If you believe you re having  labor, contact the midwives right away. But contractions alone don t mean you re in  labor. What matters more are changes in your cervix (the lower end of the uterus). Symptoms of  labor include:    five or more contractions per hour    Strong & frequent contractions    Constant menstrual-like cramping    Low-back pain    Mucous or bloody vaginal discharge    Bleeding or spotting in the second or third trimester    Evaluating  Labor  Your midwife will try to find out whether you re in  labor or whether you re just having contractions.She may watch you for a few hours. The following tests may be done:    Pelvic exam to see if your cervix has effaced (thinned) and dilated (opened)    Uterine activity monitoring to detect contractions    Fetal monitoring to check the health of your baby    Ultrasound to check your baby s size and position    Caring for Yourself At Home  If you have contractions  but your cervix is still thick and closed, the midwife may ask you to do the following at home:    Drink plenty of water.    Do fewer activities.    Rest in bed on your side.    Avoid intercourse and nipple stimulation.    When to Call Your Midwife    Five or more contractions per hour    Bag of water breaks    Bleeding or spotting     If You Need Hospital Care   labor often requires that you have hospital care and complete bed rest. You may have an IV (intravenous) line  to get fluids. And you may be given pills or an injection to help prevent contractions. Finally, you may receive medication (corticosteroids) that helps your baby s lungs mature more quickly.    Are You At Risk?  Any pregnant woman can have  labor. It may start for no reason. But these risk factors can increase your chances:    Past  labor or past early birth    Smoking and drug or alcohol use during pregnancy    Multiple fetuses (twins or more)    Problems with the shape of the uterus    Bleeding during the pregnancy    The Dangers of  Birth  A baby born too soon may have health problems. This is because the baby didn t have enough time to mature. The baby then is at risk of:    Not breastfeeding well    Having immature lungs    Bleeding in the brain    Dying    Reaching Term  Your goal is to get as close to term as you can before giving birth. The closer you get to term, the higher your chance of having a healthy baby. Work with your healthcare provider. Together, you can take steps that may keep you from giving birth too early.        Testing for Gestational Diabetes in Pregnancy  [facility] are committed to providing safe care during your pregnancy. We follow the recommendations of the American Diabetes Association and the American College of Obstetricians and Gynecologists to test all pregnant women for gestational diabetes. Testing early in pregnancy (if you have risk factors) and testing all women between 26-28 weeks follows local and national guidelines for care during pregnancy. Clients who feel that they cannot consent to such testing, may choose to transfer their care to our consultant obstetricians.  What is the test?  Eat normally on the day of the test; a diet rich in protein, whole grains, and vegetables would be best. Avoid simple sugars, white flour products and juices prior to testing.  You will be asked to drink a 10 oz glucose beverage (50 gm, about the same as  a can of root beer).  The product is not carbonated as it has to be consumed within 5 minutes. During the next hour, you are seen for a prenatal visit, but are asked to limit your activity around the clinic. Feel free to bring a book or your computer.   Any level less than 140 for this  glucose challenge test  is considered normal. If measured at 140 to 185, the diagnostic test, a  3 hour glucose tolerance test  will be conducted. If 2 or more readings are abnormal, the diagnosis of gestational diabetes is confirmed and a referral to a diabetes educator will be made. If the level is 185 or above, this confirms the diagnosis and a referral will be made without administering the 3 hour test.  A fasting blood sugar may be performed sometime in the first trimester if you have risk factors for the development of gestational diabetes such as a previous diagnosis or birth of a large baby or a close family relative with diabetes.  What is gestational diabetes?  Your body converts what you eat into glucose. In response to rising blood sugar levels it secretes insulin in order to be able to utilize that glucose. During pregnancy as the placenta grows and matures, it secretes hormones that are necessary to assure baby s growth and development, however, they also reduce the action of insulin in the mother. In some cases too much insulin is blocked (this is called  insulin resistance ) and blood sugar in the mother rises above a normal level. Pregnant women who have never had diabetes before but who have high blood sugar (glucose) levels during pregnancy are said to have gestational diabetes. Gestational diabetes affects about 4-7% of all pregnancies.  What if I have gestational diabetes?  If either of the tests for gestational diabetes show that you have this condition, a referral will be made to visit with the diabetes educator. The educator will help you to make wise food choices, count carbohydrates, monitor your blood sugar  and record your levels in a journal. We ask that you bring this diary with you at each prenatal visit. You may meet with the educator several times during the remainder of your pregnancy.  How gestational diabetes can affect your baby  Some mothers may wonder why testing for GDM is delayed until the early part of the 3rd trimester for most women. Gestational diabetes has an impact on the baby at the time of rapid body growth. When the mother s blood has elevated sugar levels, extra glucose crosses the placenta causing the baby to have excess weight gain ( macrosomia ). Some babies are too big to be born vaginally so their mother must have  births. Babies of mothers with uncontrolled gestational diabetes may have difficult births that can cause trauma to the mother and in rare circumstances, babies may suffer fractures or oxygen deprivation during birth. They may have difficulty maintaining their own blood sugar after birth and they may also have trouble adapting to life outside. Recent research indicates that babies of mothers with uncontrolled or undiagnosed gestational diabetes are at risk for obesity and type 2 diabetes. Women who develop gestational diabetes are more likely to develop type 2 diabetes within 15 years after their pregnancy.  Additional Information  The American College of Nurse Midwives (ACNM) provides an information sheet describing diabetes screening in pregnancy: http://www.womensdocs.com/nataliya/pdf/Second_Trimester/Gestational_Diabetes.pdf    You can visit the American Diabetes Association website http://www.diabetes.org for additional information and to purchase their book,  Gestational Diabetes: What to Expect .    A brochure from the American College of Obstetrics and Gynecology is available at:   http://www.acog.org/~/media/For%20Patients/yic577.pdf?dmc=1&uu=[email or web address]  Testing for gestational diabetes is a critical part of your prenatal journey. Thank you for taking good  care of yourself and your baby.    HEALTHY PREGNANCY CARE: 22-26 WEEKS PREGNANT    You are finishing your second trimester. Your baby is developing rapidly. At this stage, babies have a sense of balance, can respond to touch, and are recognizing parent voices.  Your baby will be moving around more now.  You may notice [place]-V[place]yra contractions now, which are painless and prepare the uterus for the delivery.    Nutrition: During this time, you may find that your nausea and fatigue are gone. Overall, you may feel better and have more energy than you did in your first trimester. Be sure you are getting enough calcium and iron in your diet. Your prenatal vitamins cannot supply all of the nutrients you need, so continue to eat 3-4 servings of dairy foods and 2-3 servings of meat/fish/poultry/nuts every day. Foods high in iron include: red meats, eggs, dark green vegetables, dark yellow vegetables, nuts, kidney beans and chickpeas. Some cereals are fortified with iron, so look at the food labels for 100% of the daily requirement for iron.     Discuss your work situation with your midwife or physician as needed. If you stand for long periods of time, you may need to make changes and take breaks.    [place] for childbirth and parenting classes, including an infant CPR class. Breastfeeding classes are recommended too.    Childbirth and Parenting Education:       Everyday Miracles:   https://www.everyday-miracles.org/    Free Video Series from [facility]: https://nursing.[facility]/academics/specialty-areas/nurse-midwifery/having-baby-prenatal-videos/having-baby-prenatal-and    MARINA parenting [place]: http://[facility] HospitalPromuc.Accipiter Systems/   (007) 695-BABY  [place]: (education, yoga & wellness) www.Blue Dot Worlda.Accipiter Systems  Enlightened Mama: www.enlightenedmama.com   Childbirth collective: (Parent topic nights)  www.childbirthcollective.org/  Hypnobabies:  www.hypnobabiestwincities.com/  Hypnobirthing:  Http://hypnobirthing.com/  The  Birth Hour: https://Clandestine Development/online-childbirth-class/    APPS and Podcasts:   Amintanaila Moss Nina    Evidence Based Birth  The Birth Hour (for birth stories)   Birthful   Expectful   The Longest Shortest Time  PregnancyPodcast Yarely Nassar    Book Recommendations:   Elizabeth Solway's Birthing From Within--first few chapters include a new-age tone, you may prefer to skip it and keep going, because there is good stuff later.  This book recommendation covers emotional preparation, but does cover coping with pain, and use of both pharmacological and nonpharmacological methods.    Dr. Hogan' The Pregnancy Book and The Birth Book--the pregnancy book goes month-by month      The Birth Partner by Loren Genao    Womanly Art of Breastfeeding by La Leche League International   Bestfeeding by Elisa Lu--great pictures    Mothering Your Nursing Toddler, by Mabel Talamantes.   Addresses dealing with so many of the challenging behaviors of a nursing toddler.  How Weaning Happens, by La Leche League.  Discusses weaning at all ages, from medically necessary weaning of an infant, all the way up to age 5 (or older), with why/why not, and strategies.  Very empowering book both for deciding to wean and deciding not to.    American College of Nurse-Midwives (ACNM) http://www.midwife.org/; look at the informational handouts at http://www.midwife.org/Share-With-Women     www.mymidwife.org    Mother to Baby (Medication and Herbal guidance in pregnancy): http://www.mothertobaby.org  Toll-Free Hotline: 165.153.7719  LactMed (Medication use while breastfeeding): http://toxnet.nlm.nih.gov/newtoxnet/lactmed.htm    Women's Health.gov:  http://www.womenshealth.gov/a-z-topics/index.html    American pregnancy association - http://americanpregnancy.org    Centering Pregnancy (group prenatal care option): http://centeringhealthcare.org    Information about doulas:  Childbirth collective: http://www.childbirthcollective.org/  Doulas of  "[place] (CHUCK):  www.chuck.org  [facility]  project: http://twincitiesdoulaproject.com/     Early Childhood and Family Education (ECFE):  ECFE offers parents hands-on learning experiences that will nourish a lifetime of teachable moments.  http://ecfe.info/ecfe-home/    March of Dimes www.Better Walk     FDA - Nutrition  www.mypyramid.gov  Under \"For Consumers,\" click on \"pregnant and breastfeeding women.\"      Centers for Disease Control and Prevention (CDC) - Vaccines : http://www.cdc.gov/vaccines/       When researching information on the web, question the validity of websites.  The [place] .gov, "[employer] andBuscapÃ©org tend to be more reliable information.  If there are a lot of advertisements, be cautious of the information provided. Stay away from blogs and chat rooms please!    How can you care for yourself at home?   You can refer to the Starting Out Right book or find it online at http://www.[facility].org/images/stories/maternity/[facility]-Starting-Out-Right.pdf or http://www.[facility]altheast.org/images/stories/flipbooks/[facility]-starting-out-right/[facility]-starting-out-right.html#p=8     You can sign up for a weekly parenting e-mail that gives support, tips and advice from health care professionals that starts with pregnancy and continues through the toddler years. To register, go to www.[facility]altheast.org/baby at any time during your pregnancy.      Baby Feeding in the Hospital: Information, Support and Resources    As you prepare for the birth of your child, you will want to consider options for feeding your baby including breast-feeding and/or baby formula. The American Academy of Pediatrics recommends exclusive breast-feeding for the first six months (although any amount of breast-feeding is beneficial).  However, we also understand that breast-feeding is a personal choice and not for everyone. Whether or not you choose to breast-feed, your decision will be respected by our staff.    There are " numerous benefits of breast-feeding; here are a few to consider:    Provides antibodies to protect your baby from infections and diseases    The cost: formula can cost over $1,500 per year    Convenience, no warming up or sterilizing bottles and supplies    The physical contact with breastfeeding can make babies feel secure, warm and comforted    What ever my feeding choice, what can I expect after I deliver my baby?    Your baby will usually be placed skin-to-skin immediately following birth. The skin to skin contact between you and your baby will be a special and memorable time. The bonding and attachment comforts your baby and has a positive effect on baby s brain development.     Having your baby  room in  with you also helps you start to learn your baby s body rhythms and sleep cycle.      You will also begin to learn your baby s cues (signals) that he or she is ready to feed.    When do I start to feed my baby?  As soon as possible after your baby s birth, you will be encouraged to begin feeding.  In the first couple of weeks, your baby will eat often.  Breastfeeding babies usually eat at least 8 times in 24 hours.  Babies fed formula usually eat at least 7 times in 24 hours.      Breast-feeding tips:    Get comfortable and use pillows for support.    Have your baby at the level of your breast, facing you,  tummy to tummy .      Touch your nipple to your baby s lips so you baby s mouth opens wide (rooting reflex).  Aim the nipple toward the roof of your baby s mouth. When your baby opens his or her mouth, pull your baby toward your breast to help your baby  latch on  to your nipple and much of the areola area.    Hand expressing your breast milk can assist with latching your baby to your breast, if needed.    Ask for help, breastfeeding may seem awkward or uncomfortable at first, this is normal. There are numerous resources available at [facility], Clinics and beyond.     If your goal is to  exclusively breastfeed, avoid using any formula or artificial nipples (including bottles and pacifiers) while you are your baby are learning to breastfeed unless there is a medical reason.       Mixing breastfeeding and formula can interfere with how you begin building your milk supply.  It can impact how you and your baby  learn  to breastfeeding together and alter the natural growth of  good  bacteria in your baby s stomach.    Delay a pacifier or a bottle in the first few weeks until breastfeeding is well established. This is often around 3 weeks of age.    Ask your nurse to show you how to hand express.   Breast milk can be kept in the refrigerator or freezer for later use.        Touring the Maternity Care Center  At this time we are offering a virtual tour of the Maternity Care Centers at both [facility]:   [facility]: https://www.[place]/Locations/[facility]/Maternity-Care-Center  [place]:   https://[place]/overarching-[facility]/the-birthplace/tours  https://www.[place]/[facility]/Maternity-Care-Center/#virtual_tour  When in person tours become available, registrations is required. To schedule a tour at either [place] or [facility], please do so online using the following links:  [facility] - https://www.Recurrent Energy.Incredible Labs/registerlist.asp?s=6&m=303&vs=5&p=2&vpiol=036&ps=1&group=37&it=1&sgb=324  [facility] - https://www.Recurrent Energy.In[employer]ible Labs/registerlist.asp?s=6&m=303&vs=5&p=2&qikcm=293&ps=1&group=38&it=1&cky=772    Hospital and Clinic  Resources:  -Schedule an appointment with a [facility] [place] [facility]   BRITTON who is also a Lactation Consultant by calling [phone]     -Schedule a clinic appointment with a ealth [place] [facility] BRITTON with dedicated clinic hours for breastfeeding assistance by calling [phone]. Breastfeeding clinic visits are at [facility] on Mondays,  [facility] on Tuesdays and [facility] on Thursdays.     Baby Café    Pregnant and interested in breastfeeding?  Need answers to breastfeeding questions?  Want to help breastfeeding moms?  Already breastfeeding and want to meet other moms?    Join us at the Baby Café!    Baby Cafe is a free, drop-in service offering breast-feeding support for pregnant women, breast-feeding mothers and their families.  Come share tips and socialize with other mothers.  Babies and siblings are welcome (no childcare available).    Starting April 2018, Baby Café will be at 4 locations.  Please see below for the Baby Café closest to you!      [facility]  [address] [place] [address]  1st Wednesday: 10am-12pm    [facility]  [address] [place] [address]  3rd Wednesday 4-6pm    [facility]  [address] [place] [address]  4th Wednesday 10am-12:30pm    [employer] [facility]  [address] [place] [address]  4th Wednesdays: 4-6pm      Hmong, Samoan, and Djiboutian which is may be available at some sites.    For more information, please contact: [email or web address] or [phone]    -Attend a baby weigh in at [facility].  Lactation consultants are available to answer questions  [place]: Tuesdays 1:00 - 2:00  [facility]: Mondays 1:00 - 2:00  www.[facility] ParkDNA13.BookTour    -Attend one of the New Mama groups at [facility] in [facility].  [facility] also offers one-on-one in home and in office lactation consults.   www.[facility].com    -Attend a OliviaLecjoan League meeting.  Multiple groups in several locations throughout the [facility]. The meetings are no-cost and always informative breastfeeding education session through Internatal La Leche League  Www.chad.org/  Medication use while breastfeeding: http://toxnet.nlm.nih.gov/newtoxnet/lactmed.htm     Online Resources:    [facility].org/baby sign up for  "free online weekly e-mail    [facility].org/maternity    Breastfeedingmadesimple.com    BTC Trip.DoApp (La Leche Lemargret)    Normalfed.com    Womenshealth.gov/breastfeeding    Workandpump.com    Breast-feeding Supplies & Pumps:  Talk to your insurance provider or WIC (Women, Infants and Children) to learn more about options available to you. Recent health insurance changes may include additional coverage for supplies and pumps.    Public Health:  Women, Infants and Children Nutrition program (WIC): provides breast-feeding support and education in addition to formal feeding moms. 852-TPX-8582 or http://www.health.[facility].us/divs/fh/wic    Family Health Home Visiting: [facility] Nurse home visits are available. Talk to your provider to see if you qualify. Most [facility] have a program available.    Additional Resources:  La Leche Lemargret is an international, nonprofit, nonsectarian organization offering information, education, and support to mothers who want to breast-feed their babies. Local groups offer phone help and monthly meetings. Visit Baboo or [employer] and us the  Find local support  drop down menu or click on the  Resources  tab.    [place] Breastfeeding Resources: 4-951-498-BABY (2220) toll free    National Breastfeeding Help Line trained breastfeeding peer counselors can help answer common breast-feeding questions by phone. Monday-Friday: English/Japanese  3-598- 992-9634 toll free, 1-795.428.4869 (TTY)    [facility] Connection: 651-326-CARE (7931)      Virtual Breastfeeding Support:    During this time of isolation, breastfeeding families need even more community!  Here are some area organizations offering virtual support groups for breastfeeding:      [facility] Cafe Support Group, Tuesdays at 10:30 am   Run by TABITHA Moreau of [facility]act[facility]   Go to [facility] Facebook page and click on \"events\" for " link   https://www.[email or web address].com/[email or web address]/    [facility] Milk Hour,  at 2:30 pm    Run by TABITHA Tam   Go to [facility] FB page and send message to get link   [email or web address]    [facility] holding virtual meetings the first Tuesday of each month, 8-9 pm, and the   Third Saturday, 10 - 11 am.  Go to [facility] [place] FB page; message to get link [email or web address]    [facility] offers a Lactation Lounge every Friday 12pm - 1pm, run by Adria Gunter Leader   Sign up via link at [email or web address]   [email or web address]    [facility] is offering virtual support groups every Monday, 10:30 am - 12 pm, run by nurse IBCLC   [email or web address]    Prenatal Breastfeeding Classes:        [facility] is offering virtual breastfeeding and  care classes:  [email or web address]    [employer] childbirth and breastfeeding education offering virtual prenatal breastfeeding classes  [email or web address]

## 2021-11-15 ENCOUNTER — MYC MEDICAL ADVICE (OUTPATIENT)
Dept: MIDWIFE SERVICES | Facility: CLINIC | Age: 31
End: 2021-11-15
Payer: COMMERCIAL

## 2021-12-17 ENCOUNTER — PRENATAL OFFICE VISIT (OUTPATIENT)
Dept: MIDWIFE SERVICES | Facility: CLINIC | Age: 31
End: 2021-12-17
Payer: COMMERCIAL

## 2021-12-17 VITALS
BODY MASS INDEX: 33.83 KG/M2 | HEART RATE: 102 BPM | WEIGHT: 194 LBS | DIASTOLIC BLOOD PRESSURE: 68 MMHG | SYSTOLIC BLOOD PRESSURE: 126 MMHG | OXYGEN SATURATION: 98 %

## 2021-12-17 DIAGNOSIS — Z34.02 ENCOUNTER FOR SUPERVISION OF NORMAL FIRST PREGNANCY IN SECOND TRIMESTER: Primary | ICD-10-CM

## 2021-12-17 LAB
GLUCOSE 1H P 50 G GLC PO SERPL-MCNC: 84 MG/DL (ref 70–129)
HGB BLD-MCNC: 11.6 G/DL (ref 11.7–15.7)

## 2021-12-17 PROCEDURE — 90471 IMMUNIZATION ADMIN: CPT | Performed by: ADVANCED PRACTICE MIDWIFE

## 2021-12-17 PROCEDURE — 90715 TDAP VACCINE 7 YRS/> IM: CPT | Performed by: ADVANCED PRACTICE MIDWIFE

## 2021-12-17 PROCEDURE — 36415 COLL VENOUS BLD VENIPUNCTURE: CPT | Performed by: ADVANCED PRACTICE MIDWIFE

## 2021-12-17 PROCEDURE — 82950 GLUCOSE TEST: CPT | Performed by: ADVANCED PRACTICE MIDWIFE

## 2021-12-17 PROCEDURE — 99207 PR PRENATAL VISIT: CPT | Performed by: ADVANCED PRACTICE MIDWIFE

## 2021-12-17 PROCEDURE — 86780 TREPONEMA PALLIDUM: CPT | Performed by: ADVANCED PRACTICE MIDWIFE

## 2021-12-17 PROCEDURE — 85018 HEMOGLOBIN: CPT | Performed by: ADVANCED PRACTICE MIDWIFE

## 2021-12-17 NOTE — NURSING NOTE
Prior to immunization administration, verified patients identity using patient s name and date of birth. Please see Immunization Activity for additional information.     Screening Questionnaire for Adult Immunization    Are you sick today?   No   Do you have allergies to medications, food, a vaccine component or latex?   No   Have you ever had a serious reaction after receiving a vaccination?   No   Do you have a long-term health problem with heart, lung, kidney, or metabolic disease (e.g., diabetes), asthma, a blood disorder, no spleen, complement component deficiency, a cochlear implant, or a spinal fluid leak?  Are you on long-term aspirin therapy?   No   Do you have cancer, leukemia, HIV/AIDS, or any other immune system problem?   No   Do you have a parent, brother, or sister with an immune system problem?   No   In the past 3 months, have you taken medications that affect  your immune system, such as prednisone, other steroids, or anticancer drugs; drugs for the treatment of rheumatoid arthritis, Crohn s disease, or psoriasis; or have you had radiation treatments?   No   Have you had a seizure, or a brain or other nervous system problem?   No   During the past year, have you received a transfusion of blood or blood    products, or been given immune (gamma) globulin or antiviral drug?   No   For women: Are you pregnant or is there a chance you could become       pregnant during the next month?   Yes   Have you received any vaccinations in the past 4 weeks?   No     Immunization questionnaire was positive for at least one answer.  Notified Barbara Lyon CNM.        Per orders of Barbara Lyon CNM, injection of Tdap given by Mary Grace Fine LPN. Patient instructed to remain in clinic for 15 minutes afterwards, and to report any adverse reaction to me immediately.       Screening performed by Mary Grace Fine LPN on 12/17/2021 at 11:31 AM.

## 2021-12-17 NOTE — PATIENT INSTRUCTIONS
"[facility] Nurse Midwives [facility]  Contact information:  Appointment line and to get a hold of CNM in clinic Monday-Friday 8 am - 5 pm:  [phone].  There are some clinics with early start times (1st appointment 7:40 am) and others with evening hours (last appointment 6:20 pm).  Most are typically open from 8 am to 5 pm.    CNM on call answering service: [phone].  Specify your hospital of choice and leave a brief message for CNM;  will then page CNM who is on call at your specified hospital and you should receive a call back with 15 minutes.  Be sure that your ringer is audible and that you can accept blocked calls so that we can get back in touch with you! This number should be reserved for urgent needs if during the day, before 8 am, after 5 pm, weekends, holidays.    Contact the on-call CNM with warning signs, such as:    vaginal bleeding     Vaginal discharge and itching or pain and burning during urination    Leg/calf pain or swelling on one side    severe abdominal pain    nausea and vomiting more than 4-5 times a day, or if you are unable to keep anything down    fever more than 100.4 degrees F.        Meet the Midwives from [facility]  You are invited to an informational meet and greet with [facility] Certified Nurse-Midwives. Our free \"Meet the Midwives\" event is a great opportunity to learn about our midwives' philosophy and experience, the hospitals where we can assist with your birth, and answer questions you may have. Partners, friends, and family are welcome to attend. Currently, this is a virtual event.  Date  First Tuesday of every month at 7 pm.    Link to next (live) meeting  [email or web address]://[email or web address]/classes-and-events/meet-the-midwives-from-[facility]  To Join by Telephone (audio only) Call:   [phone] Phone Conference ID: [phone]#        Touring the [facility] Care Center  At this time we are offering a virtual " tour of the Maternity Care Centers at both [facility] and [facility]:   [facility]: https://www.KidzVuz.org/Locations/[facility]/Maternity-Care-Center  [facility]:   [email or web address]://KidzVuz.org/[facility]-care/the-birthplace/tours  https://www.KidzVuz.org/Locations/[facility]/Maternity-Care-Center/#virtual_tour  When in person tours become available, registrations is required. To schedule a tour at either [facility] or [facility], please do so online using the following links:  [facility] - [email or web address]/[email or web address]&[email or web address]&[email or web address]&[email or web address]&it=1&ekx=898  [facility] - [email or web address]/[email or web address]&[email or web address]&[email or web address]&[email or web address]&[email or web address]&[email or web address]&it=1&cpg=217       DAILY FETAL MOVEMENT COUNTING    One of the best ways to keep track of a healthy baby is to notice its movements. Healthy babies are very active. However, some perfectly normal babies may sleep quietly as long as 60 minutes without moving. Babies who are having problems are sluggish and move less. Counting these movements can provide your nurse-midwife with a warning of developing problems.    You should begin this counting at the around your 7th to 8th month of your pregnancy (28-32 weeks) if you are ever concerned that there is less movement than normal for your baby.     INSTRUCTIONS    1.  If able, drink 2 glasses of fluid and lie down on one side.  Put your hand on your abdomen.  2. Count 10 separate times that the baby moves. A movement may be a kick, turn, or flip of the baby.  3.  Record the time you feel the 10th movement. When you count the 10th movement, stop counting.  4.  If one hour passes with less than 10 movements, drink a large glass of fruit juice. Then count for the another hour. If you do not reach 10 movements, call the midwives    REMEMBER      The baby may move all 10 times in an hour or less.    The baby may take up to five hours to  move 10 times.    The important thing is to know what is normal for your baby so you can tell your nurse-midwife when something different is happening.    CALL THE NURSE-MIDWIFE IF:      You do not feel 10 movements in five hours.    You have not felt the baby move all day.    It is taking longer and longer each day to get the 10th movement.      Pregnancy: Your Third Trimester Changes  How You Are Changing  Your body is preparing for the birth of your baby. Some of the most common changes are listed below. If you have any questions or concerns, ask your midwife.    You ll gain more weight from fluids, extra blood, and fat deposits. Your baby will gain an average of an ounce per day (a half a pound a week)!    Your breasts will grow as your body gets ready to feed the baby. They may be more tender. You may also notice a slight yellow or white discharge from the nipples.    Discharge from your vagina may increase. This is normal.    You might see some skin color changes on your forehead, cheeks, or nose. Most of these will go away after you deliver.  How Your Baby Is Growing    Month 7  Your baby is about 14 inches long. If born prematurely (too early), your baby would likely survive with special care.   Month 8  Your baby is building up body fat. Baby kicks strongly, but is getting too big to move around much.   Month 9  Your baby weighs nearly 7 pounds and is about 18-20 inches long. In other words, any day now...       UNDERSTANDING  LABOR    Going into labor before your 37th week of pregnancy is called  labor.  labor can cause your baby to be born too soon. This can lead to a number of health problems that may affect your baby. From 28-35 weeks, Patients are advised to be evaluated at [facility] since they have a  Intensive Care Unit (NICU).  -Before labor, the cervix is thick and closed.  -In  labor, the cervix begins to efface (thin) and dilate (open)  over a short period of time    Are You At Risk?  Any pregnant woman can have  labor. It may start for no reason. But these risk factors can increase your chances:    Past  labor or past early birth    Smoking and drug or alcohol use during pregnancy    Multiple fetuses (twins or more)    Problems with the shape of the uterus    Bleeding during the pregnancy    The Dangers of  Birth  A baby born too soon may have health problems. This is because the baby didn t have enough time to mature. The baby then is at risk of:    Not breastfeeding well    Having immature lungs    Bleeding in the brain    Dying    Reaching Term  Your goal is to get as close to term as you can before giving birth. The closer you get to term, the higher your chance of having a healthy baby. Work with your healthcare provider. Together, you can take steps that may keep you from giving birth too early.    Symptoms of  Labor  If you believe you re having  labor, contact the midwives right away. But contractions alone don t mean you re in  labor. What matters more are changes in your cervix (the lower end of the uterus).   Symptoms of  labor include:    five or more contractions per hour    Strong & frequent contractions    Constant menstrual-like cramping    Low-back pain        Mucous or bloody vaginal discharge    Bleeding or spotting in the second or third trimester    Evaluating  Labor  Your midwife will try to find out whether you re in  labor or whether you re just having contractions.She may watch you for a few hours. The following tests may be done:    Pelvic exam to see if your cervix has effaced (thinned) and dilated (opened)    Uterine activity monitoring to detect contractions    Fetal monitoring to check the health of your baby    Ultrasound to check your baby s size and position    Caring for Yourself At Home  If you have contractions  but your cervix is still thick  and closed, the midwife may ask you to do the following at home:    Drink plenty of water.    Do fewer activities.    Rest in bed on your side.    Avoid intercourse and nipple stimulation.    When to Call Your Midwife    Five or more contractions per hour    Bag of water breaks    Bleeding or spotting     If You Need Hospital Care   labor often requires that you have hospital care and complete bed rest. You may have an IV (intravenous) line to get fluids. And you may be given pills or an injection to help prevent contractions. Finally, you may receive medication (corticosteroids) that helps your baby s lungs mature more quickly.    Syphilis Screening:   The [place] Department of Health ([facility]) provided new recommendations for screening during pregnancy. If you are pregnant, [facility] recommends testing three times during your pregnancy: at your first visit, 28 weeks, and after delivery.   Syphilis is:     Caused by a bacteria spread by sexual contact    Rising among [place] women of child-bearing age  Syphilis can:     Be passed on to infants during pregnancy or during delivery and can be life threatening    Be cured. There are ways to protect yourself and your babies.        HEALTHY PREGNANCY CARE: 26-30 WEEKS PREGNANT    You are now in your last trimester of pregnancy. Your baby is growing rapidly, can open and close her eyelids, sometimes get hiccups, and you'll probably feel her moving around more often. Your baby has breathing movements and is gaining about one ounce each day. You may notice heartburn and leg cramps. Your back may ache as your body gets used to your baby's size and length.    Discuss your work situation with your midwife or physician as needed. If you stand for long periods of time, you may need to make changes and take breaks.    Pre-register after 30 weeks online at the hospital where your baby will be born [email or web address]      Be aware of your baby's  activity level. You may be asked to do daily fetal movement counts. Contact your midwife or physician about any decreased movement.    You may have been tested for gestational diabetes today. If you are RH negative, you may have had an additional test and a Rhogam injection.    Consider receiving a Tdap vaccination to protect your baby from Pertussis/whooping cough.    Baby Feeding in the Hospital: Information, Support and Resources    As you prepare for the birth of your child, you will want to consider options for feeding your baby including breast-feeding and/or baby formula. The American Academy of Pediatrics recommends exclusive breast-feeding for the first six months (although any amount of breast-feeding is beneficial).  However, we also understand that breast-feeding is a personal choice and not for everyone. Whether or not you choose to breast-feed, your decision will be respected by our staff.    There are numerous benefits of breast-feeding; here are a few to consider:    Provides antibodies to protect your baby from infections and diseases    The cost: formula can cost over $1,500 per year    Convenience, no warming up or sterilizing bottles and supplies    The physical contact with breastfeeding can make babies feel secure, warm and comforted    What ever my feeding choice, what can I expect after I deliver my baby?    Your baby will usually be placed skin-to-skin immediately following birth. The skin to skin contact between you and your baby will be a special and memorable time. The bonding and attachment comforts your baby and has a positive effect on baby s brain development.     Having your baby  room in  with you also helps you start to learn your baby s body rhythms and sleep cycle.      You will also begin to learn your baby s cues (signals) that he or she is ready to feed.    When do I start to feed my baby?  As soon as possible after your baby s birth, you will be encouraged to begin feeding.   In the first couple of weeks, your baby will eat often.  Breastfeeding babies usually eat at least 8 times in 24 hours.  Babies fed formula usually eat at least 7 times in 24 hours.      Breast-feeding tips:    Get comfortable and use pillows for support.    Have your baby at the level of your breast, facing you,  tummy to tummy .      Touch your nipple to your baby s lips so you baby s mouth opens wide (rooting reflex).  Aim the nipple toward the roof of your baby s mouth. When your baby opens his or her mouth, pull your baby toward your breast to help your baby  latch on  to your nipple and much of the areola area.    Hand expressing your breast milk can assist with latching your baby to your breast, if needed.    Ask for help, breastfeeding may seem awkward or uncomfortable at first, this is normal. There are numerous resources available at [facility], Clinics and beyond.     If your goal is to exclusively breastfeed, avoid using any formula or artificial nipples (including bottles and pacifiers) while you are your baby are learning to breastfeed unless there is a medical reason.       Mixing breastfeeding and formula can interfere with how you begin building your milk supply.  It can impact how you and your baby  learn  to breastfeeding together and alter the natural growth of  good  bacteria in your baby s stomach.    Delay a pacifier or a bottle in the first few weeks until breastfeeding is well established. This is often around 3 weeks of age.    Ask your nurse to show you how to hand express.   Breast milk can be kept in the refrigerator or freezer for later use.    Hospital and Clinic  Resources:  -Schedule an appointment with a [facility] CNM who is also a Lactation Consultant by calling [phone]     -Schedule a clinic appointment with a [facility] CNM with dedicated clinic hours for breastfeeding assistance by calling [phone]. Breastfeeding clinic visits are at [facility] on  Mondays, [facility] on Tuesdays and [facility] on Thursdays.     -Baby Café    Pregnant and interested in breastfeeding?  Need answers to breastfeeding questions?  Want to help breastfeeding moms?  Already breastfeeding and want to meet other moms?    Join us at the Baby Café!    Baby Cafe is a free, drop-in service offering breast-feeding support for pregnant women, breast-feeding mothers and their families.  Come share tips and socialize with other mothers.  Babies and siblings are welcome (no childcare available).    Starting April 2018, Baby Café will be at 4 locations.  Please see below for the Baby Café closest to you!    [facility]  [address] [place] [address]  1st Wednesday: 10am-12pm    [facility]  [address] [place] [address]  3rd Wednesday 4-6pm    [facility]  [address] [place] [address]  4th Wednesday 10am-12:30pm    [facility]  [address] [place] [address]  4th Wednesdays: 4-6pm      Hmong, Hebrew, and Kenyan which is may be available at some sites.    For more information, please contact: Na [email or web address]. or [phone]    -Attend a baby weigh in at [facility].  Lactation consultants are available to answer questions  [facility]: Tuesdays 1:00 - 2:00  [facility]: Mondays 1:00 - 2:00  www.[facility] Center[facility].MakersKit    -Attend one of the New Mama groups at [facility] in [place].  [facility] also offers one-on-one in home and in office lactation consults.   www.[facility].com    -Attend a LeLeche League meeting.  Multiple groups in several locations throughout the [facility]. The meetings are no-cost and always informative breastfeeding education session through [employer] La Leche League  Www.chad.org/  Medication use while breastfeeding: http://toxnet.nlm.nih.gov/newtoxnet/lactmed.htm     Online Resources:    [facility].org/baby sign  up for free online weekly e-mail    [facility].org/maternity    Breastfeedingmadesimple.com    Kabam.SmartAsset (La Leche League)    Normalfed.com    Womenshealth.gov/breastfeeding    Workandpump.com    Breast-feeding Supplies & Pumps:  Talk to your insurance provider or WIC (Women, Infants and Children) to learn more about options available to you. Recent health insurance changes may include additional coverage for supplies and pumps.    Public Health:  Women, Infants and Children Nutrition program (WIC): provides breast-feeding support and education in addition to formal feeding moms. 596-GGX-7840 or http://www.health.[facility].us/divs/fh/wic    Family Health Home Visiting: [facility] Nurse home visits are available. Talk to your provider to see if you qualify. Most [facility] have a program available.    Additional Resources:  La Leche Lemagrret is an international, nonprofit, nonsectarian organization offering information, education, and support to mothers who want to breast-feed their babies. Local groups offer phone help and monthly meetings. Visit [employer] or avelisbiotech.com and us the  Find local support  drop down menu or click on the  Resources  tab.    [place] Breastfeeding Resources: 6-766-704-BABY (3823) toll free    National Breastfeeding Help Line trained breastfeeding peer counselors can help answer common breast-feeding questions by phone. Monday-Friday: English/Chinese  [phone]- [phone] toll free, [phone] (TTY)    [facility] Connection: 651-326-CARE (5301)      Resources:    Childbirth and Parenting Education:       Everyday Miracles:   https://www.everyday-miracles.org/    Free Video Series from [facility]: https://nursing.[facility]/academics/specialty-areas/nurse-midwifery/having-baby-prenatal-videos/having-baby-prenatal-and    MARINA parenting center: http://TapDog."LiveRelay[employer] Inc."/   (332) 851-XLWM  [place]: (education, yoga & wellness) www.7k7k.com."LiveRelay[employer] Inc."  Enlightened Mama:  www.EkahauenedXiaoi Robert.[employer]ber[employer] Technologies   Childbirth collective: (Parent topic nights)  www.childbirthcollective.org/  Hypnobabies:  www.hypnobabiestwinQuote Rollerties.com/  Hypnobirthing:  Http://hypnobirthing.com/  The Birth Hour: https://Fulham/online-childbirth-class/    APPS and Podcasts:   Jossy Wood    Evidence Based Birth  The Birth Hour (for birth stories)   Birthful   Expectful   The Longest Shortest Time  PregnancyPodcast Yarely Nassar    Book Recommendations:   Elizabeth Hellen's Birthing From Within--first few chapters include a new-age tone, you may prefer to skip it and keep going, because there is good stuff later.  This book recommendation covers emotional preparation, but does cover coping with pain, and use of both pharmacological and nonpharmacological methods.    Dr. Hogan' The Pregnancy Book and The Birth Book--the pregnancy book goes month-by month      The Birth Partner by Loren Genao    Womanly Art of Breastfeeding by La Leche League International   Bestfeeding by Elisa Lu--great pictures    Mothering Your Nursing Toddler, by Mabel Talamantes.   Addresses dealing with so many of the challenging behaviors of a nursing toddler.  How Weaning Happens, by La Leche League.  Discusses weaning at all ages, from medically necessary weaning of an infant, all the way up to age 5 (or older), with why/why not, and strategies.  Very empowering book both for deciding to wean and deciding not to.    American College of Nurse-Midwives (ACNM) http://www.midwife.org/; look at the informational handouts at http://www.midwife.org/Share-With-Women     www.mymidwife.org    Mother to Baby (Medication and Herbal guidance in pregnancy): http://www.mothertobaby.org  Toll-Free Hotline: 940.293.4620  LactMed (Medication use while breastfeeding): http://toxnet.nlm.nih.gov/newtoxnet/lactmed.htm    Women's Health.gov:  http://www.womenshealth.gov/a-z-topics/index.html    American pregnancy association -  "http://americanpregnancy.org    Centering Pregnancy (group prenatal care option): http://centeringhealthcare.org    Information about doulas:  Childbirth collective: http://www.childbirthcollective.org/  Doulas of [place] (CHUCK):  www.chuck.org  [place]  project: http://twincitiesdoulaproject.com/     Early Childhood and Family Education (ECFE):  ECFE offers parents hands-on learning experiences that will nourish a lifetime of teachable moments.  http://ecfe.info/ecfe-home/    March of Dimes www.marchLongevity Biotech.Gungroo     FDA - Nutrition  www.mypyramid.gov  Under \"For Consumers,\" click on \"pregnant and breastfeeding women.\"      Centers for Disease Control and Prevention (CDC) - Vaccines : http://www.cdc.gov/vaccines/       When researching information on the web, question the validity of websites.  The [employer] .gov, KODA andEsLifeorg tend to be more reliable information.  If there are a lot of advertisements, be cautious of the information provided. Stay away from blogs and chat rooms please!             Virtual Breastfeeding Support:    During this time of isolation, breastfeeding families need even more community!  Here are some area organizations offering virtual support groups for breastfeeding:      Latch Cafe Support Group, Tuesdays at 10:30 am   Run by TABITHA Moreau of [employer] [facility]act[facility]   Go to [employer] [facility] [employer] [facility]act[employer] Facebook page and click on \"events\" for link   https://www.facebook.com/events/773486867173453/    [facility] Milk Hour, Thursdays at 2:30 pm    Run by TABITHA Tam   Go to [facility] FB page and send message to get link   https://www.[employer].com/healthfoundations/    [facility]/[place] holding virtual meetings the first Tuesday of each month, 8-9 pm, and the   Third Saturday, 10 - 11 am.  Go to [facility] and [place] FB page; " message to get link https://www.InSite Vision.com/LLLofGoldenValley/?hc_location=ufi    [facility] offers a Lactation Lounge every Friday 12pm - 1pm, run by Adria Gunter Leader   Sign up via link at Stalkthis/cbe-lactation   https://www.Stalkthis/cbe-lactation    [facility] is offering virtual support groups every Monday, 10:30 am - 12 pm, run by nurse TABITHA   Https://www.InSite Vision.com/events/103980434507102/    Prenatal Breastfeeding Classes:        [employer] is offering virtual breastfeeding and  care classes:  https://www.Stalkthis/education-workshops    BirthEd childbirth and breastfeeding education offering virtual prenatal breastfeeding classes  https://www.birthedmn.com/workshops

## 2021-12-17 NOTE — PROGRESS NOTES
S: Melody is a  at 28w4d who presents with Timoteo today for a GERMAIN. Reports that she is feeling well, generally. Reports taking her iron supplementation and tolerating well with the exception of some constipation. Interval weight wnl and TWG improving since early pregnancy. Desires printed Rx for breast pump today. Melody and her family plan to travel to [place] for a holiday trip tomorrow, grateful to get away from work and into the sunshine. Offered Tdap today, patient accepts. Plans GCT and 28 week labs. Reports no s/sx of preeclamsia. Reports regular fetal movement. Denies regular painful contractions, leaking fluid, vaginal bleeding. No other concerns.     O: see prenatal flowsheet      A/P:    Discussed travel plans in the context of Covid-19 and cold/flu season. Encouraged infection prevention strategies like hand hygiene and diligent mask wearing, especially around people outside of her family.     Plans Covid booster when can fit in appointment after [place] trip    Discussed ways to relieve constipation like increasing fluids, increase fiber through foods and offered option for fiber supplement like Metamucil-starting with a small amount and increasig slowly to avoid side effects. Discussed colace as safe option during pregnancy.    Gave printed breast pump Rx    Reviewed PTL, preeclampsia, fetal kick counting, warning signs and when to call     Labs today: 1 hour GCT, hgb, and RPR    Tdap today    RTC in 2-3 weeks    Patient was seen with student, JUAN Salazar who personally assessed, examined and made clinical decisions reflected in the documentation under my supervision. Barbara Lyon, DNP, APRN, CNM  [facility]  Midwifery

## 2021-12-18 LAB — T PALLIDUM AB SER QL: NONREACTIVE

## 2021-12-29 NOTE — PROGRESS NOTES
Melody presents to [facility] clinic for a routine prenatal visit at 30w4d. Feeling well.  Went to [facility] last week--good trip.  Reports normal fetal movements. [place] [facility] Denies regular painful contractions, bleeding, leaking, changes in fetal movement.   Offers no questions or concerns.   Reviewed 28 week labs. (normal 1 hr GCT 84, Hgb 11.6, RPR neg)  Questions about about how to schedule a COVID booster.    COVID-19 in pregnancy discussed. Pt is vaccinated. Due for booster. Will schedule asap.Pt is washing her hands frequently, and limiting social contact to avoid exposure from coronavirus, wearing a mask when in public. 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 Reviewed COVID19 hospital policies, SARS-CoV-2 PCR testing on admission, waterbirth and nitrous an option if SARS-CoV-2 PCRtest is negative.   Reviewed our recommendation that she take an iron supplement daily to boost her iron stores prior to birth as we anticipate ashortage of blood products due to COVID19 pandemic.   Pt is currently supplementing daily; discussed strategies to manage constipation.     Pregnancy checklist addressed.   Reviewed  laborprecautions, warning signs and when/how to call the on-call CNM.   RTC 2 weeks.

## 2021-12-31 ENCOUNTER — PRENATAL OFFICE VISIT (OUTPATIENT)
Dept: MIDWIFE SERVICES | Facility: CLINIC | Age: 31
End: 2021-12-31
Payer: COMMERCIAL

## 2021-12-31 VITALS
HEIGHT: 64 IN | OXYGEN SATURATION: 98 % | BODY MASS INDEX: 33.02 KG/M2 | SYSTOLIC BLOOD PRESSURE: 118 MMHG | WEIGHT: 193.4 LBS | HEART RATE: 76 BPM | DIASTOLIC BLOOD PRESSURE: 74 MMHG

## 2021-12-31 DIAGNOSIS — Z34.02 ENCOUNTER FOR SUPERVISION OF NORMAL FIRST PREGNANCY IN SECOND TRIMESTER: ICD-10-CM

## 2021-12-31 PROCEDURE — 99207 PR PRENATAL VISIT: CPT | Performed by: MIDWIFE

## 2021-12-31 ASSESSMENT — MIFFLIN-ST. JEOR: SCORE: 1559.32

## 2022-01-02 ENCOUNTER — HEALTH MAINTENANCE LETTER (OUTPATIENT)
Age: 32
End: 2022-01-02

## 2022-01-14 ENCOUNTER — PRENATAL OFFICE VISIT (OUTPATIENT)
Dept: MIDWIFE SERVICES | Facility: CLINIC | Age: 32
End: 2022-01-14
Payer: COMMERCIAL

## 2022-01-14 VITALS
HEIGHT: 64 IN | HEART RATE: 84 BPM | SYSTOLIC BLOOD PRESSURE: 110 MMHG | BODY MASS INDEX: 33.49 KG/M2 | WEIGHT: 196.2 LBS | DIASTOLIC BLOOD PRESSURE: 76 MMHG

## 2022-01-14 DIAGNOSIS — Z34.03 ENCOUNTER FOR SUPERVISION OF NORMAL FIRST PREGNANCY IN THIRD TRIMESTER: Primary | ICD-10-CM

## 2022-01-14 PROCEDURE — 99207 PR PRENATAL VISIT: CPT | Performed by: ADVANCED PRACTICE MIDWIFE

## 2022-01-14 ASSESSMENT — MIFFLIN-ST. JEOR: SCORE: 1572.02

## 2022-01-14 NOTE — PROGRESS NOTES
Here with her spouse Timoteo today for routine prenatal visit at 32w4d. Feeling well. Notes normal active FM and no regular UCs. Wondering about birth plan, reviewed questions. Reviewed overall low weight gain, reports healthy and consistent/regular meals. Encouraged covid booster for her due now and vaccination for spouse, reviewed current covid policies, and discussed what to do if symptomatic with screening resources and need to notify CNMs to assist with recommendations for covid infection in pregnancy. Reviewed upcoming labs and visit schedule. Reviewed warning s/sx and reasons to call. RTC 2 weeks.

## 2022-01-14 NOTE — PATIENT INSTRUCTIONS
"[facility]  Contact information:  Appointment line and to get a hold of CNM in clinic Monday-Friday 8 am - 5 pm:  [phone].  There are some clinics with early start times (1st appointment 7:40 am) and others with evening hours (last appointment 6:20 pm).  Most are typically open from 8 am to 5 pm.    CNM on call answering service: [phone].  Specify your hospital of choice and leave a brief message for CNM;  will then page CNM who is on call at your specified hospital and you should receive a call back with 15 minutes.  Be sure that your ringer is audible and that you can accept blocked calls so that we can get back in touch with you! This number should be reserved for urgent needs if during the day, before 8 am, after 5 pm, weekends, holidays.    Contact the on-call CNM with warning signs, such as:    vaginal bleeding     Vaginal discharge and itching or pain and burning during urination    Leg/calf pain or swelling on one side    severe abdominal pain    nausea and vomiting more than 4-5 times a day, or if you are unable to keep anything down    fever more than 100.4 degrees F.          Meet the Midwives from [facility]  You are invited to an informational meet and greet with [facility] Certified Nurse-Midwives. Our free \"Meet the Midwives\" event is a great opportunity to learn about our midwives' philosophy and experience, the hospitals where we can assist with your birth, and answer questions you may have. Partners, friends, and family are welcome to attend. Currently, this is a virtual event.  Date  First Tuesday of every month at 7 pm.    Link to next (live) meeting  [email or web address]://[email or web address]/classes-and-events/meet-the-midwives-from-[facility]  To Join by Telephone (audio only) Call:   [phone] Phone Conference ID: [phone]          Touring the Maternity Care Center  At this time we are offering a " "virtual tour of the Maternity Care Centers at both [facility] and [facility]:   [facility]: https://www.[place].org/Locations/[facility]/Maternity-Care-Center  [place]:   https://Cavis microcapsorg/[facility]/the-birthplace/tours  https://www.[place].org/Locations/[facility]/Maternity-Care-Center/#virtual_tour  When in person tours become available, registrations is required. To schedule a tour at either [place] or [facility], please do so online using the following links:  [facility] - [email or web address]://www.[email or web address] Registry/registerlist.[email or web address]?[email or web address]&[email or web address]&[email or web address]&[email or web address]&[email or web address]&[email or web address]&it=1&ven=146  [facility] - [email or web address]://www.FullCircle Registry/registerlist.[email or web address]?s=[email or web address]&[email or web address]&[email or web address]&p=[email or web address]&[email or web address]&ps=1&group=38&it=1&rso=495      Pre-registration for Hospital Stay:  Sometime betweeen 30-37 weeks, it is recommended that you \"pre-register\" for your upcoming hospital stay on our website:    [email or web address]    Breastfeeding and Birth Control  How do I decide what birth control method is best for me while I am breastfeeding?  Choosing a method of birth control is very personal. First, answer the following questions:      Do you want to have more children?    How much spacing between births do you want for your children?    Do you smoke or have you had any health problems, such as liver disease or a blood clot?  Talk about the answers to each of these questions with your health care provider to help you choose the best method for you.    Can I use breastfeeding as my birth control?  Using breastfeeding as your birth control (the lactational amenorrhea method) can be a good way to keep from getting pregnant in the first months after the baby is born. Each time your baby nurses, your body releases a hormone called prolactin, which stops your body from making the hormones that cause you to ovulate (release an egg). If you are not ovulating, you " cannot get pregnant.    The lactational amenorrhea method works only if:    you have not started your period yet.    you are breastfeeding only and not giving your baby any other food or drink.    you are breastfeeding at least every 4 hours during the day and every 6 hours at night.    your baby is less than 6 months old.  When any 1 of these 4 things is not happening, you no longer have good protection from getting pregnant, and you should use another form of birth control.    What birth control methods are safe for me to use while I breastfeed?    Methods without hormones  Methods without hormones do not affect you, your baby, or your breastfeeding.  Methods without hormones that are the most effective    The copper intrauterine contraceptive device (IUD) (ParaGard) is a small, T-shaped device that is in- serted into your uterus (womb) through the vagina and cervix. The copper IUD lasts for 10 years.    Sterilization (getting your tubes tied or your partner having a vasectomy) is very effective, but it is per- manent. You should choose sterilization only if you do not want to have more children.  A method without hormones that is effective    The lactational amenorrhea method described above is effective for the first 6 months.  Methods without hormones that are less effective    Natural family planning is monitoring your body for signs of ovulation and not having sex when you think you are ovulating. This method is reliable only if you are having regular periods every month.    Barrier methods (condoms, diaphragms, sponges, and spermicides) are used at the time you have sex. These methods are effective only if you use them correctly every time.    Methods with hormones  Birth control methods that use hormones can be used while you are breastfeeding. They may have a small effect on lowering the amount of milk you make. All hormones will get into your breast milk in very small amounts, but there is no known harm  to your baby from this small amount of hormone in breast milk.only methodsmethods use only 1 hormone, called progestin. You can start them right after your baby is born or wait 4 to 6 weeks to make sure your milk supply is good.     Progestin-only pills ( minipills ): If you like to take pills every day, you can use the minipill. In order forpill to work well, you have to take 1 at the same time each day. When you stop breastfeeding, you should start pills that have both estrogen and progestin because they are better at keeping you from get- ting pregnant.     Progestin IUD (Mirena): The progestin IUD is shaped and inserted into the uterus like the copper IUD. It works for up to 5 years. Both IUDs are usually inserted 4 to 6 weeks after the baby is born.    Progestin implant (Nexplanon): The progestin implant is a small matchstick-sized flexible gonzalze. It is placed into the fatty tissue in the back of your arm. It works for up to 3 years.    Progestin shot (Depo-Provera): The progestin shot is given every 3 months.    estrogen and progestin methods  These methods use 2 hormones, called estrogen and progestin.     These methods increase your risk of a blood clot, which is already higher than normal after you have a baby. You should not use them until your baby is at least 6 weeks old. The combined methods are not recommended as the first choice for women who are breastfeeding. If a combined method is the one that you feel will be best for you to prevent getting pregnant, these methods are okay to use while breastfeeding.     Combined birth control pills: You take a pill each day.    Vaginal ring (NuvaRing): The ring is worn in the vagina for 3 weeks then left out for 1 week before youin a new ring.    Patch (Ortho Evra): The patch is placed on your skin and changed every week for 3 weeks then left off forweek before putting a new patch on a different area of your skin.    Pediatric Care Providers at [facility]  [facility]:    Choosing the right provider is one of the most important decisions you ll make about your health care. We can help you find the right one. Remember, you re looking for a provider you can trust and work with to improve your health and well-being, so take time to think about what you need. Depending on how complicated your health care needs are, you may need to see more than one type of provider.    Primary Care Providers: You ll see a primary care provider first for most health issues. They ll work with you to get your recommended screenings, help you manage chronic conditions, and refer you to other types of providers if you need them. Your primary care provider may be called a family physician or doctor, internist, general practitioner, nurse practitioner, or physician s assistant. Your child or teenager s provider may be called a pediatrician.  Specialists: You ll see a specialist for certain services or to treat specific conditions. Specialists include cardiologists, oncologists, psychologists, allergists, podiatrists, and orthopedists. You may need a referral from your primary care provider before you go to a specialist in order for your health plan to pay for your visit.\pardHere are some tips for finding a provider where you live:  If you already have a provider you like and want to keep working with, call their office and ask if they accept your coverage.  Call your insurance company or state Medicaid and CHIP program. Look at their website or check your member handbook to find providers in your network who take your health coverage.  Ask your friends or family if they have providers they like and use these tools to compare health care providers in your area.    Family Medicine at  [facility]:     https://[email or web address]/specialties/family-medicine    Many of our families enjoy all seeing the same doctor, who comes to know the whole family very  well. We base our practice on the knowledge of the patient in the context of family and community.  WHY CHOOSE A FAMILY MEDICINE PHYSICIAN?  Ability of the whole family to see the same doctor  Focus on the whole person, including physical and emotional health  A personal relationship with their doctor that is nurtured over time  Respect for individual and family beliefs and values  No need to change primary care providers when a certain age is reached  Coordination of care when other health care services are needed    Pediatrics at  [facility]:   [email or web address]://[email or web address]/specialties/pediatric-care    Through a teaching affiliation with the [facility], [facility] staff keeps current on new developments in the field of pediatrics.     Everything we do centers around caring for children. We place special emphasis on wellness and prevention.pediatric care team includes a team of pediatricians and certified nurse practitioners who provide care to pediatric and adolescent patients ages 0 to 18, and some up to the age of 26. We offer preventive health maintenance for healthy children as well the diagnosis and treatment of common and chronic illnesses and injuries. In addition, we also offer several pediatric specialists who focus on adolescent health issues and developmental and behavioral issues.    Circumcision    Educational video:     https://www.ProLink Solutions.[email or web address]    What is circumcision?  At birth, baby boys have loose skin that covers the head of the penis. This skin is called the foreskin. When all or part of the foreskin of the penis is cut off, this is called circumcision.  Why is circumcision done?  Circumcision is done for many reasons including Jainism, cultural, looks, and health. Some Jainism groups circumcise all boys as a hafsa-based practice. Many people in the [place] choose to circumcise their baby  boys because they believe it is culturally normal. It is not a common practice in [place], [place], or [place].  Some parents choose circumcision so that their son will have a penis that looks like his father s if the father was also circumcised. Other people choose circumcision because they believe it is  or will protect the boy or man from infection or cancer later in life.  Does circumcision protect against infection or cancer?  Circumcision does seem to protect against some types of infection or cancer. Cancer of the penis is one type of cancer that circumcision may prevent. However, cancer of the penis is very rare. One hundred thousand circumcisions would need to be done to prevent one case of cancer of the penis. Circumcision may also decrease the chance of some sexually transmitted infections, such as HIV and human papilloma virus (HPV). See the next page for more information on the risks and benefits of circumcision.  What happens during a circumcision?  Babies born in the hospital are usually circumcised before they go home. Health care providers also perform circumcisions in their offices and clinics within a few weeks after birth.  Mu-ism circumcisions are most often done at home or in a Mandaeism.  Before the circumcision is performed, some providers give an injection (shot) of a small amount of anesthetic (numbing medicine) at the base of the penis to block the pain or put an anesthetic cream on the penis to numb the area that will be cut.  There are 2 different ways to do a circumcision. In one type, a clamp placed around the head of the penis cuts off the blood supply to the foreskin, and the foreskin above the clamp is cut off. The clamp is left on the penis until the area heals and it falls off a few days later. In another type of circumcision, the foreskin is cut off with scissors or a scalpel.  After the circumcision, petroleum jelly and sometimes gauze may be put over the area of the  penis where the skin was removed. This protects the end of the penis while it heals.  Can I keep my son s penis  if it is circumcised?  Regular washing with soap and water will keep any penis clean. Circumcision does not make the penis . Uncircumcised boys do need to be taught to clean beneath their foreskin, just like they need to be taught to wash their hands or brush their teeth.  How do I decide if I should have my son circumcised?  The American Academy of Pediatrics (AAP) says that  circumcision may have health benefits. They do not recommend circumcision for all boys as a routine procedure. The AAP recommends that you talk to your health care provider to decide if circumcision is the right choice for your family. You may also wish to discuss the question with your family or .  What are the risks and benefits of circumcision?  We do not have a lot of good scientific information about the health risks or benefits of circumcision.  Possible Risks:  Very few baby boys (less than 1 in 100) will have a problem after circumcision, such as bleeding or mild infection of the penis. These problems are usually not serious and are easy to treat.  Less common problems are:    Removal of too much or too little foreskin  Some rare problems are:    Narrowing of the opening of the penis, which can cause problems with urination    Removal of part of the penis or death of some of the other skin on the penis   Infection that spreads to other parts of the body  People used to think babies did not really feel pain. Now we know that they do. Many baby boys appear to feel a lot of pain during circumcision if anesthesia is not used.  We do not know if circumcision affects sexual function or sensation.  Possible Benefits:    Less risk for some kinds of cancers, like cancer of the penis    Fewer urinary tract (bladder or kidney) infections for babies    Less risk for some sexually transmitted  infections, such as HIV, herpes, and HPV     May protect female sexual partners from some sexually transmitted infections    For More Information  American Academy of Family Physicians: Circumcision  http://familydoctor.org/familydoctor/en/pregnancy-newborns/caring-for-newborns/infant- care/circumcision.html  MedlinePlus: Circumcision (includes a slide show on the procedure)  www.nlm.nih.gov/medlineplus/circumcision.html  American Academy of Pediatrics: Policy statement on circumcision  Http://pediatrics.aappublications.org/content/130/3/e756.abstract      Childbirth and Parenting Education:         Everyday Miracles:   https://www.everyday-miracles.org/    Free Video Series from [facility]: https://nursing.[facility]/academics/specialty-areas/nurse-midwifery/having-baby-prenatal-videos/having-baby-prenatal-and    MARINA parenting [place]: http://[facility] SurgeryGuang[facility] Del[facility]/   [phone]) 383-[phone]  [place]: (education, yoga & wellness) www.Kanari  Enlightened Mama: www.enlightenedmama.com   Childbirth collective: (Parent topic nights)  www.childbirthcollective.org/  Hypnobabies:  www.hypnobabiestResourceKraftcities.Vino Volo/  Hypnobirthing:  Http://hypnobirthing.com/  The Birth Hour: https://THUBIT/online-childbirth-class/    APPS and Podcasts:   Jossy Moss Nurture    Evidence Based Birth  The Birth Hour (for birth stories)   Birthful   Expectful   The Longest Shortest Time  PregnancyPodcast Yarely Nassar    Book Recommendations:   Elizabeth Hannacroix's Birthing From Within--first few chapters include a new-age tone, you may prefer to skip it and keep going, because there is good stuff later.  This book recommendation covers emotional preparation, but does cover coping with pain, and use of both pharmacological and nonpharmacological methods.    Dr. Hogan' The Pregnancy Book and The Birth Book--the pregnancy book goes month-by month      The Birth Partner by Loren Genao    Womanly Art of Breastfeeding by La Leche League  "International   Bestfeeding by Elisa Lu--great pictures    Mothering Your Nursing Toddler, by Mabel Talamantes.   Addresses dealing with so many of the challenging behaviors of a nursing toddler.  How Weaning Happens, by La Leche League.  Discusses weaning at all ages, from medically necessary weaning of an infant, all the way up to age 5 (or older), with why/why not, and strategies.  Very empowering book both for deciding to wean and deciding not to.    American College of Nurse-Midwives (ACNM) http://www.midwife.org/; look at the informational handouts at http://www.midwife.org/Share-With-Women     www.mymidwife.org    Mother to Baby (Medication and Herbal guidance in pregnancy): http://www.mothertobaby.org  Toll-Free Hotline: 436.505.5157  LactMed (Medication use while breastfeeding): http://toxnet.nlm.nih.gov/newtoxnet/lactmed.htm    Women's Health.gov:  http://www.womenshealth.gov/a-z-topics/index.html    American pregnancy association - http://americanpregnancy.org    Centering Pregnancy (group prenatal care option): http://centeringhealthcare.org    Information about doulas:  Childbirth collective: http://www.childbirthcollective.org/  Doulas of [place] (CHUCK):  www.chuck.org  [facility]  project: http://twincitiesdoulaproject.com/     Early Childhood and Family Education (ECFE):  ECFE offers parents hands-on learning experiences that will nourish a lifetime of teachable moments.  http://ecfe.info/ecfe-home/    March of Dimes www.marchofdimes.com     FDA - Nutrition  www.mypyramid.gov  Under \"For Consumers,\" click on \"pregnant and breastfeeding women.\"      Centers for Disease Control and Prevention (CDC) - Vaccines : http://www.cdc.gov/vaccines/       When researching information on the web, question the validity of websites.  The domains .gov, .edu and.org tend to be more reliable information.  If there are a lot of advertisements, be cautious of the information provided. Stay away from " "blogs and chat rooms please!             Virtual Breastfeeding Support:    During this time of isolation, breastfeeding families need even more community!  Here are some area organizations offering virtual support groups for breastfeeding:      [employer] Support Group,  at 10:30 am   Run by Mara Hernandez, IBCLC of [facility]   Go to [facility] Facebook page and click on \"events\" for link   https://www.facebook.com/events/308601896736632/    [facility] Milk Hour,  at 2:30 pm    Run by TABITHA Tam   Go to [facility] FB page and send message to get link   https://www.[employer].Cashually/[facility]/    [facility] holding virtual meetings the first Tuesday of each month, 8-9 pm, and the   Third Saturday, 10 - 11 am.  Go to [facility] and [place] FB page; message to get link https://www.EndoShape/LLLof[place]/?hc_location=[facility] offers a Lactation Lounge every Friday 12pm - 1pm, run by Lin GunterWise Health System [facility] Campusmargret Leader   Sign up via link at uAfrica/cbe-lactation   https://www.uAfrica/cbe-lactation    [facility] is offering virtual support groups every Monday, 10:30 am - 12 pm, run by nurse IBCLC   Https://www.facebook.com/events/468648687669984/    Prenatal Breastfeeding Classes:        [facility] is offering virtual breastfeeding and  care classes:  https://www.uAfrica/education-workshops    BirthEd childbirth and breastfeeding education offering virtual prenatal breastfeeding classes  https://www.birthedmn.com/workshops    "

## 2022-01-28 ENCOUNTER — PRENATAL OFFICE VISIT (OUTPATIENT)
Dept: MIDWIFE SERVICES | Facility: CLINIC | Age: 32
End: 2022-01-28
Payer: COMMERCIAL

## 2022-01-28 VITALS
HEIGHT: 64 IN | HEART RATE: 84 BPM | WEIGHT: 199.1 LBS | BODY MASS INDEX: 33.99 KG/M2 | DIASTOLIC BLOOD PRESSURE: 82 MMHG | SYSTOLIC BLOOD PRESSURE: 120 MMHG

## 2022-01-28 DIAGNOSIS — Z34.03 ENCOUNTER FOR SUPERVISION OF NORMAL FIRST PREGNANCY IN THIRD TRIMESTER: Primary | ICD-10-CM

## 2022-01-28 PROCEDURE — 99207 PR PRENATAL VISIT: CPT | Performed by: MIDWIFE

## 2022-01-28 ASSESSMENT — MIFFLIN-ST. JEOR: SCORE: 1585.17

## 2022-01-28 NOTE — PATIENT INSTRUCTIONS
"[facility] Nurse Midwives [facility]  Contact information:  Appointment line and to get a hold of CNM in clinic Monday-Friday 8 am - 5 pm:  [phone].  There are some clinics with early start times (1st appointment 7:40 am) and others with evening hours (last appointment 6:20 pm).  Most are typically open from 8 am to 5 pm.    CNM on call answering service: [phone].  Specify your hospital of choice and leave a brief message for CNM;  will then page CNM who is on call at your specified hospital and you should receive a call back with 15 minutes.  Be sure that your ringer is audible and that you can accept blocked calls so that we can get back in touch with you! This number should be reserved for urgent needs if during the day, before 8 am, after 5 pm, weekends, holidays.    Contact the on-call CNM with warning signs, such as:    vaginal bleeding     Vaginal discharge and itching or pain and burning during urination    Leg/calf pain or swelling on one side    severe abdominal pain    nausea and vomiting more than 4-5 times a day, or if you are unable to keep anything down    fever more than 100.4 degrees F.          Meet the Midwives from [facility]  You are invited to an informational meet and greet with [facility] Certified Nurse-Midwives. Our free \"Meet the Midwives\" event is a great opportunity to learn about our midwives' philosophy and experience, the hospitals where we can assist with your birth, and answer questions you may have. Partners, friends, and family are welcome to attend. Currently, this is a virtual event.  Date  First Tuesday of every month at 7 pm.    Link to next (live) meeting  [email or web address]-[email or web address]-the-midwives-from-[facility]  To Join by Telephone (audio only) Call:   [phone] Phone Conference ID: [id number]          Touring the Maternity Care Center  At this time we are offering a " "virtual tour of the Maternity Care Centers at both [facility] and [facility]:   [facility]: https://www.[employer].org/Locations/[facility]/Maternity-Care-Center  [place]:   https://Babytreeorg/[facility]/the-birthplace/tours  https://www.[employer].org/Locations/[facility]/Maternity-Care-Center/#virtual_tour  When in person tours become available, registrations is required. To schedule a tour at either [place] or [facility], please do so online using the following links:  [facility] - [email or web address]&[email or web address]&[email or web address]&[email or web address]&[email or web address]&[email or web address]  [facility] - [email or web address].Apsalar/[email or web address]?[email or web address]&[email or web address]&[email or web address]&p=[email or web address]&[email or web address]&ps=1&[email or web address]&it=1&ppu=667      Pre-registration for Hospital Stay:  Sometime betweeen 30-37 weeks, it is recommended that you \"pre-register\" for your upcoming hospital stay on our website:    [email or web address]://sslforms.[employer].org/preregistration/he.asp    Breastfeeding and Birth Control  How do I decide what birth control method is best for me while I am breastfeeding?  Choosing a method of birth control is very personal. First, answer the following questions:      Do you want to have more children?    How much spacing between births do you want for your children?    Do you smoke or have you had any health problems, such as liver disease or a blood clot?  Talk about the answers to each of these questions with your health care provider to help you choose the best method for you.    Can I use breastfeeding as my birth control?  Using breastfeeding as your birth control (the lactational amenorrhea method) can be a good way to keep from getting pregnant in the first months after the baby is born. Each time your baby nurses, your body releases a hormone called prolactin, which stops your body from making the hormones that cause you to ovulate (release an egg). If you are not ovulating, you " cannot get pregnant.    The lactational amenorrhea method works only if:    you have not started your period yet.    you are breastfeeding only and not giving your baby any other food or drink.    you are breastfeeding at least every 4 hours during the day and every 6 hours at night.    your baby is less than 6 months old.  When any 1 of these 4 things is not happening, you no longer have good protection from getting pregnant, and you should use another form of birth control.    What birth control methods are safe for me to use while I breastfeed?    Methods without hormones  Methods without hormones do not affect you, your baby, or your breastfeeding.  Methods without hormones that are the most effective    The copper intrauterine contraceptive device (IUD) (ParaGard) is a small, T-shaped device that is in- serted into your uterus (womb) through the vagina and cervix. The copper IUD lasts for 10 years.    Sterilization (getting your tubes tied or your partner having a vasectomy) is very effective, but it is per- manent. You should choose sterilization only if you do not want to have more children.  A method without hormones that is effective    The lactational amenorrhea method described above is effective for the first 6 months.  Methods without hormones that are less effective    Natural family planning is monitoring your body for signs of ovulation and not having sex when you think you are ovulating. This method is reliable only if you are having regular periods every month.    Barrier methods (condoms, diaphragms, sponges, and spermicides) are used at the time you have sex. These methods are effective only if you use them correctly every time.    Methods with hormones  Birth control methods that use hormones can be used while you are breastfeeding. They may have a small effect on lowering the amount of milk you make. All hormones will get into your breast milk in very small amounts, but there is no known harm  to your baby from this small amount of hormone in breast milk.only methodsmethods use only 1 hormone, called progestin. You can start them right after your baby is born or wait 4 to 6 weeks to make sure your milk supply is good.     Progestin-only pills ( minipills ): If you like to take pills every day, you can use the minipill. In order forpill to work well, you have to take 1 at the same time each day. When you stop breastfeeding, you should start pills that have both estrogen and progestin because they are better at keeping you from get- ting pregnant.     Progestin IUD (Mirena): The progestin IUD is shaped and inserted into the uterus like the copper IUD. It works for up to 5 years. Both IUDs are usually inserted 4 to 6 weeks after the baby is born.    Progestin implant (Nexplanon): The progestin implant is a small matchstick-sized flexible gonzalez. It is placed into the fatty tissue in the back of your arm. It works for up to 3 years.    Progestin shot (Depo-Provera): The progestin shot is given every 3 months.    estrogen and progestin methods  These methods use 2 hormones, called estrogen and progestin.     These methods increase your risk of a blood clot, which is already higher than normal after you have a baby. You should not use them until your baby is at least 6 weeks old. The combined methods are not recommended as the first choice for women who are breastfeeding. If a combined method is the one that you feel will be best for you to prevent getting pregnant, these methods are okay to use while breastfeeding.     Combined birth control pills: You take a pill each day.    Vaginal ring (NuvaRing): The ring is worn in the vagina for 3 weeks then left out for 1 week before youin a new ring.    Patch (Ortho Evra): The patch is placed on your skin and changed every week for 3 weeks then left off forweek before putting a new patch on a different area of your skin.    Pediatric Care Providers at [facility]  Nurse Midwives [facility]:    Choosing the right provider is one of the most important decisions you ll make about your health care. We can help you find the right one. Remember, you re looking for a provider you can trust and work with to improve your health and well-being, so take time to think about what you need. Depending on how complicated your health care needs are, you may need to see more than one type of provider.    Primary Care Providers: You ll see a primary care provider first for most health issues. They ll work with you to get your recommended screenings, help you manage chronic conditions, and refer you to other types of providers if you need them. Your primary care provider may be called a family physician or doctor, internist, general practitioner, nurse practitioner, or physician s assistant. Your child or teenager s provider may be called a pediatrician.  Specialists: You ll see a specialist for certain services or to treat specific conditions. Specialists include cardiologists, oncologists, psychologists, allergists, podiatrists, and orthopedists. You may need a referral from your primary care provider before you go to a specialist in order for your health plan to pay for your visit.\pardHere are some tips for finding a provider where you live:  If you already have a provider you like and want to keep working with, call their office and ask if they accept your coverage.  Call your insurance company or state Medicaid and CHIP program. Look at their website or check your member handbook to find providers in your network who take your health coverage.  Ask your friends or family if they have providers they like and use these tools to compare health care providers in your area.    Family Medicine at  [facility] Nurse Midwives [facility]:     https://www.[place].org/specialties/family-medicine    Many of our families enjoy all seeing the same doctor, who comes to know the whole family very  well. We base our practice on the knowledge of the patient in the context of family and community.  WHY CHOOSE A FAMILY MEDICINE PHYSICIAN?  Ability of the whole family to see the same doctor  Focus on the whole person, including physical and emotional health  A personal relationship with their doctor that is nurtured over time  Respect for individual and family beliefs and values  No need to change primary care providers when a certain age is reached  Coordination of care when other health care services are needed    Pediatrics at  [facility]:   [email or web address]://[email or web address].[email or web address]/specialties/pediatric-care    Through a teaching affiliation with the [facility], [facility] staff keeps current on new developments in the field of pediatrics.     Everything we do centers around caring for children. We place special emphasis on wellness and prevention.pediatric care team includes a team of pediatricians and certified nurse practitioners who provide care to pediatric and adolescent patients ages 0 to 18, and some up to the age of 26. We offer preventive health maintenance for healthy children as well the diagnosis and treatment of common and chronic illnesses and injuries. In addition, we also offer several pediatric specialists who focus on adolescent health issues and developmental and behavioral issues.    Circumcision    Educational video:     [email or web address]    What is circumcision?  At birth, baby boys have loose skin that covers the head of the penis. This skin is called the foreskin. When all or part of the foreskin of the penis is cut off, this is called circumcision.  Why is circumcision done?  Circumcision is done for many reasons including Synagogue, cultural, looks, and health. Some Synagogue groups circumcise all boys as a hafsa-based practice. Many people in the [place] choose to circumcise their baby  boys because they believe it is culturally normal. It is not a common practice in [place], [place], or [place].  Some parents choose circumcision so that their son will have a penis that looks like his father s if the father was also circumcised. Other people choose circumcision because they believe it is  or will protect the boy or man from infection or cancer later in life.  Does circumcision protect against infection or cancer?  Circumcision does seem to protect against some types of infection or cancer. Cancer of the penis is one type of cancer that circumcision may prevent. However, cancer of the penis is very rare. One hundred thousand circumcisions would need to be done to prevent one case of cancer of the penis. Circumcision may also decrease the chance of some sexually transmitted infections, such as HIV and human papilloma virus (HPV). See the next page for more information on the risks and benefits of circumcision.  What happens during a circumcision?  Babies born in the hospital are usually circumcised before they go home. Health care providers also perform circumcisions in their offices and clinics within a few weeks after birth.  Buddhist circumcisions are most often done at home or in a Sabianist.  Before the circumcision is performed, some providers give an injection (shot) of a small amount of anesthetic (numbing medicine) at the base of the penis to block the pain or put an anesthetic cream on the penis to numb the area that will be cut.  There are 2 different ways to do a circumcision. In one type, a clamp placed around the head of the penis cuts off the blood supply to the foreskin, and the foreskin above the clamp is cut off. The clamp is left on the penis until the area heals and it falls off a few days later. In another type of circumcision, the foreskin is cut off with scissors or a scalpel.  After the circumcision, petroleum jelly and sometimes gauze may be put over the area of the  penis where the skin was removed. This protects the end of the penis while it heals.  Can I keep my son s penis  if it is circumcised?  Regular washing with soap and water will keep any penis clean. Circumcision does not make the penis . Uncircumcised boys do need to be taught to clean beneath their foreskin, just like they need to be taught to wash their hands or brush their teeth.  How do I decide if I should have my son circumcised?  The American Academy of Pediatrics (AAP) says that  circumcision may have health benefits. They do not recommend circumcision for all boys as a routine procedure. The AAP recommends that you talk to your health care provider to decide if circumcision is the right choice for your family. You may also wish to discuss the question with your family or .  What are the risks and benefits of circumcision?  We do not have a lot of good scientific information about the health risks or benefits of circumcision.  Possible Risks:  Very few baby boys (less than 1 in 100) will have a problem after circumcision, such as bleeding or mild infection of the penis. These problems are usually not serious and are easy to treat.  Less common problems are:    Removal of too much or too little foreskin  Some rare problems are:    Narrowing of the opening of the penis, which can cause problems with urination    Removal of part of the penis or death of some of the other skin on the penis   Infection that spreads to other parts of the body  People used to think babies did not really feel pain. Now we know that they do. Many baby boys appear to feel a lot of pain during circumcision if anesthesia is not used.  We do not know if circumcision affects sexual function or sensation.  Possible Benefits:    Less risk for some kinds of cancers, like cancer of the penis    Fewer urinary tract (bladder or kidney) infections for babies    Less risk for some sexually transmitted  infections, such as HIV, herpes, and HPV     May protect female sexual partners from some sexually transmitted infections    For More Information  American Academy of Family Physicians: Circumcision  http://familydoctor.org/familydoctor/en/pregnancy-newborns/caring-for-newborns/infant- care/circumcision.html  MedlinePlus: Circumcision (includes a slide show on the procedure)  www.nlm.nih.gov/medlineplus/circumcision.html  American Academy of Pediatrics: Policy statement on circumcision  Http://pediatrics.aappublications.org/content/130/3/e756.abstract      Childbirth and Parenting Education:         Everyday Miracles:   https://www.everyday-miracles.org/    Free Video Series from [facility]: https://nursing.[facility]/academics/specialty-areas/nurse-midwifery/having-baby-prenatal-videos/having-baby-prenatal-and    MARINA parenting [place]: http://Caro CenterElevate Research/   (661) 623-INUG  [place]: (education, yoga & wellness) www.Spot Influence  Enlightened Mama: www.enlightenedmama.com   Childbirth collective: (Parent topic nights)  www.childbirthcollective.org/  Hypnobabies:  www.hypnobabiestLendProcities.[employer]/  Hypnobirthing:  Http://hypnobirthing.com/  The Birth Hour: https://Sponduu/online-childbirth-class/    APPS and Podcasts:   Jossy Moss Nurture    Evidence Based Birth  The Birth Hour (for birth stories)   Birthful   Expectful   The Longest Shortest Time  PregnancyPodcast Yarely Nassar    Book Recommendations:   Elizabeth Estill's Birthing From Within--first few chapters include a new-age tone, you may prefer to skip it and keep going, because there is good stuff later.  This book recommendation covers emotional preparation, but does cover coping with pain, and use of both pharmacological and nonpharmacological methods.    Dr. Hogan' The Pregnancy Book and The Birth Book--the pregnancy book goes month-by month      The Birth Partner by Loren Genao    Womanly Art of Breastfeeding by La Leche League  "International   Bestfeeding by Elisa Lu--great pictures    Mothering Your Nursing Toddler, by Mabel Talamantes.   Addresses dealing with so many of the challenging behaviors of a nursing toddler.  How Weaning Happens, by La Leche League.  Discusses weaning at all ages, from medically necessary weaning of an infant, all the way up to age 5 (or older), with why/why not, and strategies.  Very empowering book both for deciding to wean and deciding not to.    American College of Nurse-Midwives (ACNM) http://www.midwife.org/; look at the informational handouts at http://www.midwife.org/Share-With-Women     www.mymidwife.org    Mother to Baby (Medication and Herbal guidance in pregnancy): http://www.mothertobaby.org  Toll-Free Hotline: 460.223.6204  LactMed (Medication use while breastfeeding): http://toxnet.nlm.nih.gov/newtoxnet/lactmed.htm    Women's Health.gov:  http://www.womenshealth.gov/a-z-topics/index.html    American pregnancy association - http://americanpregnancy.org    Centering Pregnancy (group prenatal care option): http://centeringhealthcare.org    Information about doulas:  Childbirth collective: http://www.childbirthcollective.org/  Doulas of [place] (CHUCK):  www.chuck.org  [facility]  project: http://twincitiesdoulaproject.com/     Early Childhood and Family Education (ECFE):  ECFE offers parents hands-on learning experiences that will nourish a lifetime of teachable moments.  http://ecfe.info/ecfe-home/    March of Dimes www.marchofdimes.com     FDA - Nutrition  www.mypyramid.gov  Under \"For Consumers,\" click on \"pregnant and breastfeeding women.\"      Centers for Disease Control and Prevention (CDC) - Vaccines : http://www.cdc.gov/vaccines/       When researching information on the web, question the validity of websites.  The domains .gov, .edu and.org tend to be more reliable information.  If there are a lot of advertisements, be cautious of the information provided. Stay away from " "blogs and chat rooms please!             Virtual Breastfeeding Support:    During this time of isolation, breastfeeding families need even more community!  Here are some area organizations offering virtual support groups for breastfeeding:      [employer] [employer] Support Group,  at 10:30 am   Run by Mara Hernandez, IBCLC of [facility]   Go to [facility] Facebook page and click on \"events\" for link   https://www.facebook.com/events/633654587350249/    [facility] Milk Hour,  at 2:30 pm    Run by TABITHA Tam   Go to [facility] FB page and send message to get link   https://www.Quantros.[employer]/[facility]und[facility]/    [facility]/[place] holding virtual meetings the first Tuesday of each month, 8-9 pm, and the   Third Saturday, 10 - 11 am.  Go to [facility] and [place] FB page; message to get link [email or web address]://[email or web address]/LLLofGolden[place]ley/?hc_location=[facility] offers a Lactation Lounge every Friday 12pm - 1pm, run by Lin GunterMethodist Southlake Hospitalmargret Leader   Sign up via link at Yekra/cbe-lactation   [email or web address]://www.Yekra/cbe-lactation    [facility] is offering virtual support groups every Monday, 10:30 am - 12 pm, run by nurse IBCLC   Https://www.facebook.com/events/669757990338265/    Prenatal Breastfeeding Classes:        [facility] is offering virtual breastfeeding and  care classes:  https://www.Yekra/education-workshops    BirthEd childbirth and breastfeeding education offering virtual prenatal breastfeeding classes  https://www.birthedmn.com/workshops    "

## 2022-01-28 NOTE — PROGRESS NOTES
Melody is here with Timoteo for her routine prenatal appt at 34 weeks 4days gestation. Reviewed plan for GBS and Hgb testing next visit. Undecided about birth control, initially planning abstinence, has resources of other birth control options if needed.    Feeling baby move, denies contractions or change in vaginal discharge.   Advised to make appt in 2 weeks. Reviewed recommendation for daily iron supplementation for all pregnant women at this time, she is currently supplementing once daily and will continue We discussed current recommendations for prenatal appointments.   - Patient is vaccinated against COVID-19. Reviewed recommendations from ACOG and SMFM to complete COVID-19 vaccination before, during, or after pregnancy or lactation.  - Reinforced COVID-19 precautions including: social distancing of at least 6 feet, avoiding gatherings of 10 persons or more, frequent hand hygiene, avoiding discretionary travel, avoiding touching of face, and cleaning and disinfecting frequently touched surfaces daily.  Encouraged household members to follow the same guidelines.    - If suspected exposure to COVID-19 or onset of fever and symptoms, such as cough or difficulty breathing to seek testing and notify healthcare team     - Reinforced current hospital policy restricting visitors to 2 healthy adults (age >18)  for the entire duration of stay on labor and delivery and postpartum. At present, doulas are excluded from this restriction.    Reviewed how to reach CNM with non-urgent and urgent concerns between visits.

## 2022-02-11 ENCOUNTER — PRENATAL OFFICE VISIT (OUTPATIENT)
Dept: MIDWIFE SERVICES | Facility: CLINIC | Age: 32
End: 2022-02-11
Payer: COMMERCIAL

## 2022-02-11 VITALS
HEIGHT: 64 IN | BODY MASS INDEX: 34.4 KG/M2 | DIASTOLIC BLOOD PRESSURE: 64 MMHG | SYSTOLIC BLOOD PRESSURE: 120 MMHG | WEIGHT: 201.5 LBS | HEART RATE: 76 BPM

## 2022-02-11 DIAGNOSIS — E61.1 IRON DEFICIENCY: ICD-10-CM

## 2022-02-11 DIAGNOSIS — Z34.03 ENCOUNTER FOR SUPERVISION OF NORMAL FIRST PREGNANCY IN THIRD TRIMESTER: Primary | ICD-10-CM

## 2022-02-11 LAB
HGB BLD-MCNC: 11.9 G/DL (ref 11.7–15.7)
HOLD SPECIMEN: NORMAL

## 2022-02-11 PROCEDURE — 99207 PR PRENATAL VISIT: CPT | Performed by: MIDWIFE

## 2022-02-11 PROCEDURE — 36415 COLL VENOUS BLD VENIPUNCTURE: CPT | Performed by: MIDWIFE

## 2022-02-11 PROCEDURE — 87653 STREP B DNA AMP PROBE: CPT | Performed by: MIDWIFE

## 2022-02-11 PROCEDURE — 85018 HEMOGLOBIN: CPT | Performed by: MIDWIFE

## 2022-02-11 ASSESSMENT — MIFFLIN-ST. JEOR: SCORE: 1596.06

## 2022-02-11 NOTE — PATIENT INSTRUCTIONS
"[facility] Nurse Midwives - Contact information:  Appointment line and to get a hold of CNM in clinic Monday-Friday 8 am - 5 pm:  [phone].  There are some clinics with early start times (1st appointment 7:40 am) and others with evening hours (last appointment 6:20 pm).  Most are typically open from 8 am to 5 pm.    CNM on call answering service: [phone].  Specify your hospital of choice and leave a brief message for CNM;  will then page CNM who is on call at your specified hospital and you should receive a call back with 15 minutes.  Be sure that your ringer is audible and that you can accept blocked calls so that we can get back in touch with you! This number should be reserved for urgent needs if during the day, before 8 am, after 5 pm, weekends, holidays.    Contact the on-call CNM with warning signs, such as:    vaginal bleeding     Vaginal discharge and itching or pain and burning during urination    Leg/calf pain or swelling on one side    severe abdominal pain    nausea and vomiting more than 4-5 times a day, or if you are unable to keep anything down    fever more than 100.4 degrees F.       Meet the Midwives from [facility]  You are invited to an informational meet and greet with [facility] Certified Nurse-Midwives. Our free \"Meet the Midwives\" event is a great opportunity to learn about our midwives' philosophy and experience, the hospitals where we can assist with your birth, and answer questions you may have. Partners, friends, and family are welcome to attend. Currently, this is a virtual event.  Date  First Tuesday of every month at 7 pm.    Link to next (live) meeting  https://[email or web address].[place].org/classes-and-events/meet-the-midwives-from-[facility]-clinics  To Join by Telephone (audio only) Call:   [phone] Phone Conference ID: [id number]#          Touring the Maternity Care Center  At this time we are offering a virtual tour of the " Maternity Care Centers at both [facility] and [facility]:   [facility]: https://www.[place].org/Locations/[facility]/Maternity-Care-Center  [place]:   https://[facility] CenterAudienceRate Ltd.org/overarching-care/the-birthplace/tours  https://www.[place].org/Locations/[facility]/Maternity-Care-Center/#virtual_tour  When in person tours become available, registrations is required. To schedule a tour at either [place] or [facility], please do so online using the following links:  [facility] - https://www.[employer]/registerlist.asp?s=6&m=303&vs=5&p=2&diclh=328&ps=1&group=37&it=1&ezx=981  [facility] - https://www.[employer]/registerlist.asp?s=6&m=303&vs=5&p=2&gazly=563&ps=1&group=38&it=1&ahm=922    Childbirth and Parenting Education:         Everyday Miracles:   https://www.everyday-miracles.org/    Free Video Series from [facility]: https://nursing.[facility]/academics/specialty-areas/nurse-midwifery/having-baby-prenatal-videos/having-baby-prenatal-and    MARINA parenting center: http://[facility] CenterSureBooks.Frugalo/   [phone]  [place]: (education, yoga & wellness) www.[place].Frugalo  Enlightened Mama: www.enlightenedmama.com   Childbirth collective: (Parent topic nights)  www.childbirthcollective.org/  Hypnobabies:  www.hypnobabiestwincities.com/  Hypnobirthing:  Http://hypnobirthing.com/  The Birth Hour: https://NubankbirthhouShopgate/online-childbirth-class/    APPS and Podcasts:   Jossy Moss Nurture    Evidence Based Birth  The Birth Hour (for birth stories)   Birthful   Expectful   The Longest Shortest Time  PregnancyPodcast Yarely Nassar    Book Recommendations:   Elizabeth Hellen's Birthing From Within--first few chapters include a new-age tone, you may prefer to skip it and keep going, because there is good stuff later.  This book recommendation covers emotional preparation, but does cover coping with pain, and use of both pharmacological and nonpharmacological  "methods.    Dr. Hogan' The Pregnancy Book and The Birth Book--the pregnancy book goes month-by month      The Birth Partner by Loren Genao    Womanly Art of Breastfeeding by La Leche League International   Breastfeeding by Elisa Lu--great pictures    Mothering Your Nursing Toddler, by Mabel Talamantes.   Addresses dealing with so many of the challenging behaviors of a nursing toddler.  How Weaning Happens, by La Leche League.  Discusses weaning at all ages, from medically necessary weaning of an infant, all the way up to age 5 (or older), with why/why not, and strategies.  Very empowering book both for deciding to wean and deciding not to.    American College of Nurse-Midwives (ACNM) http://www.midwife.org/; look at the informational handouts at http://www.midwife.org/Share-With-Women     www.mymidwife.org    Mother to Baby (Medication and Herbal guidance in pregnancy): http://www.mothertobaby.org  Toll-Free Hotline: 343.796.8322  LactMed (Medication use while breastfeeding): http://toxnet.nlm.nih.gov/newtoxnet/lactmed.htm    Women's Health.gov:  http://www.womenshealth.gov/a-z-topics/index.html    American pregnancy association - http://americanpregnancy.org    Centering Pregnancy (group prenatal care option): http://centeringhealthcare.org    Information about doulas:  Childbirth collective: http://www.childbirthcollective.org/  Doulas of [place] (CHUCK):  www.chuck.org  [facility]  project: http://twincitiesdoulaproject.com/     Early Childhood and Family Education (ECFE):  ECFE offers parents hands-on learning experiences that will nourish a lifetime of teachable moments.  http://ecfe.info/ecfe-home/    March of Dimes www.FoodyDirect.Acquaintable     FDA - Nutrition  www.mypyramid.gov  Under \"For Consumers,\" click on \"pregnant and breastfeeding women.\"      Centers for Disease Control and Prevention (CDC) - Vaccines : http://www.cdc.gov/vaccines/       When researching information on the web, " question the validity of websites.  The [employer] .gov, .edu and.org tend to be more reliable information.  If there are a lot of advertisements, be cautious of the information provided. Stay away from blogs and chat rooms please!     [place] Screening Program  Http://www.health.[facility].[place].us/newbornscreening/  [place] newborns are tested soon after birth for more than 50 hidden, rare disorders, including hearing loss and critical congenital heart disease (CCHD). This site provides resources and information for families and providers.    HEALTHY PREGNANCY CARE: 37 to 41 WEEKS PREGNANT    Talk with your midwife or physician about when to call with signs of labor    Regular uterine contractions that are getting closer together and/or stronger    If you think your water has broken or is leaking    Bleeding from the vagina like a period (bloody vaginal discharge is normal)    If you are not feeling your baby move    Make plans for transportation and  as needed for when you are going to the hospital.    Your midwife or physician may offer to check your cervix for changes.     Ask your health care provider about vaccinations you may need following delivery. By now, you should have received a Tdap immunization to protect against pertussis or whooping cough. Fathers and family members who will be in close contact with the baby should also receive a Tdap shot at least two weeks before the expected birth of the baby if they have not had a Td (tetanus) shot for at least two years.    If you are past your due date, discuss the next steps leading to delivery with your midwife or physician. If you don't start labor on your own by 41 or 42 weeks, your midwife or physician may recommend giving you medicines to ripen your cervix and start labor.  Induction of labor:  http://onlinelibrary.brody.com/store/10.1016/j.jmwh.2008.04.018/asset/j.jmwh.2008.04.018.pdf?v=1&t=kvbm9pir&e=56eo294b8is77y61t2r0ot3y871189u2zr8lu694    Preparing for your baby: Tell your midwife or physician how you plan to feed your baby (breast or bottle), who you have chosen to do pediatric care for your baby, and if you have a boy, whether you have chosen to have him circumcised. You will need a car seat correctly installed in your vehicle to bring your baby home. As you start to set up the nursery at home for your baby, make sure the crib is safe. The mattress needs to fit snugly against the edges of the crib. If you can fit a soda can between the bars, they are too far apart and can allow the baby's head to caught between them.    Learn about infant care and feeding, including information about infant CPR. We recommend that you put your baby to sleep on his or her back to reduce the chance of Sudden Infant Death Syndrome (SIDS). To maintain a healthy environment in which your child can grow, it's best to keep your home smoke-free. By preparing ahead, your transition into parenthood will go smoothly for you and your baby.    Your midwife or physician will want to see you for a checkup 2 to 6 weeks after delivery.    If you have questions about any symptoms you are experiencing or any other concerns, call your provider or their clinic staff at [facility] at Dept: [phone]. If it's after clinic hours, physician patients should call the Care Connection at [phone] ([phone]); midwife patients should call their answering service at [phone].    How can you care for yourself at home?   You can refer to the Starting Out Right book or find it online at http://www.healtheast.org/images/stories/maternity/[facility]-Starting-Out-Right.pdf or http://www.healtheast.org/images/stories/flipbooks/[facility]-starting-out-right/[facility]-starting-out-right.html#p=8     You can sign up  for a weekly parenting e-mail that gives support, tips and advice from health care professionals that starts with pregnancy and continues through the toddler years. To register, go to www.[facility].org/baby at any time during your pregnancy.        Making Plans for Feeding My Baby    By this point, you probably have read a lot about feeding your baby.  Breastfeed or formula? Each mother s decision is her own and [facility] respects you and your choices. We ve gathered information on both breastfeeding and formula feeding to help with your decision. Talking with your physician or nurse-midwife can also help in your decision.  However you plan to feed your baby, [facility] Maternity Care Centers encourage rooming in with your baby, skin-to-skin contact and feeding your baby based on his or her cues.    Skin-to-skin contact  Being close to mom helps your baby adjust to life outside of the womb.  It helps your baby regulate their body temperature, heart rate, and breathing.  Your baby will usually be placed skin-to-skin immediately following birth or as soon as possible, if medical intervention is needed.    Rooming-In  Having your baby stay with you in your room is called  rooming-in .  Keeping your baby in your room helps you to learn how to care for your baby by getting to know your baby s cues, body rhythms and sleep cycle.       Cue-based feeding  Cues (signals) are baby s way of telling you what he or she wants.  When you learn your infant s cues, you know how to care for and feed your baby.   Feeding cues are the licking and smacking of lips, bringing their fist to their mouth, and a reflex called  rooting - where baby turns and opens his or her mouth, searching for the breast or bottle.  Crying is a late feeding cue.  Babies can feed frequently, often at least 8 times in 24 hours.    Breastfeeding facts  Breast milk is the best source of nutrition for your baby and is available at birth. In the first couple of  days, your milk volume is already starting to increase, though it may not be noticeable. Breastfeed frequently to increase your milk supply. Within three to five days, you will begin to notice larger milk volumes. An increase in breast size, heaviness and firmness are often described as the milk  coming in.  Frequent breastfeeding can help breasts from getting overly firm and painful. You will know the baby is getting enough milk if your baby is having wet and dirty diapers and gaining weight.     If your goal is to exclusively breastfeed, it is important to not use any formula or artificial nipples (including bottles and pacifiers) while your baby is learning to breastfeed.  While it may seem like an  easy  option to give your baby a bottle, formula should only be given if there is a medical reason for your baby to have it.      Positioning and attachment   Get comfortable.  Use pillows as needed to support your arms and baby.  Hold baby close at the level of your breast, facing you in a tummy to tummy position.  Skin to skin helps with this.  Position the baby with his or her nose by the nipple.  There should be a straight line from baby s ear to shoulder to hips.  Tickle your baby s lips or wait for baby to open mouth wide, bring baby to breast by leading with the chin.  Aim the nipple at the roof of baby s mouth.  A rapid sucking pattern is followed by longer, drawing pattern with occasional swallows heard.  When baby is correctly latched, your nipple and much of the areola are pulled well into baby s mouth.      Returning to work or school  Focus on a good start to breastfeeding.  Many women continue to provide breastmilk for their baby when they return to work or school.  Making plans about where to pump and store milk can make the transition go well.  Talk with other mothers who have also returned to work or school for tips and support.  Your employer s Human Resource department may be a resource as well.      Returning to work or school: (continued)     babies can mean fewer  sick  days for you.    A quality breast pump will also save time and add comfort.  Check with your insurance prior to giving birth for breast pump coverage.  Many insurance companies include a pump within your benefits.    Wait until your baby is at least three weeks old to introduce a bottle for the first time.  Have someone besides you give the bottle.    Breastfeed when you are with your baby. Reserve your bottles of breast milk for when you are away.     Your breasts will need to be  emptied  either by your baby or a pump.  Plan to pump at least twice in an eight hour day.    If you cannot pump at work, continue breastfeeding at home. Any amount of breast milk is worth giving to your baby.    Formula feeding facts  If you are planning to use formula to feed your baby, you will want to make some preparations ahead of time. Talk to your doctor or nurse-midwife about what type of formula to use. Some are iron-fortified, meaning they have extra iron in them. You will want to purchase formula and bottles before your baby is born to be sure you are ready after you return from the hospital. The [facility] do not provide formula samples to take home.    Be sure to follow formula mixing directions closely. Regular milk in the dairy case at the grocery store should not be given to babies under 1 year old. Baby formula is sold in several forms including:    Ready-to-use. This is the most expensive, but no mixing is necessary.    Concentrated liquid. This is less expensive than ready-to-use and you mix with water.    Powder. This is the least expensive. You mix one level scoop of powdered formula with two ounces of water and stir well.    Most babies need 2.5 ounces of formula per pound of body weight each day. This means an 8-pound baby may drink about 20 ounces of formula a day; however, this is just an estimate. The most important  thing is to pay attention to your baby s cues.  If your baby is always fussy, needs more iron or has certain food allergies, your physician may suggest you change your baby s formula to a different kind.     How do I warm my baby s bottles?  You may feed your baby a bottle without warming it first. It is OK for the breast milk or formula to be cool or room temperature. If your baby seems to prefer it warmed, you can put the filled bottle in a container of warm water and let it stand for a few minutes. Check the temperature of the liquid on your skin before feeding it to your baby; to be sure it isn t too hot. Do not heat bottles in the microwave. Microwaves heat food and liquids unevenly, and this can cause hot spots that can burn your baby.    How do I clean and sterilize bottles?  Sterilize bottles and nipples before you use them for the first time. You can do this by putting them in boiling water for 5 minutes. After that first time, you can wash them in hot and soapy water. Rinse them carefully to be sure there is no soap left on them. You can also wash them in the .    [facility] [phone] ([phone])    Baby Café    Pregnant and interested in breastfeeding?  Need answers to breastfeeding questions?  Want to help breastfeeding moms?  Already breastfeeding and want to meet other moms?    Join us at the Baby Café!    Baby Cafe is a free, drop-in service offering breast-feeding support for pregnant women, breast-feeding mothers and their families.  Come share tips and socialize with other mothers.  Babies and siblings are welcome (no childcare available).    Starting April 2018, Baby Café will be at 4 locations.  Please see below for the Baby Café closest to you!      [facility]  [address] [place] [address]  1st Wednesday: 10am-12pm    [facility]  [address] [place] [address]  3rd Wednesday 4-6pm    [facility]  [address] [place], [place]  "[id number]   10am-12:30pm    [facility]  [address] [place] [address]  : 4-6pm      Hmong, Greenlandic, and Malaysian which is may be available at some sites.    For more information, please contact: Na [email or web address]@[email or web address]. or [phone]      Virtual Breastfeeding Support:    During this time of isolation, breastfeeding families need even more community!  Here are some area organizations offering virtual support groups for breastfeeding:      [facility] Support Group,  at 10:30 am   Run by TABITHA Moreau of [employer] [facility]   Go to [employer] [facility] [facility] Facebook page and click on \"events\" for link   [email or web address]://[email or web address].[email or web address]    [facility] Milk Hour,  at 2:30 pm    Run by TABITHA Tam   Go to [facility] FB page and send message to get link   https://www.Devicescape.Webcrunch/healthfoundations/    [facility] holding virtual meetings the first Tuesday of each month, 8-9 pm, and the   Third Saturday, 10 - 11 am.  Go to [facility] and [place] FB page; message to get link [email or web address]://www.Devicescape.Webcrunch/LLLofGolden[place]/?hc_location=[facility]    [facility] offers a Lactation Lounge every Friday 12pm - 1pm, run by [facility] Leader   Sign up via link at Schoology/cbe-lactation   [email or web address]://www.Schoology/cbe-lactation    [facility] is offering virtual support groups every Monday, 10:30 am - 12 pm, run by nurse IBCLC   Https://www.[email or web address]/events/[email or web address]/    Prenatal Breastfeeding Classes:        [facility] is offering virtual breastfeeding and  care classes:  https://www.Schoology/education-workshops    BirthEd childbirth and breastfeeding education offering virtual prenatal breastfeeding " classes  https://www.APR.com/workshops

## 2022-02-11 NOTE — PROGRESS NOTES
Melody is here by herself for her routine prenatal appt at 36weeks 4 days gestation. Reviewed plan to collect labs today.   Feeling baby move, denies regular contractions or change in vaginal discharge. Advised to make appt in 1 weeks. Reviewed recommendation for daily iron supplementation for all pregnant women at this time, she is currently supplementing once daily and will continue We discussed current recommendations for prenatal appointments.   - Patient is vaccinated against COVID-19.   - Reinforced COVID-19 precautions including: social distancing of at least 6 feet, avoiding gatherings of 10 persons or more, frequent hand hygiene, avoiding discretionary travel, avoiding touching of face, and cleaning and disinfecting frequently touched surfaces daily.  Encouraged household members to follow the same guidelines.    - Reinforced current hospital policy restricting visitors to 1 healthy adults (age >18)  for the entire duration of stay on labor and delivery and postpartum. At present, doulas are excluded from this restriction.    Reviewed how to reach CNM with non-urgent and urgent concerns between visits.

## 2022-02-12 LAB
GP B STREP DNA SPEC QL NAA+PROBE: NEGATIVE
PATIENT PENICILLIN, AMOXICILLIN, CEPHALOSPORINS ALLERGY: NO

## 2022-02-16 NOTE — PROGRESS NOTES
Here with Timoteo today for routine prenatal visit at 37w4d. Reports feeling well, notes normal end of pregnancy discomforts. Notes active FM and no regular UCs. Reviewed labs from last visit wnl; advised continue with daily iron supplement through pregnancy and early postpartum. Prepping for baby's arrival. Offers no questions today. Discussed preparation for labor and supports. Advised on upcoming labs and visit schedule. Reviewed warning s/sx and reasons to call. RTC 1 weeks.

## 2022-02-16 NOTE — PATIENT INSTRUCTIONS
"[facility] Nurse Midwives - Contact information:  Appointment line and to get a hold of CNM in clinic Monday-Friday 8 am - 5 pm:  [phone].  There are some clinics with early start times (1st appointment 7:40 am) and others with evening hours (last appointment 6:20 pm).  Most are typically open from 8 am to 5 pm.    CNM on call answering service: [phone].  Specify your hospital of choice and leave a brief message for CNM;  will then page CNM who is on call at your specified hospital and you should receive a call back with 15 minutes.  Be sure that your ringer is audible and that you can accept blocked calls so that we can get back in touch with you! This number should be reserved for urgent needs if during the day, before 8 am, after 5 pm, weekends, holidays.    Contact the on-call CNM with warning signs, such as:    vaginal bleeding     Vaginal discharge and itching or pain and burning during urination    Leg/calf pain or swelling on one side    severe abdominal pain    nausea and vomiting more than 4-5 times a day, or if you are unable to keep anything down    fever more than 100.4 degrees F.       Meet the Midwives from [facility]  You are invited to an informational meet and greet with [facility] Certified Nurse-Midwives. Our free \"Meet the Midwives\" event is a great opportunity to learn about our midwives' philosophy and experience, the hospitals where we can assist with your birth, and answer questions you may have. Partners, friends, and family are welcome to attend. Currently, this is a virtual event.  Date  First Tuesday of every month at 7 pm.    Link to next (live) meeting  https://www.[employer]bent.org/classes-and-events/meet-the-midwives-from-[facility]-clinics  To Join by Telephone (audio only) Call:   [phone] Phone Conference ID: [id number]#          Touring the Maternity Care Center  At this time we are offering a virtual tour of the " Maternity Care Centers at both [facility] and [facility] [place]:   [facility]: https://www.[place].org/Locations/[facility]/Maternity-Care-Center  [place]:   https://Critical access hospitalezNetPay.org/overarching-care/the-birthplace/tours  https://www.[place].org/Locations/[facility]/Maternity-Care-Center/#virtual_tour  When in person tours become available, registrations is required. To schedule a tour at either [place] or [facility], please do so online using the following links:  [facility] - https://www.Naiku/registerlist.asp?s=[email or web address]&m=303&vs=5&p=2&[email or web address]&ps=1&group=37&it=1&jpc=767  [facility] - https://www.Naiku/registerlist.asp?s=6&m=303&vs=5&p=2&xvcty=617&ps=1&group=38&it=1&zew=134    Childbirth and Parenting Education:         Everyday Miracles:   https://www.everyday-miracles.org/    Free Video Series from [facility]: https://nursing.[facility]/academics/specialty-areas/nurse-midwifery/having-baby-prenatal-videos/having-baby-prenatal-and    MARINA parenting center: http://[facility] Hospital[facility].[place] [place]/   [phone]  [place]: (education, yoga & wellness) www.[employer]  Enlightened Mama: www.enlightenedmama.com   Childbirth collective: (Parent topic nights)  www.childbirthcollective.org/  Hypnobabies:  www.hypnobabiestwincities.com/  Hypnobirthing:  Http://hypnobirthing.com/  The Birth Hour: https://DennoobirthhouInfinia/online-childbirth-class/    APPS and Podcasts:   Jossy Moss Nurture    Evidence Based Birth  The Birth Hour (for birth stories)   Birthful   Expectful   The Longest Shortest Time  PregnancyPodcast Yarely Nassar    Book Recommendations:   Elizabeth Hellen's Birthing From Within--first few chapters include a new-age tone, you may prefer to skip it and keep going, because there is good stuff later.  This book recommendation covers emotional preparation, but does cover coping with pain, and use of both pharmacological and nonpharmacological  "methods.    Dr. Hogan' The Pregnancy Book and The Birth Book--the pregnancy book goes month-by month      The Birth Partner by Loren Genao    Womanly Art of Breastfeeding by La Leche League International   Breastfeeding by Elisa Lu--great pictures    Mothering Your Nursing Toddler, by Mabel Talamantes.   Addresses dealing with so many of the challenging behaviors of a nursing toddler.  How Weaning Happens, by La Leche League.  Discusses weaning at all ages, from medically necessary weaning of an infant, all the way up to age 5 (or older), with why/why not, and strategies.  Very empowering book both for deciding to wean and deciding not to.    American College of Nurse-Midwives (ACNM) http://www.midwife.org/; look at the informational handouts at http://www.midwife.org/Share-With-Women     www.mymidwife.org    Mother to Baby (Medication and Herbal guidance in pregnancy): http://www.mothertobaby.org  Toll-Free Hotline: 642.916.7539  LactMed (Medication use while breastfeeding): http://toxnet.nlm.nih.gov/newtoxnet/lactmed.htm    Women's Health.gov:  http://www.womenshealth.gov/a-z-topics/index.html    American pregnancy association - http://americanpregnancy.org    Centering Pregnancy (group prenatal care option): http://centeringhealthcare.org    Information about doulas:  Childbirth collective: http://www.childbirthcollective.org/  Doulas of [place] (CHUCK):  www.chuck.org  [facility]  project: http://twincitiesdoulaproject.com/     Early Childhood and Family Education (ECFE):  ECFE offers parents hands-on learning experiences that will nourish a lifetime of teachable moments.  http://ecfe.info/ecfe-home/    March of Dimes www.[place]o.[facility]     FDA - Nutrition  www.mypyramid.gov  Under \"For Consumers,\" click on \"pregnant and breastfeeding women.\"      Centers for Disease Control and Prevention (CDC) - Vaccines : http://www.cdc.gov/vaccines/       When researching information on the web, " question the validity of websites.  The MentorDOTMe .gov, .edu and.org tend to be more reliable information.  If there are a lot of advertisements, be cautious of the information provided. Stay away from blogs and chat rooms please!     [place] Screening Program  Http://www.health.[facility].[place].us/newbornscreening/  [place] newborns are tested soon after birth for more than 50 hidden, rare disorders, including hearing loss and critical congenital heart disease (CCHD). This site provides resources and information for families and providers.    HEALTHY PREGNANCY CARE: 37 to 41 WEEKS PREGNANT    Talk with your midwife or physician about when to call with signs of labor    Regular uterine contractions that are getting closer together and/or stronger    If you think your water has broken or is leaking    Bleeding from the vagina like a period (bloody vaginal discharge is normal)    If you are not feeling your baby move    Make plans for transportation and  as needed for when you are going to the hospital.    Your midwife or physician may offer to check your cervix for changes.     Ask your health care provider about vaccinations you may need following delivery. By now, you should have received a Tdap immunization to protect against pertussis or whooping cough. Fathers and family members who will be in close contact with the baby should also receive a Tdap shot at least two weeks before the expected birth of the baby if they have not had a Td (tetanus) shot for at least two years.    If you are past your due date, discuss the next steps leading to delivery with your midwife or physician. If you don't start labor on your own by 41 or 42 weeks, your midwife or physician may recommend giving you medicines to ripen your cervix and start labor.  Induction of labor:  http://onlinelibrary.brody.com/store/10.1016/j.jmwh.2008.04.018/asset/j.jmwh.2008.04.018.pdf?v=1&t=bwvn5uhn&i=05si169v9yu91a62w4f7lx4s380507n9yf0jp233    Preparing for your baby: Tell your midwife or physician how you plan to feed your baby (breast or bottle), who you have chosen to do pediatric care for your baby, and if you have a boy, whether you have chosen to have him circumcised. You will need a car seat correctly installed in your vehicle to bring your baby home. As you start to set up the nursery at home for your baby, make sure the crib is safe. The mattress needs to fit snugly against the edges of the crib. If you can fit a soda can between the bars, they are too far apart and can allow the baby's head to caught between them.    Learn about infant care and feeding, including information about infant CPR. We recommend that you put your baby to sleep on his or her back to reduce the chance of Sudden Infant Death Syndrome (SIDS). To maintain a healthy environment in which your child can grow, it's best to keep your home smoke-free. By preparing ahead, your transition into parenthood will go smoothly for you and your baby.    Your midwife or physician will want to see you for a checkup 2 to 6 weeks after delivery.    If you have questions about any symptoms you are experiencing or any other concerns, call your provider or their clinic staff at [facility] at Dept: [phone]. If it's after clinic hours, physician patients should call the Care Connection at [phone] ([phone]); midwife patients should call their answering service at [phone].    How can you care for yourself at home?   You can refer to the Starting Out Right book or find it online at http://www.[facility]altheast.org/images/stories/maternity/[facility]-Starting-Out-Right.pdf or http://www.healtheast.org/images/stories/flipbooks/[facility]-starting-out-right/[facility]-starting-out-right.html#p=8     You can sign up  for a weekly parenting e-mail that gives support, tips and advice from health care professionals that starts with pregnancy and continues through the toddler years. To register, go to www.[facility].org/baby at any time during your pregnancy.        Making Plans for Feeding My Baby    By this point, you probably have read a lot about feeding your baby.  Breastfeed or formula? Each mother s decision is her own and [facility] respects you and your choices. We ve gathered information on both breastfeeding and formula feeding to help with your decision. Talking with your physician or nurse-midwife can also help in your decision.  However you plan to feed your baby, [facility] Maternity Care Centers encourage rooming in with your baby, skin-to-skin contact and feeding your baby based on his or her cues.    Skin-to-skin contact  Being close to mom helps your baby adjust to life outside of the womb.  It helps your baby regulate their body temperature, heart rate, and breathing.  Your baby will usually be placed skin-to-skin immediately following birth or as soon as possible, if medical intervention is needed.    Rooming-In  Having your baby stay with you in your room is called  rooming-in .  Keeping your baby in your room helps you to learn how to care for your baby by getting to know your baby s cues, body rhythms and sleep cycle.       Cue-based feeding  Cues (signals) are baby s way of telling you what he or she wants.  When you learn your infant s cues, you know how to care for and feed your baby.   Feeding cues are the licking and smacking of lips, bringing their fist to their mouth, and a reflex called  rooting - where baby turns and opens his or her mouth, searching for the breast or bottle.  Crying is a late feeding cue.  Babies can feed frequently, often at least 8 times in 24 hours.    Breastfeeding facts  Breast milk is the best source of nutrition for your baby and is available at birth. In the first couple of  days, your milk volume is already starting to increase, though it may not be noticeable. Breastfeed frequently to increase your milk supply. Within three to five days, you will begin to notice larger milk volumes. An increase in breast size, heaviness and firmness are often described as the milk  coming in.  Frequent breastfeeding can help breasts from getting overly firm and painful. You will know the baby is getting enough milk if your baby is having wet and dirty diapers and gaining weight.     If your goal is to exclusively breastfeed, it is important to not use any formula or artificial nipples (including bottles and pacifiers) while your baby is learning to breastfeed.  While it may seem like an  easy  option to give your baby a bottle, formula should only be given if there is a medical reason for your baby to have it.      Positioning and attachment   Get comfortable.  Use pillows as needed to support your arms and baby.  Hold baby close at the level of your breast, facing you in a tummy to tummy position.  Skin to skin helps with this.  Position the baby with his or her nose by the nipple.  There should be a straight line from baby s ear to shoulder to hips.  Tickle your baby s lips or wait for baby to open mouth wide, bring baby to breast by leading with the chin.  Aim the nipple at the roof of baby s mouth.  A rapid sucking pattern is followed by longer, drawing pattern with occasional swallows heard.  When baby is correctly latched, your nipple and much of the areola are pulled well into baby s mouth.      Returning to work or school  Focus on a good start to breastfeeding.  Many women continue to provide breastmilk for their baby when they return to work or school.  Making plans about where to pump and store milk can make the transition go well.  Talk with other mothers who have also returned to work or school for tips and support.  Your employer s Human Resource department may be a resource as well.      Returning to work or school: (continued)     babies can mean fewer  sick  days for you.    A quality breast pump will also save time and add comfort.  Check with your insurance prior to giving birth for breast pump coverage.  Many insurance companies include a pump within your benefits.    Wait until your baby is at least three weeks old to introduce a bottle for the first time.  Have someone besides you give the bottle.    Breastfeed when you are with your baby. Reserve your bottles of breast milk for when you are away.     Your breasts will need to be  emptied  either by your baby or a pump.  Plan to pump at least twice in an eight hour day.    If you cannot pump at work, continue breastfeeding at home. Any amount of breast milk is worth giving to your baby.    Formula feeding facts  If you are planning to use formula to feed your baby, you will want to make some preparations ahead of time. Talk to your doctor or nurse-midwife about what type of formula to use. Some are iron-fortified, meaning they have extra iron in them. You will want to purchase formula and bottles before your baby is born to be sure you are ready after you return from the hospital. The [facility] do not provide formula samples to take home.    Be sure to follow formula mixing directions closely. Regular milk in the dairy case at the grocery store should not be given to babies under 1 year old. Baby formula is sold in several forms including:    Ready-to-use. This is the most expensive, but no mixing is necessary.    Concentrated liquid. This is less expensive than ready-to-use and you mix with water.    Powder. This is the least expensive. You mix one level scoop of powdered formula with two ounces of water and stir well.    Most babies need 2.5 ounces of formula per pound of body weight each day. This means an 8-pound baby may drink about 20 ounces of formula a day; however, this is just an estimate. The most important  thing is to pay attention to your baby s cues.  If your baby is always fussy, needs more iron or has certain food allergies, your physician may suggest you change your baby s formula to a different kind.     How do I warm my baby s bottles?  You may feed your baby a bottle without warming it first. It is OK for the breast milk or formula to be cool or room temperature. If your baby seems to prefer it warmed, you can put the filled bottle in a container of warm water and let it stand for a few minutes. Check the temperature of the liquid on your skin before feeding it to your baby; to be sure it isn t too hot. Do not heat bottles in the microwave. Microwaves heat food and liquids unevenly, and this can cause hot spots that can burn your baby.    How do I clean and sterilize bottles?  Sterilize bottles and nipples before you use them for the first time. You can do this by putting them in boiling water for 5 minutes. After that first time, you can wash them in hot and soapy water. Rinse them carefully to be sure there is no soap left on them. You can also wash them in the .    [facility] [phone] ([phone])    Baby Café    Pregnant and interested in breastfeeding?  Need answers to breastfeeding questions?  Want to help breastfeeding moms?  Already breastfeeding and want to meet other moms?    Join us at the Baby Café!    Baby Cafe is a free, drop-in service offering breast-feeding support for pregnant women, breast-feeding mothers and their families.  Come share tips and socialize with other mothers.  Babies and siblings are welcome (no childcare available).    Starting April 2018, Baby Café will be at 4 locations.  Please see below for the Baby Café closest to you!      [facility]  [address] [place] [address]  1st Wednesday: 10am-12pm    [facility]  [address] [place] [address]  3rd Wednesday 4-6pm    [facility]  [address] [place]  "[id number]   10am-12:30pm    [facility]  [address] [place] [address]  : 4-6pm      Hmong, Indonesian, and Monegasque which is may be available at some sites.    For more information, please contact: [email or web address]. or [phone]      Virtual Breastfeeding Support:    During this time of isolation, breastfeeding families need even more community!  Here are some area organizations offering virtual support groups for breastfeeding:      [facility] Support Group,  at 10:30 am   Run by TABITHA Moreau of [employer] [facility]   Go to [employer] Facebook page and click on \"events\" for link   https://www.Rant Network.com/events/[email or web address]/    [facility] Milk Hour,  at 2:30 pm    Run by TABITHA Tam   Go to [facility] FB page and send message to get link   https://www.Rant Network.Exotel/healthfoundations/    [facility] holding virtual meetings the first Tuesday of each month, 8-9 pm, and the   Third Saturday, 10 - 11 am.  Go to [facility] and [place] FB page; message to get link https://www.Rant Network.Exotel/LLLofGoldenValley/?hc_location=[facility]    [facility]resa offers a Lactation Lounge every Friday 12pm - 1pm, run by Heather [facility] Leader   Sign up via link at "[employer] - The Simple, Fast and Elegant Tablet Sales App"/cbe-lactation   https://www."[employer] The Simple, Fast and Elegant Tablet Sales App"/cbe-lactation    [facility] is offering virtual support groups every Monday, 10:30 am - 12 pm, run by nurse IBCLC   Https://www.facebook.com/events/[email or web address]/    Prenatal Breastfeeding Classes:        [facility]resa is offering virtual breastfeeding and  care classes:  https://www."[employer] The Simple, Fast and Elegant Tablet Sales App"/education-workshops    BirthEd childbirth and breastfeeding education offering virtual prenatal breastfeeding " classes  https://www.MedWhat.com/workshops

## 2022-02-18 ENCOUNTER — PRENATAL OFFICE VISIT (OUTPATIENT)
Dept: MIDWIFE SERVICES | Facility: CLINIC | Age: 32
End: 2022-02-18
Payer: COMMERCIAL

## 2022-02-18 VITALS
DIASTOLIC BLOOD PRESSURE: 70 MMHG | HEART RATE: 68 BPM | SYSTOLIC BLOOD PRESSURE: 110 MMHG | WEIGHT: 202.3 LBS | BODY MASS INDEX: 34.54 KG/M2 | HEIGHT: 64 IN

## 2022-02-18 DIAGNOSIS — Z34.03 ENCOUNTER FOR SUPERVISION OF NORMAL FIRST PREGNANCY IN THIRD TRIMESTER: Primary | ICD-10-CM

## 2022-02-18 PROCEDURE — 99207 PR PRENATAL VISIT: CPT | Performed by: ADVANCED PRACTICE MIDWIFE

## 2022-02-25 ENCOUNTER — PRENATAL OFFICE VISIT (OUTPATIENT)
Dept: MIDWIFE SERVICES | Facility: CLINIC | Age: 32
End: 2022-02-25
Payer: COMMERCIAL

## 2022-02-25 VITALS
HEART RATE: 80 BPM | DIASTOLIC BLOOD PRESSURE: 72 MMHG | BODY MASS INDEX: 34.78 KG/M2 | HEIGHT: 64 IN | SYSTOLIC BLOOD PRESSURE: 108 MMHG | WEIGHT: 203.7 LBS

## 2022-02-25 DIAGNOSIS — Z34.03 ENCOUNTER FOR SUPERVISION OF NORMAL FIRST PREGNANCY IN THIRD TRIMESTER: Primary | ICD-10-CM

## 2022-02-25 PROCEDURE — 99207 PR PRENATAL VISIT: CPT | Performed by: ADVANCED PRACTICE MIDWIFE

## 2022-02-25 NOTE — PATIENT INSTRUCTIONS
"[facility] Nurse Midwives - Contact information:  Appointment line and to get a hold of CNM in clinic Monday-Friday 8 am - 5 pm:  [phone].  There are some clinics with early start times (1st appointment 7:40 am) and others with evening hours (last appointment 6:20 pm).  Most are typically open from 8 am to 5 pm.    CNM on call answering service: [phone].  Specify your hospital of choice and leave a brief message for CNM;  will then page CNM who is on call at your specified hospital and you should receive a call back with 15 minutes.  Be sure that your ringer is audible and that you can accept blocked calls so that we can get back in touch with you! This number should be reserved for urgent needs if during the day, before 8 am, after 5 pm, weekends, holidays.    Contact the on-call CNM with warning signs, such as:    vaginal bleeding     Vaginal discharge and itching or pain and burning during urination    Leg/calf pain or swelling on one side    severe abdominal pain    nausea and vomiting more than 4-5 times a day, or if you are unable to keep anything down    fever more than 100.4 degrees F.       Meet the Midwives from [facility]  You are invited to an informational meet and greet with [facility] Certified Nurse-Midwives. Our free \"Meet the Midwives\" event is a great opportunity to learn about our midwives' philosophy and experience, the hospitals where we can assist with your birth, and answer questions you may have. Partners, friends, and family are welcome to attend. Currently, this is a virtual event.  Date  First Tuesday of every month at 7 pm.    Link to next (live) meeting  https://www.[place].org/classes-and-events/meet-the-midwives-from-[facility]-clinics  To Join by Telephone (audio only) Call:   [phone] Phone Conference ID: [id number]#          Touring the Maternity Care Center  At this time we are offering a virtual tour of the " Maternity Care Centers at both [facility] and [facility]:   [facility]: https://www.[place].org/Locations/[facility]/Maternity-Care-Center  [place]:   https://[facility].org/overarching-care/the-birthplace/tours  https://www.[place].org/Locations/[facility]/Maternity-Care-Center/#virtual_tour  When in person tours become available, registrations is required. To schedule a tour at either [place] or [facility], please do so online using the following links:  [facility] - [email or web address]://www.Sendmail/[email or web address]?s=[email or web address]&[email or web address]&[email or web address]&p=2&[email or web address]&ps=1&[email or web address]&it=1&zfy=[email or web address]  [facility] - https://www.Sendmail/registerlist.asp?s=[email or web address]&m=303&vs=5&p=2&ssglr=353&ps=1&group=38&it=1&nnp=265    Childbirth and Parenting Education:         Everyday Miracles:   https://www.everyday-miracles.org/    Free Video Series from [facility]: https://nursing.[facility]/academics/specialty-areas/nurse-midwifery/having-baby-prenatal-videos/having-baby-prenatal-and    MARINA parenting center: http://[facility] HospitalPerformance Horizon Group.[employer] Release [employer] Distribution Fund/   ([phone]  [place]: (education, yoga & wellness) www.MD.Voicea.[employer] Distribution Fund  Enlightened Mama: www.enlightenedmama.com   Childbirth collective: (Parent topic nights)  www.childbirthcollective.org/  Hypnobabies:  www.hypnobabiestwincities.com/  Hypnobirthing:  Http://hypnobirthing.com/  The Birth Hour: https://InsightsOnebirthhouAbacast/online-childbirth-class/    APPS and Podcasts:   Jossy Moss Nurture    Evidence Based Birth  The Birth Hour (for birth stories)   Birthful   Expectful   The Longest Shortest Time  PregnancyPodcast Yarely Nassar    Book Recommendations:   Elizabeth Hellen's Birthing From Within--first few chapters include a new-age tone, you may prefer to skip it and keep going, because there is good stuff later.  This book recommendation covers emotional preparation, but does cover coping with pain, and use of both pharmacological and nonpharmacological  "methods.    Dr. Hogan' The Pregnancy Book and The Birth Book--the pregnancy book goes month-by month      The Birth Partner by Loren Genao    Womanly Art of Breastfeeding by La Leche League International   Breastfeeding by Elisa Lu--great pictures    Mothering Your Nursing Toddler, by Mabel Talamantes.   Addresses dealing with so many of the challenging behaviors of a nursing toddler.  How Weaning Happens, by La Leche League.  Discusses weaning at all ages, from medically necessary weaning of an infant, all the way up to age 5 (or older), with why/why not, and strategies.  Very empowering book both for deciding to wean and deciding not to.    American College of Nurse-Midwives (ACNM) http://www.midwife.org/; look at the informational handouts at http://www.midwife.org/Share-With-Women     www.mymidwife.org    Mother to Baby (Medication and Herbal guidance in pregnancy): http://www.mothertobaby.org  Toll-Free Hotline: 299.416.1723  LactMed (Medication use while breastfeeding): http://toxnet.nlm.nih.gov/newtoxnet/lactmed.htm    Women's Health.gov:  http://www.womenshealth.gov/a-z-topics/index.html    American pregnancy association - http://americanpregnancy.org    Centering Pregnancy (group prenatal care option): http://centeringhealthcare.org    Information about doulas:  Childbirth collective: http://www.childbirthcollective.org/  Doulas of [place] (CHUCK):  www.chuck.org  [facility]  project: http://twincitiesdoulaproject.com/     Early Childhood and Family Education (ECFE):  ECFE offers parents hands-on learning experiences that will nourish a lifetime of teachable moments.  http://ecfe.info/ecfe-home/    March of Dimes www.Hearing Health Science.[employer]     FDA - Nutrition  www.mypyramid.gov  Under \"For Consumers,\" click on \"pregnant and breastfeeding women.\"      Centers for Disease Control and Prevention (CDC) - Vaccines : http://www.cdc.gov/vaccines/       When researching information on the web, " question the validity of websites.  The [employer] .gov, .edu and.org tend to be more reliable information.  If there are a lot of advertisements, be cautious of the information provided. Stay away from blogs and chat rooms please!     [place] Screening Program  Http://www.health.[facility].[place].us/newbornscreening/  [place] newborns are tested soon after birth for more than 50 hidden, rare disorders, including hearing loss and critical congenital heart disease (CCHD). This site provides resources and information for families and providers.    HEALTHY PREGNANCY CARE: 37 to 41 WEEKS PREGNANT    Talk with your midwife or physician about when to call with signs of labor    Regular uterine contractions that are getting closer together and/or stronger    If you think your water has broken or is leaking    Bleeding from the vagina like a period (bloody vaginal discharge is normal)    If you are not feeling your baby move    Make plans for transportation and  as needed for when you are going to the hospital.    Your midwife or physician may offer to check your cervix for changes.     Ask your health care provider about vaccinations you may need following delivery. By now, you should have received a Tdap immunization to protect against pertussis or whooping cough. Fathers and family members who will be in close contact with the baby should also receive a Tdap shot at least two weeks before the expected birth of the baby if they have not had a Td (tetanus) shot for at least two years.    If you are past your due date, discuss the next steps leading to delivery with your midwife or physician. If you don't start labor on your own by 41 or 42 weeks, your midwife or physician may recommend giving you medicines to ripen your cervix and start labor.  Induction of labor:  http://onlinelibrary.brody.com/store/10.1016/j.jmwh.2008.04.018/asset/j.jmwh.2008.04.018.pdf?v=1&t=epvl1yvg&i=[email or web address]    Preparing for your baby: Tell your midwife or physician how you plan to feed your baby (breast or bottle), who you have chosen to do pediatric care for your baby, and if you have a boy, whether you have chosen to have him circumcised. You will need a car seat correctly installed in your vehicle to bring your baby home. As you start to set up the nursery at home for your baby, make sure the crib is safe. The mattress needs to fit snugly against the edges of the crib. If you can fit a soda can between the bars, they are too far apart and can allow the baby's head to caught between them.    Learn about infant care and feeding, including information about infant CPR. We recommend that you put your baby to sleep on his or her back to reduce the chance of Sudden Infant Death Syndrome (SIDS). To maintain a healthy environment in which your child can grow, it's best to keep your home smoke-free. By preparing ahead, your transition into parenthood will go smoothly for you and your baby.    Your midwife or physician will want to see you for a checkup 2 to 6 weeks after delivery.    If you have questions about any symptoms you are experiencing or any other concerns, call your provider or their clinic staff at [facility] at Dept: [phone]. If it's after clinic hours, physician patients should call the Care Connection at [phone] ([phone]); midwife patients should call their answering service at [phone].    How can you care for yourself at home?   You can refer to the Starting Out Right book or find it online at http://www.[facility]altheast.org/images/stories/maternity/[facility]-Starting-Out-Right.pdf or http://www.[facility]altheast.org/images/stories/flipbooks/[facility]-starting-out-right/[facility]-starting-out-right.html#p=8     You can sign up  for a weekly parenting e-mail that gives support, tips and advice from health care professionals that starts with pregnancy and continues through the toddler years. To register, go to www.[facility].org/baby at any time during your pregnancy.        Making Plans for Feeding My Baby    By this point, you probably have read a lot about feeding your baby.  Breastfeed or formula? Each mother s decision is her own and [facility] respects you and your choices. We ve gathered information on both breastfeeding and formula feeding to help with your decision. Talking with your physician or nurse-midwife can also help in your decision.  However you plan to feed your baby, [facility] Maternity Care Centers encourage rooming in with your baby, skin-to-skin contact and feeding your baby based on his or her cues.    Skin-to-skin contact  Being close to mom helps your baby adjust to life outside of the womb.  It helps your baby regulate their body temperature, heart rate, and breathing.  Your baby will usually be placed skin-to-skin immediately following birth or as soon as possible, if medical intervention is needed.    Rooming-In  Having your baby stay with you in your room is called  rooming-in .  Keeping your baby in your room helps you to learn how to care for your baby by getting to know your baby s cues, body rhythms and sleep cycle.       Cue-based feeding  Cues (signals) are baby s way of telling you what he or she wants.  When you learn your infant s cues, you know how to care for and feed your baby.   Feeding cues are the licking and smacking of lips, bringing their fist to their mouth, and a reflex called  rooting - where baby turns and opens his or her mouth, searching for the breast or bottle.  Crying is a late feeding cue.  Babies can feed frequently, often at least 8 times in 24 hours.    Breastfeeding facts  Breast milk is the best source of nutrition for your baby and is available at birth. In the first couple of  days, your milk volume is already starting to increase, though it may not be noticeable. Breastfeed frequently to increase your milk supply. Within three to five days, you will begin to notice larger milk volumes. An increase in breast size, heaviness and firmness are often described as the milk  coming in.  Frequent breastfeeding can help breasts from getting overly firm and painful. You will know the baby is getting enough milk if your baby is having wet and dirty diapers and gaining weight.     If your goal is to exclusively breastfeed, it is important to not use any formula or artificial nipples (including bottles and pacifiers) while your baby is learning to breastfeed.  While it may seem like an  easy  option to give your baby a bottle, formula should only be given if there is a medical reason for your baby to have it.      Positioning and attachment   Get comfortable.  Use pillows as needed to support your arms and baby.  Hold baby close at the level of your breast, facing you in a tummy to tummy position.  Skin to skin helps with this.  Position the baby with his or her nose by the nipple.  There should be a straight line from baby s ear to shoulder to hips.  Tickle your baby s lips or wait for baby to open mouth wide, bring baby to breast by leading with the chin.  Aim the nipple at the roof of baby s mouth.  A rapid sucking pattern is followed by longer, drawing pattern with occasional swallows heard.  When baby is correctly latched, your nipple and much of the areola are pulled well into baby s mouth.      Returning to work or school  Focus on a good start to breastfeeding.  Many women continue to provide breastmilk for their baby when they return to work or school.  Making plans about where to pump and store milk can make the transition go well.  Talk with other mothers who have also returned to work or school for tips and support.  Your employer s Human Resource department may be a resource as well.      Returning to work or school: (continued)     babies can mean fewer  sick  days for you.    A quality breast pump will also save time and add comfort.  Check with your insurance prior to giving birth for breast pump coverage.  Many insurance companies include a pump within your benefits.    Wait until your baby is at least three weeks old to introduce a bottle for the first time.  Have someone besides you give the bottle.    Breastfeed when you are with your baby. Reserve your bottles of breast milk for when you are away.     Your breasts will need to be  emptied  either by your baby or a pump.  Plan to pump at least twice in an eight hour day.    If you cannot pump at work, continue breastfeeding at home. Any amount of breast milk is worth giving to your baby.    Formula feeding facts  If you are planning to use formula to feed your baby, you will want to make some preparations ahead of time. Talk to your doctor or nurse-midwife about what type of formula to use. Some are iron-fortified, meaning they have extra iron in them. You will want to purchase formula and bottles before your baby is born to be sure you are ready after you return from the hospital. [facility] do not provide formula samples to take home.    Be sure to follow formula mixing directions closely. Regular milk in the dairy case at the grocery store should not be given to babies under 1 year old. Baby formula is sold in several forms including:    Ready-to-use. This is the most expensive, but no mixing is necessary.    Concentrated liquid. This is less expensive than ready-to-use and you mix with water.    Powder. This is the least expensive. You mix one level scoop of powdered formula with two ounces of water and stir well.    Most babies need 2.5 ounces of formula per pound of body weight each day. This means an 8-pound baby may drink about 20 ounces of formula a day; however, this is just an estimate. The most important  thing is to pay attention to your baby s cues.  If your baby is always fussy, needs more iron or has certain food allergies, your physician may suggest you change your baby s formula to a different kind.     How do I warm my baby s bottles?  You may feed your baby a bottle without warming it first. It is OK for the breast milk or formula to be cool or room temperature. If your baby seems to prefer it warmed, you can put the filled bottle in a container of warm water and let it stand for a few minutes. Check the temperature of the liquid on your skin before feeding it to your baby; to be sure it isn t too hot. Do not heat bottles in the microwave. Microwaves heat food and liquids unevenly, and this can cause hot spots that can burn your baby.    How do I clean and sterilize bottles?  Sterilize bottles and nipples before you use them for the first time. You can do this by putting them in boiling water for 5 minutes. After that first time, you can wash them in hot and soapy water. Rinse them carefully to be sure there is no soap left on them. You can also wash them in the .    [facility] [phone] ([phone])    Baby Café    Pregnant and interested in breastfeeding?  Need answers to breastfeeding questions?  Want to help breastfeeding moms?  Already breastfeeding and want to meet other moms?    Join us at the Baby Café!    Baby Cafe is a free, drop-in service offering breast-feeding support for pregnant women, breast-feeding mothers and their families.  Come share tips and socialize with other mothers.  Babies and siblings are welcome (no childcare available).    Starting April 2018, Baby Café will be at 4 locations.  Please see below for the Baby Café closest to you!      [facility]  [address] [place] [address]  1st Wednesday: 10am-12pm    [facility]  [address] [place] [address]  3rd Wednesday 4-6pm    [facility]  [address] [place], [place]  "[id number]   10am-12:30pm    [facility] [facility]  [address] [place] [address]  : 4-6pm      Hmong, Serbian, and Citizen of [place] which is may be available at some sites.    For more information, please contact: Na [email or web address]. or [phone]      Virtual Breastfeeding Support:    During this time of isolation, breastfeeding families need even more community!  Here are some area organizations offering virtual support groups for breastfeeding:      [facility] Support Group,  at 10:30 am   Run by TABITHA Moreau of [employer]   Go to [employer] Facebook page and click on \"events\" for link   https://www.[employer].com/events/[email or web address]/    [facility] Milk Hour,  at 2:30 pm    Run by TABITHA Tam   Go to [facility] FB page and send message to get link   https://www.[employer].WiN MS/healthfoundations/    [facility] holding virtual meetings the first Tuesday of each month, 8-9 pm, and the   Third Saturday, 10 - 11 am.  Go to [facility] [place] FB page; message to get link https://www.[employer].WiN MS/LLLofGoldenValley/?hc_location=[facility]    [facility] offers a Lactation Lounge every Friday 12pm - 1pm, run by Heather [facility] Leader   Sign up via link at Fingerprint/cbe-lactation   https://www.Fingerprint/cbe-lactation    [facility] is offering virtual support groups every Monday, 10:30 am - 12 pm, run by nurse IBCLC   Https://www.facebook.com/events/193139485638549/    Prenatal Breastfeeding Classes:        [facility] is offering virtual breastfeeding and  care classes:  https://www.Fingerprint/education-workshops    BirthEd childbirth and breastfeeding education offering virtual prenatal breastfeeding " classes  https://www.[employer].com/workshops

## 2022-02-25 NOTE — PROGRESS NOTES
Here alone today for routine prenatal visit at 38w4d. Reports feeling well. Notes active FM and occasionally has BH contraction that is isolated, no regular UCs. Offers no questions or concerns today. Advised on upcoming visit schedule. Reviewed warning s/sx and reasons to call. RTC 1 week.

## 2022-03-04 ENCOUNTER — PRENATAL OFFICE VISIT (OUTPATIENT)
Dept: MIDWIFE SERVICES | Facility: CLINIC | Age: 32
End: 2022-03-04
Payer: COMMERCIAL

## 2022-03-04 VITALS
BODY MASS INDEX: 34.57 KG/M2 | HEART RATE: 72 BPM | HEIGHT: 64 IN | DIASTOLIC BLOOD PRESSURE: 78 MMHG | WEIGHT: 202.5 LBS | SYSTOLIC BLOOD PRESSURE: 126 MMHG

## 2022-03-04 DIAGNOSIS — Z34.03 ENCOUNTER FOR SUPERVISION OF NORMAL FIRST PREGNANCY IN THIRD TRIMESTER: Primary | ICD-10-CM

## 2022-03-04 PROCEDURE — 99207 PR PRENATAL VISIT: CPT | Performed by: ADVANCED PRACTICE MIDWIFE

## 2022-03-04 NOTE — PATIENT INSTRUCTIONS
"[facility] Nurse Midwives - Contact information:  Appointment line and to get a hold of CNM in clinic Monday-Friday 8 am - 5 pm:  [phone].  There are some clinics with early start times (1st appointment 7:40 am) and others with evening hours (last appointment 6:20 pm).  Most are typically open from 8 am to 5 pm.    CNM on call answering service: [phone].  Specify your hospital of choice and leave a brief message for CNM;  will then page CNM who is on call at your specified hospital and you should receive a call back with 15 minutes.  Be sure that your ringer is audible and that you can accept blocked calls so that we can get back in touch with you! This number should be reserved for urgent needs if during the day, before 8 am, after 5 pm, weekends, holidays.    Contact the on-call CNM with warning signs, such as:    vaginal bleeding     Vaginal discharge and itching or pain and burning during urination    Leg/calf pain or swelling on one side    severe abdominal pain    nausea and vomiting more than 4-5 times a day, or if you are unable to keep anything down    fever more than 100.4 degrees F.       Meet the Midwives from [facility]  You are invited to an informational meet and greet with [facility] Certified Nurse-Midwives. Our free \"Meet the Midwives\" event is a great opportunity to learn about our midwives' philosophy and experience, the hospitals where we can assist with your birth, and answer questions you may have. Partners, friends, and family are welcome to attend. Currently, this is a virtual event.  Dates: First Tuesday of every month at 7 pm.    Link to next (live) meeting  https://www.[place].org/classes-and-events/meet-the-midwives-from-[facility]-clinics  To Join by Telephone (audio only) Call:   [phone] Phone Conference ID: [id number]#      Touring the Maternity Care Center  At this time we are offering a virtual tour of the " Maternity Care Centers at both [facility] and [facility]:   [facility]: https://www.Many.org/Locations/[facility]/Maternity-Care-Center  [place]:   https://[facility].org/overarching-care/the-birthplace/tours  https://www.Many.org/Locations/[facility]/Maternity-Care-Center/#virtual_tour  When in person tours become available, registrations is required. To schedule a tour at either [place] or [facility], please do so online using the following links:  [facility] - https://www.StyleTech/registerlist.asp?s=6&m=303&vs=5&p=2&frcum=324&ps=1&group=37&it=1&uui=754  [facility] - https://www.StyleTech/registerlist.asp?s=6&m=303&vs=5&p=2&uoqvf=818&ps=1&group=38&it=1&ooz=498      Childbirth and Parenting Education:     Everyday Miracles:   https://www.everyday-miracles.org/    Free Video Series from [facility]: https://nursing.[facility]/academics/specialty-areas/nurse-midwifery/having-baby-prenatal-videos/having-baby-prenatal-and    MARINA parenting center: http://[facility] Hospital[facility]idamom.[facility]/   [phone]  [place]: (education, yoga & wellness) www.Strix Systemsa.[facility]  Enlightened Mama: www.enlightenedmama.com   Childbirth collective: (Parent topic nights)  www.childbirthcollective.org/  Hypnobabies:  www.hypnobabiestwincities.com/  Hypnobirthing:  Http://hypnobirthing.com/  The Birth Hour: https://[facility]/online-childbirth-class/    APPS and Podcasts:   Jossy Moss Nurture    Evidence Based Birth  The Birth Hour (for birth stories)   Birthful   Expectful   The Longest Shortest Time  PregnancyPodcast Yarely Nassar    Book Recommendations:   Elizabeth Hellne's Birthing From Within--first few chapters include a new-age tone, you may prefer to skip it and keep going, because there is good stuff later.  This book recommendation covers emotional preparation, but does cover coping with pain, and use of both pharmacological and nonpharmacological  "methods.    Dr. Hogan' The Pregnancy Book and The Birth Book--the pregnancy book goes month-by month      The Birth Partner by Loren Genao    Womanly Art of Breastfeeding by La Leche League International   Breastfeeding by Elisa uL--great pictures    Mothering Your Nursing Toddler, by Mabel Talamantes.   Addresses dealing with so many of the challenging behaviors of a nursing toddler.  How Weaning Happens, by La Leche League.  Discusses weaning at all ages, from medically necessary weaning of an infant, all the way up to age 5 (or older), with why/why not, and strategies.  Very empowering book both for deciding to wean and deciding not to.    American College of Nurse-Midwives (ACNM) http://www.midwife.org/; look at the informational handouts at http://www.midwife.org/Share-With-Women     www.mymidwife.org    Mother to Baby (Medication and Herbal guidance in pregnancy): http://www.mothertobaby.org  Toll-Free Hotline: 446.309.4527  LactMed (Medication use while breastfeeding): http://toxnet.nlm.nih.gov/newtoxnet/lactmed.htm    Women's Health.gov:  http://www.womenshealth.gov/a-z-topics/index.html    American pregnancy association - http://americanpregnancy.org    Centering Pregnancy (group prenatal care option): http://centeringhealthcare.org    Information about doulas:  Childbirth collective: http://www.childbirthcollective.org/  Doulas of [place] (CHUCK):  www.chuck.org  [facility]  project: http://twincitiesdoulaproject.com/     Early Childhood and Family Education (ECFE):  ECFE offers parents hands-on learning experiences that will nourish a lifetime of teachable moments.  http://ecfe.info/ecfe-home/    March of Dimes www.iDubba.Forensic Logic     FDA - Nutrition  www.mypyramid.gov  Under \"For Consumers,\" click on \"pregnant and breastfeeding women.\"      Centers for Disease Control and Prevention (CDC) - Vaccines : http://www.cdc.gov/vaccines/       When researching information on the web, " question the validity of websites.  The Cinedigm .gov, .edu and.org tend to be more reliable information.  If there are a lot of advertisements, be cautious of the information provided. Stay away from blogs and chat rooms please!     Making Plans for Feeding My Baby    By this point, you probably have read a lot about feeding your baby.  Breastfeed or formula? Each parent's decision is their own and [facility] Nurse [facility] respects you and your choices. We ve gathered information on both breastfeeding and formula feeding to help with your decision. Talking with your nurse-midwife can also help in your decision.  However you plan to feed your baby, [facility] encourage rooming in with your baby, skin-to-skin contact and feeding your baby based on his or her cues.    Skin-to-skin contact  Being close to mom helps your baby adjust to life outside of the womb.  It helps your baby regulate their body temperature, heart rate, and breathing.  Your baby will usually be placed skin-to-skin immediately following birth or as soon as possible, if medical intervention is needed.    Rooming-In  Having your baby stay with you in your room is called  rooming-in .  Keeping your baby in your room helps you to learn how to care for your baby by getting to know your baby s cues, body rhythms and sleep cycle.       Cue-based feeding  Cues (signals) are baby s way of telling you what he or she wants.  When you learn your infant s cues, you know how to care for and feed your baby.   Feeding cues are the licking and smacking of lips, bringing their fist to their mouth, and a reflex called  rooting - where baby turns and opens his or her mouth, searching for the breast or bottle.  Crying is a late feeding cue.  Babies can feed frequently, often at least 8 times in 24 hours.    Breastfeeding facts  Breast milk is the best source of nutrition for your baby and is available at birth. In the first  couple of days, your milk volume is already starting to increase, though it may not be noticeable. Breastfeed frequently to increase your milk supply. Within three to five days, you will begin to notice larger milk volumes. An increase in breast size, heaviness and firmness are often described as the milk  coming in.  Frequent breastfeeding can help breasts from getting overly firm and painful. You will know the baby is getting enough milk if your baby is having wet and dirty diapers and gaining weight.     If your goal is to exclusively breastfeed, it is important to not use any formula or artificial nipples (including bottles and pacifiers) while your baby is learning to breastfeed.  While it may seem like an  easy  option to give your baby a bottle, formula should only be given if there is a medical reason for your baby to have it.      Positioning and attachment   Get comfortable.  Use pillows as needed to support your arms and baby.  Hold baby close at the level of your breast, facing you in a tummy to tummy position.  Skin to skin helps with this.  Position the baby with his or her nose by the nipple.  There should be a straight line from baby s ear to shoulder to hips.  Tickle your baby s lips or wait for baby to open mouth wide, bring baby to breast by leading with the chin.  Aim the nipple at the roof of baby s mouth.  A rapid sucking pattern is followed by longer, drawing pattern with occasional swallows heard.  When baby is correctly latched, your nipple and much of the areola are pulled well into baby s mouth.      Returning to work or school  Focus on a good start to breastfeeding.  Many women continue to provide breastmilk for their baby when they return to work or school.  Making plans about where to pump and store milk can make the transition go well.  Talk with other mothers who have also returned to work or school for tips and support.  Your employer s Human Resource department may be a resource as  well.     Returning to work or school: (continued)     babies can mean fewer  sick  days for you.    A quality breast pump will also save time and add comfort.  Check with your insurance prior to giving birth for breast pump coverage.  Many insurance companies include a pump within your benefits.    Wait until your baby is at least three weeks old to introduce a bottle for the first time.  Have someone besides you give the bottle.    Breastfeed when you are with your baby. Reserve your bottles of breast milk for when you are away.     Your breasts will need to be  emptied  either by your baby or a pump.  Plan to pump at least twice in an eight hour day.    If you cannot pump at work, continue breastfeeding at home. Any amount of breast milk is worth giving to your baby.    Formula feeding facts  If you are planning to use formula to feed your baby, you will want to make some preparations ahead of time. Talk to your doctor or nurse-midwife about what type of formula to use. Some are iron-fortified, meaning they have extra iron in them. You will want to purchase formula and bottles before your baby is born to be sure you are ready after you return from the hospital. The [facility] do not provide formula samples to take home.    Be sure to follow formula mixing directions closely. Regular milk in the dairy case at the grocery store should not be given to babies under 1 year old. Baby formula is sold in several forms including:    Ready-to-use. This is the most expensive, but no mixing is necessary.    Concentrated liquid. This is less expensive than ready-to-use and you mix with water.    Powder. This is the least expensive. You mix one level scoop of powdered formula with two ounces of water and stir well.    Most babies need 2.5 ounces of formula per pound of body weight each day. This means an 8-pound baby may drink about 20 ounces of formula a day; however, this is just an estimate. The most  important thing is to pay attention to your baby s cues.  If your baby is always fussy, needs more iron or has certain food allergies, your physician may suggest you change your baby s formula to a different kind.     How do I warm my baby s bottles?  You may feed your baby a bottle without warming it first. It is OK for the breast milk or formula to be cool or room temperature. If your baby seems to prefer it warmed, you can put the filled bottle in a container of warm water and let it stand for a few minutes. Check the temperature of the liquid on your skin before feeding it to your baby; to be sure it isn t too hot. Do not heat bottles in the microwave. Microwaves heat food and liquids unevenly, and this can cause hot spots that can burn your baby.    How do I clean and sterilize bottles?  Sterilize bottles and nipples before you use them for the first time. You can do this by putting them in boiling water for 5 minutes. After that first time, you can wash them in hot and soapy water. Rinse them carefully to be sure there is no soap left on them. You can also wash them in the .    Breastfeeding/Chestfeeding Support  Contact us  Breastfeeding/chestfeeding is the natural and healthy way for parents to feed their babies and provides the best source of nutrition in most cases to infants. Breast milk has health benefits for mom, too. The American Academy of Pediatrics recommends exclusively breastfeeding for 6 months for optimal growth and development and breastfeeding up to at least a year.  Benefits to baby:    Easy digestion, less diarrhea and constipation, breast milk is easy to digest.    Lots of bonding with parent.    Less likely to have asthma.    Less ear infections and respiratory infections.    Less likely to have Type 2 Diabetes.    Less likely to become obese.    Lower risk of Sudden Infant Death Syndrome (SIDS).  Benefits to breastfeeding/chestfeeding parent:    Helps with weight loss.    Lower  risk of ovarian and breast cancer, Type 2 diabetes and heart disease.    /chestfed babies are easy to take on trips. Just grab the diapers and go!    Breastfeeding/chestfeeding saves money (no formula or bottle costs, fewer doctor bills and medication costs).    Ready to breastfeed/chestfeed anywhere, don t need to make and wash bottles.  Breastfeeding/chestfeeding is wonderful, but not always easy. Learning what to expect ahead of time and get support from a lactation professional. Check out the [facility] Breastfeeding Resource Guide for classes, drop in support groups, childbirth education, Doulas and clinic and hospital lactation services.     [facility] Baby Café  Due to COVID-19, all Baby Café sessions are canceled until further notice. For lactation support, please contact one of our bilingual staff:    Elida (IBCLC) [phone]    Na (IBCLC/ Bengali) [phone]    Tiarra (Hmong) [phone]    Ajay (Mosotho) [phone]  Baby Café is a free, drop-in service offering breastfeeding/chestfeeding support. Come share tips and socialize with other pregnant, breastfeeding/chestfeeding families. Babies and siblings are welcome (no  available).  We offer:    Professionally trained lactation staff.    Resource books for lending.    Relaxed and fun atmosphere.    Refreshments.  Locations  Baby Café is offered at several locations.  Please see below for the Baby Café closest to you.    CANCELED UNTIL FURTHER NOTICE  [facility]  [address], [address]  1st Wednesdays of the month   10 a.m. - Noon    CANCELED UNTIL FURTHER NOTICE  [facility]  [address], [place], [address]  4th Wednesdays of the month   10 a.m. - 12:30 p.m.       CANCELED UNTIL FURTHER NOTICE  [facility]  [address], [place], [address]  4th Wednesdays of the month  4 - 6 p.m.    CANCELED UNTIL FURTHER NOTICE  [facility]  [address] [place] [address]  "Avenue, [place], [facility]   of the month.  9:30-11:30 a.m.  Enter through the east end of the building, the blue Door C.  Ring the ECFE buzzer to be let in.   More information  Naselwyn Fang  [phone]  [email or web address].         Virtual Breastfeeding Support:    During this time of isolation, breastfeeding families need even more community!  Here are some area organizations offering virtual support groups for breastfeeding:      [facility] [employer] Support Group,  at 10:30 am   Run by TABITHA Moreau of [employer] [facility]   Go to [employer] [facility] Facebook page and click on \"events\" for link   [email or web address]://[email or web address]/    [facility] Milk Hour,  at 2:30 pm    Run by TABITHA Tam   Go to [facility] FB page and send message to get link   https://[email or web address].[employer].sones/healthfoundations/    [facility] holding virtual meetings the first Tuesday of each month, 8-9 pm, and the   Third Saturday, 10 - 11 am.  Go to [facility] FB page; message to get link [email or web address]://www.[employer].sones/LLLofGoldenValludy/?hc_location=[facility]    Teresa offers a Lactation Lounge every Friday 12pm - 1pm, run by Venita Lawson [facility] Leader   Sign up via link at "[employer]/cbe-lactation   https://www."[employer]/cbe-lactation    [facility] is offering virtual support groups every Monday, 10:30 am - 12 pm, run by nurse IBCLC   Https://www.facebook.com/events/[email or web address]/    Prenatal Breastfeeding Classes:        [facility]resa is offering virtual breastfeeding and  care classes:  https://www.MediaCore.sones/education-workshops    BirthEd childbirth and breastfeeding education offering virtual prenatal breastfeeding classes  Https://www.birthedmn.com/workshops      Preparing for your baby:    " Screening Program  Http://www.health.[facility].mn.us/newbornscreening/  [place] newborns are tested soon after birth for more than 50 hidden, rare disorders, including hearing loss and critical congenital heart disease (CCHD). This site provides resources and information for families and providers.    When to call:   Appointment line and to get a hold of CNM in clinic Monday-Friday 8 am - 5 pm:  [phone].  There are some clinics with early start times (1st appointment 7:40 am) and others with evening hours (last appointment 6:20 pm).  Most are typically open from 8 am to 5 pm.    CNM on call answering service: [phone].  Specify your hospital of choice and leave a brief message for CNM;  will then page CNM who is on call at your specified hospital and you should receive a call back with 15 minutes.  Be sure that your ringer is audible and that you can accept blocked calls so that we can get back in touch with you! This number should be reserved for urgent needs if during the day, before 8 am, after 5 pm, weekends, holidays.    Contact the on-call CNM with warning signs, such as:    vaginal bleeding     Vaginal discharge and itching or pain and burning during urination    Leg/calf pain or swelling on one side    severe abdominal pain    nausea and vomiting more than 4-5 times a day, or if you are unable to keep anything down    fever more than 100.4 degrees F.     Make plans for transportation and  as needed for when you are going to the hospital.    Ask your health care provider about vaccinations you may need following delivery. By now, you should have received a Tdap immunization to protect against pertussis or whooping cough. Fathers and family members who will be in close contact with the baby should also receive a Tdap shot at least two weeks before the expected birth of the baby if they have not had a Td (tetanus) shot for at least two years.    Your midwife may offer to check your  cervix for changes. If you are past your due date, discuss the next steps leading to delivery with your midwife. If you don't start labor on your own by 41 or 42 weeks, your midwife may recommend giving you medicines to ripen your cervix and start labor.  Induction of labor: http://onlinelibrary.brody.com/store/10.1016/j.jmwh.2008.04.018/asset/j.jmwh.2008.04.018.pdf?v=1&t=hhxi6qsj&u=[email or web address]    Tell your midwife or physician how you plan to feed your baby (breast or bottle), who you have chosen to do pediatric care for your baby, and if you have a boy, whether you have chosen to have him circumcised. You will need a car seat correctly installed in your vehicle to bring your baby home. As you start to set up the nursery at home for your baby, make sure the crib is safe. The mattress needs to fit snugly against the edges of the crib. If you can fit a soda can between the bars, they are too far apart and can allow the baby's head to caught between them.    Learn about infant care and feeding, including information about infant CPR. We recommend that you put your baby to sleep on his or her back to reduce the chance of Sudden Infant Death Syndrome (SIDS). To maintain a healthy environment in which your child can grow, it's best to keep your home smoke-free. By preparing ahead, your transition into parenthood will go smoothly for you and your baby.    Your midwife will want to see you for a checkup 2 to 6 weeks after delivery.

## 2022-03-04 NOTE — PROGRESS NOTES
Here alone today for routine prenatal visit at 39w4d. Reports feeling well. Notes active FM and has no regular UCs and did have one strong BH contraction. Wondering about visiting after baby's arrival; advised no specific recommendation except to consider their own personal boundaries regarding vaccination status, visitor exposure risks, and current community transmission rates, type of visit and riskier activities during the visit such as unmasked and eating gatherings. Advised on post-dates management options, desires expectant management but aware she may elect induction, advised on when we recommend induction at 42 weeks if no onset spontaneous labor prior, and reviewed upcoming visit schedule. Reviewed warning s/sx and reasons to call. RTC 1 week.

## 2022-03-11 ENCOUNTER — PRENATAL OFFICE VISIT (OUTPATIENT)
Dept: MIDWIFE SERVICES | Facility: CLINIC | Age: 32
End: 2022-03-11
Payer: COMMERCIAL

## 2022-03-11 VITALS
BODY MASS INDEX: 34.74 KG/M2 | WEIGHT: 203.5 LBS | HEIGHT: 64 IN | DIASTOLIC BLOOD PRESSURE: 70 MMHG | SYSTOLIC BLOOD PRESSURE: 112 MMHG | HEART RATE: 64 BPM

## 2022-03-11 DIAGNOSIS — Z34.03 ENCOUNTER FOR SUPERVISION OF NORMAL FIRST PREGNANCY IN THIRD TRIMESTER: Primary | ICD-10-CM

## 2022-03-11 DIAGNOSIS — O48.0 POST-TERM PREGNANCY, 40-42 WEEKS OF GESTATION: ICD-10-CM

## 2022-03-11 PROCEDURE — 99207 PR PRENATAL VISIT: CPT | Performed by: ADVANCED PRACTICE MIDWIFE

## 2022-03-11 NOTE — PROGRESS NOTES
Here alone today for routine prenatal visit at 40w4d. Reports feeling well. Notes active FM and no regular UCs. Offers no questions today. Content to await labor. Discussed post-dates management and recommendations for fetal monitoring; elects expectant management. Plan for BPP/NST on Monday with short interval return later in the week if no onset labor prior; orders completed. Reviewed warning s/sx and reasons to call. RTC Monday.

## 2022-03-11 NOTE — PATIENT INSTRUCTIONS
"[facility] Nurse Midwives - Contact information:  Appointment line and to get a hold of CNM in clinic Monday-Friday 8 am - 5 pm:  [phone].  There are some clinics with early start times (1st appointment 7:40 am) and others with evening hours (last appointment 6:20 pm).  Most are typically open from 8 am to 5 pm.    CNM on call answering service: [phone].  Specify your hospital of choice and leave a brief message for CNM;  will then page CNM who is on call at your specified hospital and you should receive a call back with 15 minutes.  Be sure that your ringer is audible and that you can accept blocked calls so that we can get back in touch with you! This number should be reserved for urgent needs if during the day, before 8 am, after 5 pm, weekends, holidays.    Contact the on-call CNM with warning signs, such as:    vaginal bleeding     Vaginal discharge and itching or pain and burning during urination    Leg/calf pain or swelling on one side    severe abdominal pain    nausea and vomiting more than 4-5 times a day, or if you are unable to keep anything down    fever more than 100.4 degrees F.       Meet the Midwives from [facility]  You are invited to an informational meet and greet with [facility] Certified Nurse-Midwives. Our free \"Meet the Midwives\" event is a great opportunity to learn about our midwives' philosophy and experience, the hospitals where we can assist with your birth, and answer questions you may have. Partners, friends, and family are welcome to attend. Currently, this is a virtual event.  Dates: First Tuesday of every month at 7 pm.    Link to next (live) meeting  [email or web address]://[email or web address].org/classes-and-events/meet-the-midwives-from-[facility]-clinics  To Join by Telephone (audio only) Call:   [phone] Phone Conference ID: [id number]#      Touring the Maternity Care Center  At this time we are offering a virtual tour of the " Maternity Care Centers at both [facility] and [facility]:   [facility]: [email or web address]://www.[place].org/Locations/[facility]/Maternity-Care-Center  [facility]:   https://[facility].org/overarching-care/the-birthplace/tours  https://www.[place].org/Locations/[facility]/Maternity-Care-Center/#virtual_tour  When in person tours become available, registrations is required. To schedule a tour at either [facility] or [facility], please do so online using the following links:  [facility] - [email or web address]&[email or web address]&[email or web address]&[email or web address]&[email or web address]&it=[email or web address]&bjk=[email or web address]  [facility] - [email or web address]://www.Dobango/registerlist.asp?s=[email or web address]&[email or web address]&vs=5&p=[email or web address]&zatpp=998&ps=1&group=38&it=1&jxi=828      Childbirth and Parenting Education:     Everyday Miracles:   https://www.everyday-miracles.org/    Free Video Series from [facility]: https://nursing.[facility]/academics/specialty-areas/nurse-midwifery/having-baby-prenatal-videos/having-baby-prenatal-and    MARINA parenting center: http://[facility] HospitalezNetPay.Business Combined/   [phone] 822-BABY  [place]: (education, yoga & wellness) www.Intelligent Fingerprintinga.Business Combined  Enlightened Mama: www.enlightenedmama.com   Childbirth collective: (Parent topic nights)  www.childbirthcollective.org/  Hypnobabies:  www.hypnobabiestwincities.com/  Hypnobirthing:  Http://hypnobirthing.com/  The Birth Hour: https://ViptablebirthhouJada Beauty/online-childbirth-class/    APPS and Podcasts:   Jossy Moss Nurture    Evidence Based Birth  The Birth Hour (for birth stories)   Birthful   Expectful   The Longest Shortest Time  PregnancyPodcast Yarely Nassar    Book Recommendations:   Elizabeth Hellen's Birthing From Within--first few chapters include a new-age tone, you may prefer to skip it and keep going, because there is good stuff later.  This book recommendation covers emotional preparation, but does cover coping with pain, and use of both pharmacological and nonpharmacological  "methods.    Dr. Hogan' The Pregnancy Book and The Birth Book--the pregnancy book goes month-by month      The Birth Partner by Loren Genao    Womanly Art of Breastfeeding by La Leche League International   Breastfeeding by Elisa Lu--great pictures    Mothering Your Nursing Toddler, by Mabel Talamantes.   Addresses dealing with so many of the challenging behaviors of a nursing toddler.  How Weaning Happens, by La Leche League.  Discusses weaning at all ages, from medically necessary weaning of an infant, all the way up to age 5 (or older), with why/why not, and strategies.  Very empowering book both for deciding to wean and deciding not to.    American College of Nurse-Midwives (ACNM) http://www.midwife.org/; look at the informational handouts at http://www.midwife.org/Share-With-Women     www.mymidwife.org    Mother to Baby (Medication and Herbal guidance in pregnancy): http://www.mothertobaby.org  Toll-Free Hotline: 922.311.8316  LactMed (Medication use while breastfeeding): http://toxnet.nlm.nih.gov/newtoxnet/lactmed.htm    Women's Health.gov:  http://www.womenshealth.gov/a-z-topics/index.html    American pregnancy association - http://americanpregnancy.org    Centering Pregnancy (group prenatal care option): http://centeringhealthcare.org    Information about doulas:  Childbirth collective: http://www.childbirthcollective.org/  Doulas of [place] (CHUCK):  www.chuck.org  [facility]  project: http://twincitiesdoulaproject.com/     Early Childhood and Family Education (ECFE):  ECFE offers parents hands-on learning experiences that will nourish a lifetime of teachable moments.  http://ecfe.info/ecfe-home/    March of Dimes www.O-RID.Presidio     FDA - Nutrition  www.mypyramid.gov  Under \"For Consumers,\" click on \"pregnant and breastfeeding women.\"      Centers for Disease Control and Prevention (CDC) - Vaccines : http://www.cdc.gov/vaccines/       When researching information on the web, " question the validity of websites.  The BiddingForGood .gov, .edu and.org tend to be more reliable information.  If there are a lot of advertisements, be cautious of the information provided. Stay away from blogs and chat rooms please!     Making Plans for Feeding My Baby    By this point, you probably have read a lot about feeding your baby.  Breastfeed or formula? Each parent's decision is their own and [facility] respects you and your choices. We ve gathered information on both breastfeeding and formula feeding to help with your decision. Talking with your nurse-midwife can also help in your decision.  However you plan to feed your baby, [facility] encourage rooming in with your baby, skin-to-skin contact and feeding your baby based on his or her cues.    Skin-to-skin contact  Being close to mom helps your baby adjust to life outside of the womb.  It helps your baby regulate their body temperature, heart rate, and breathing.  Your baby will usually be placed skin-to-skin immediately following birth or as soon as possible, if medical intervention is needed.    Rooming-In  Having your baby stay with you in your room is called  rooming-in .  Keeping your baby in your room helps you to learn how to care for your baby by getting to know your baby s cues, body rhythms and sleep cycle.       Cue-based feeding  Cues (signals) are baby s way of telling you what he or she wants.  When you learn your infant s cues, you know how to care for and feed your baby.   Feeding cues are the licking and smacking of lips, bringing their fist to their mouth, and a reflex called  rooting - where baby turns and opens his or her mouth, searching for the breast or bottle.  Crying is a late feeding cue.  Babies can feed frequently, often at least 8 times in 24 hours.    Breastfeeding facts  Breast milk is the best source of nutrition for your baby and is available at birth. In the first  couple of days, your milk volume is already starting to increase, though it may not be noticeable. Breastfeed frequently to increase your milk supply. Within three to five days, you will begin to notice larger milk volumes. An increase in breast size, heaviness and firmness are often described as the milk  coming in.  Frequent breastfeeding can help breasts from getting overly firm and painful. You will know the baby is getting enough milk if your baby is having wet and dirty diapers and gaining weight.     If your goal is to exclusively breastfeed, it is important to not use any formula or artificial nipples (including bottles and pacifiers) while your baby is learning to breastfeed.  While it may seem like an  easy  option to give your baby a bottle, formula should only be given if there is a medical reason for your baby to have it.      Positioning and attachment   Get comfortable.  Use pillows as needed to support your arms and baby.  Hold baby close at the level of your breast, facing you in a tummy to tummy position.  Skin to skin helps with this.  Position the baby with his or her nose by the nipple.  There should be a straight line from baby s ear to shoulder to hips.  Tickle your baby s lips or wait for baby to open mouth wide, bring baby to breast by leading with the chin.  Aim the nipple at the roof of baby s mouth.  A rapid sucking pattern is followed by longer, drawing pattern with occasional swallows heard.  When baby is correctly latched, your nipple and much of the areola are pulled well into baby s mouth.      Returning to work or school  Focus on a good start to breastfeeding.  Many women continue to provide breastmilk for their baby when they return to work or school.  Making plans about where to pump and store milk can make the transition go well.  Talk with other mothers who have also returned to work or school for tips and support.  Your employer s Human Resource department may be a resource as  well.     Returning to work or school: (continued)     babies can mean fewer  sick  days for you.    A quality breast pump will also save time and add comfort.  Check with your insurance prior to giving birth for breast pump coverage.  Many insurance companies include a pump within your benefits.    Wait until your baby is at least three weeks old to introduce a bottle for the first time.  Have someone besides you give the bottle.    Breastfeed when you are with your baby. Reserve your bottles of breast milk for when you are away.     Your breasts will need to be  emptied  either by your baby or a pump.  Plan to pump at least twice in an eight hour day.    If you cannot pump at work, continue breastfeeding at home. Any amount of breast milk is worth giving to your baby.    Formula feeding facts  If you are planning to use formula to feed your baby, you will want to make some preparations ahead of time. Talk to your doctor or nurse-midwife about what type of formula to use. Some are iron-fortified, meaning they have extra iron in them. You will want to purchase formula and bottles before your baby is born to be sure you are ready after you return from the hospital. The [facility] do not provide formula samples to take home.    Be sure to follow formula mixing directions closely. Regular milk in the dairy case at the grocery store should not be given to babies under 1 year old. Baby formula is sold in several forms including:    Ready-to-use. This is the most expensive, but no mixing is necessary.    Concentrated liquid. This is less expensive than ready-to-use and you mix with water.    Powder. This is the least expensive. You mix one level scoop of powdered formula with two ounces of water and stir well.    Most babies need 2.5 ounces of formula per pound of body weight each day. This means an 8-pound baby may drink about 20 ounces of formula a day; however, this is just an estimate. The most  important thing is to pay attention to your baby s cues.  If your baby is always fussy, needs more iron or has certain food allergies, your physician may suggest you change your baby s formula to a different kind.     How do I warm my baby s bottles?  You may feed your baby a bottle without warming it first. It is OK for the breast milk or formula to be cool or room temperature. If your baby seems to prefer it warmed, you can put the filled bottle in a container of warm water and let it stand for a few minutes. Check the temperature of the liquid on your skin before feeding it to your baby; to be sure it isn t too hot. Do not heat bottles in the microwave. Microwaves heat food and liquids unevenly, and this can cause hot spots that can burn your baby.    How do I clean and sterilize bottles?  Sterilize bottles and nipples before you use them for the first time. You can do this by putting them in boiling water for 5 minutes. After that first time, you can wash them in hot and soapy water. Rinse them carefully to be sure there is no soap left on them. You can also wash them in the .    Breastfeeding/Chestfeeding Support  Contact us  Breastfeeding/chestfeeding is the natural and healthy way for parents to feed their babies and provides the best source of nutrition in most cases to infants. Breast milk has health benefits for mom, too. The American Academy of Pediatrics recommends exclusively breastfeeding for 6 months for optimal growth and development and breastfeeding up to at least a year.  Benefits to baby:    Easy digestion, less diarrhea and constipation, breast milk is easy to digest.    Lots of bonding with parent.    Less likely to have asthma.    Less ear infections and respiratory infections.    Less likely to have Type 2 Diabetes.    Less likely to become obese.    Lower risk of Sudden Infant Death Syndrome (SIDS).  Benefits to breastfeeding/chestfeeding parent:    Helps with weight loss.    Lower  risk of ovarian and breast cancer, Type 2 diabetes and heart disease.    /chestfed babies are easy to take on trips. Just grab the diapers and go!    Breastfeeding/chestfeeding saves money (no formula or bottle costs, fewer doctor bills and medication costs).    Ready to breastfeed/chestfeed anywhere, don t need to make and wash bottles.  Breastfeeding/chestfeeding is wonderful, but not always easy. Learning what to expect ahead of time and get support from a lactation professional. Check out the [facility] Breastfeeding Resource Guide for classes, drop in support groups, childbirth education, Doulas and clinic and hospital lactation services.     [facility] Baby Café  Due to COVID-19, all Baby Café sessions are canceled until further notice. For lactation support, please contact one of our bilingual staff:    Elida (IBCLC) [phone]    Na (IBCLC/ Japanese) [phone]    Tiarra (Hmong) [phone]    Ajay (Kuwaiti) [phone]  Baby Café is a free, drop-in service offering breastfeeding/chestfeeding support. Come share tips and socialize with other pregnant, breastfeeding/chestfeeding families. Babies and siblings are welcome (no  available).  We offer:    Professionally trained lactation staff.    Resource books for lending.    Relaxed and fun atmosphere.    Refreshments.  Locations  Baby Café is offered at several locations.  Please see below for the Baby Café closest to you.    CANCELED UNTIL FURTHER NOTICE  [facility]  [address], [address]  1st Wednesdays of the month   10 a.m. - Noon    CANCELED UNTIL FURTHER NOTICE  [facility]  [address], [place], [address]  4th Wednesdays of the month   10 a.m. - 12:30 p.m.       CANCELED UNTIL FURTHER NOTICE  [facility]  [address], [place], [address]  4th Wednesdays of the month  4 - 6 p.m.    CANCELED UNTIL FURTHER NOTICE  [facility]  [address] [place] [address]  "Avenue, [place], [facility]   of the month.  9:30-11:30 a.m.  Enter through the east end of the building, the blue Door C.  Ring the ECFE buzzer to be let in.   More information  Naselwyn Fang  [phone]  [email or web address].         Virtual Breastfeeding Support:    During this time of isolation, breastfeeding families need even more community!  Here are some area organizations offering virtual support groups for breastfeeding:      [facility] Support Group,  at 10:30 am   Run by TABITHA Moreau of [employer] [facility] [facility]   Go to [employer] [facility] [employer] [facility] Facebook page and click on \"events\" for link   https://www.Covalent [email or web address]/[email or web address]/    [facility] Milk Hour,  at 2:30 pm    Run by TABITHA Tam   Go to [facility] FB page and send message to get link   https://www.Covalent Software.BrandMaker/healthfoundations/    [facility] holding virtual meetings the first Tuesday of each month, 8-9 pm, and the   Third Saturday, 10 - 11 am.  Go to [facility] FB page; message to get link [email or web address]://www.Covalent Software.BrandMaker/LLLofGoldenValludy/?hc_location=[facility]    Teresa offers a Lactation Lounge every Friday 12pm - 1pm, run by Venita Lawson [facility] Leader   Sign up via link at [employer]/cbe-lactation   https://www.[employer]/cbe-lactation    [facility] is offering virtual support groups every Monday, 10:30 am - 12 pm, run by nurse IBCLC   Https://www.facebook.com/events/[email or web address]/    Prenatal Breastfeeding Classes:        Teresa is offering virtual breastfeeding and  care classes:  https://www.Fanzo.BrandMaker/education-workshops    BirthEd childbirth and breastfeeding education offering virtual prenatal breastfeeding classes  Https://www.birthedmn.com/workshops      Preparing for your baby:    " Screening Program  Http://www.health.[facility].mn.us/newbornscreening/  [place] newborns are tested soon after birth for more than 50 hidden, rare disorders, including hearing loss and critical congenital heart disease (CCHD). This site provides resources and information for families and providers.    When to call:   Appointment line and to get a hold of CNM in clinic Monday-Friday 8 am - 5 pm:  [phone].  There are some clinics with early start times (1st appointment 7:40 am) and others with evening hours (last appointment 6:20 pm).  Most are typically open from 8 am to 5 pm.    CNM on call answering service: [phone].  Specify your hospital of choice and leave a brief message for CNM;  will then page CNM who is on call at your specified hospital and you should receive a call back with 15 minutes.  Be sure that your ringer is audible and that you can accept blocked calls so that we can get back in touch with you! This number should be reserved for urgent needs if during the day, before 8 am, after 5 pm, weekends, holidays.    Contact the on-call CNM with warning signs, such as:    vaginal bleeding     Vaginal discharge and itching or pain and burning during urination    Leg/calf pain or swelling on one side    severe abdominal pain    nausea and vomiting more than 4-5 times a day, or if you are unable to keep anything down    fever more than 100.4 degrees F.     Make plans for transportation and  as needed for when you are going to the hospital.    Ask your health care provider about vaccinations you may need following delivery. By now, you should have received a Tdap immunization to protect against pertussis or whooping cough. Fathers and family members who will be in close contact with the baby should also receive a Tdap shot at least two weeks before the expected birth of the baby if they have not had a Td (tetanus) shot for at least two years.    Your midwife may offer to check your  cervix for changes. If you are past your due date, discuss the next steps leading to delivery with your midwife. If you don't start labor on your own by 41 or 42 weeks, your midwife may recommend giving you medicines to ripen your cervix and start labor.  Induction of labor: http://onlinelibrary.brody.com/store/10.1016/j.jmwh.2008.04.018/asset/j.jmwh.2008.04.018.pdf?v=1&t=jsml5zqi&o=[email or web address]    Tell your midwife or physician how you plan to feed your baby (breast or bottle), who you have chosen to do pediatric care for your baby, and if you have a boy, whether you have chosen to have him circumcised. You will need a car seat correctly installed in your vehicle to bring your baby home. As you start to set up the nursery at home for your baby, make sure the crib is safe. The mattress needs to fit snugly against the edges of the crib. If you can fit a soda can between the bars, they are too far apart and can allow the baby's head to caught between them.    Learn about infant care and feeding, including information about infant CPR. We recommend that you put your baby to sleep on his or her back to reduce the chance of Sudden Infant Death Syndrome (SIDS). To maintain a healthy environment in which your child can grow, it's best to keep your home smoke-free. By preparing ahead, your transition into parenthood will go smoothly for you and your baby.    Your midwife will want to see you for a checkup 2 to 6 weeks after delivery.

## 2022-03-13 ENCOUNTER — TELEPHONE (OUTPATIENT)
Facility: CLINIC | Age: 32
End: 2022-03-13
Payer: COMMERCIAL

## 2022-03-13 ENCOUNTER — TELEPHONE (OUTPATIENT)
Dept: MIDWIFE SERVICES | Facility: CLINIC | Age: 32
End: 2022-03-13
Payer: COMMERCIAL

## 2022-03-13 NOTE — TELEPHONE ENCOUNTER
Melody is a 32 yr old  at 40w6d who calls with report of leaking fluid this afternoon. She feels like it started around 1540, clear fluid, no blood, no color,no odor. She has been feeling baby move normally. Irreg, mild contractions only. GBS negative. Advised to monitor for signs of active labor. Expectant management at home appropriate if she desires. Advised to rest or walk as desired, stay hydrated and eat if desires. Will call back to check in with BR[facility] tonight if no active labor by that time, or if she is feeling unwell, develops a fever, has bloody or colored fluid with an odor.

## 2022-03-14 ENCOUNTER — TELEPHONE (OUTPATIENT)
Dept: MIDWIFE SERVICES | Facility: CLINIC | Age: 32
End: 2022-03-14

## 2022-03-14 NOTE — TELEPHONE ENCOUNTER
TC: Spoke with Melody this evening following reporting clear fluid soaking her underwear, > amount she usually experiences with discharge, fluid like rather than tacky at 1540. She reports no further episodes in past 4 hours despite a mile long walk and being up and active. She is not wearing a pad and has not needed to change her clothing since that initial episode. She is experiencing mild contractions she describes as similar to yesterday afternoon through until bedtime and stopped overnight. They do not stop her from her normal activities. She is feeling normal fetal movement this evening, picked up after her walk. Discussed options available including expectant management at home or evaluation at [facility] [place] to confirm whether she is leaking fluid but did advise may have a negative swab result if she is not continuously leaking fluid. Reviewed [facility] standard of care to evaluate by 12 hours after suspected ROM. Declined evaluation this evening; based on timing, labor status, and no further episodes of leaking would be reasonable to get full night rest and be evaluated in the morning. Desires  expectant management at this time and contracted to call at 0700 to establish a plan if not in labor by that time. Reviewed warning s/sx and reasons to call including further episodes of leaking with change in fluid color, odor, fetal movement decreased, or difficulty coping with contractions/active labor. Agrees with POC. Encouraged both rest and activity as tolerated, eat as able. Agrees with POC. Will call by 0700. Message with details sent via [employer] and copied to this phone note for 3/14/22 haroldo JARQUIN. Clinic appointments are available for tomorrow morning at 0820-900 or afternoon as needed.

## 2022-03-14 NOTE — TELEPHONE ENCOUNTER
"PHONE NOTE: Pt calls CNM to report in as directed. Apologized for calling late \"I guess I slept through my 7 am alarm.\" Nothing new to report. No further leaking since the one episode yesterday at 1540. No bloody show. No increase in UC's or cramping. Baby active. Pt wishes to stay home for now and keep her appts for tomorrow--both US and CNM visit. Pt offered clinic visit today but she declines unless something changes. Recommended having a low threshold for a call to the CNM's. Pt is GBS neg.  Reviewed early labor instructions, just in case. Reviewed s/s to watch for and pt knows to call in with any fever, leaking, bleeding/bloody show, change in FM, or labor. Pt has phone numbers and agrees to plan.  "

## 2022-03-15 ENCOUNTER — HOSPITAL ENCOUNTER (INPATIENT)
Facility: CLINIC | Age: 32
LOS: 3 days | Discharge: HOME | End: 2022-03-18
Attending: ADVANCED PRACTICE MIDWIFE | Admitting: ADVANCED PRACTICE MIDWIFE
Payer: COMMERCIAL

## 2022-03-15 ENCOUNTER — PRENATAL OFFICE VISIT (OUTPATIENT)
Dept: MIDWIFE SERVICES | Facility: CLINIC | Age: 32
End: 2022-03-15
Payer: COMMERCIAL

## 2022-03-15 ENCOUNTER — HOSPITAL ENCOUNTER (OUTPATIENT)
Dept: ULTRASOUND IMAGING | Facility: CLINIC | Age: 32
Discharge: HOME | End: 2022-03-15
Attending: ADVANCED PRACTICE MIDWIFE | Admitting: ADVANCED PRACTICE MIDWIFE
Payer: COMMERCIAL

## 2022-03-15 VITALS
HEIGHT: 64 IN | SYSTOLIC BLOOD PRESSURE: 116 MMHG | DIASTOLIC BLOOD PRESSURE: 66 MMHG | HEART RATE: 64 BPM | WEIGHT: 204.2 LBS | BODY MASS INDEX: 34.86 KG/M2

## 2022-03-15 DIAGNOSIS — O48.0 41 WEEKS GESTATION OF PREGNANCY: ICD-10-CM

## 2022-03-15 DIAGNOSIS — Z3A.41 41 WEEKS GESTATION OF PREGNANCY: ICD-10-CM

## 2022-03-15 DIAGNOSIS — Z98.891 S/P CESAREAN SECTION: Primary | ICD-10-CM

## 2022-03-15 DIAGNOSIS — Z34.03 ENCOUNTER FOR SUPERVISION OF NORMAL FIRST PREGNANCY IN THIRD TRIMESTER: Primary | ICD-10-CM

## 2022-03-15 DIAGNOSIS — O48.0 POST-TERM PREGNANCY, 40-42 WEEKS OF GESTATION: ICD-10-CM

## 2022-03-15 DIAGNOSIS — O36.8390 VARIABLE FETAL HEART RATE DECELERATIONS, ANTEPARTUM: ICD-10-CM

## 2022-03-15 DIAGNOSIS — Z34.03 ENCOUNTER FOR SUPERVISION OF NORMAL FIRST PREGNANCY IN THIRD TRIMESTER: ICD-10-CM

## 2022-03-15 PROBLEM — Z36.89 ENCOUNTER FOR TRIAGE IN PREGNANT PATIENT: Status: ACTIVE | Noted: 2022-03-15

## 2022-03-15 LAB
ABO/RH(D): NORMAL
ANTIBODY SCREEN: NEGATIVE
BASOPHILS # BLD AUTO: 0 10E3/UL (ref 0–0.2)
BASOPHILS NFR BLD AUTO: 0 %
EOSINOPHIL # BLD AUTO: 0.1 10E3/UL (ref 0–0.7)
EOSINOPHIL NFR BLD AUTO: 1 %
ERYTHROCYTE [DISTWIDTH] IN BLOOD BY AUTOMATED COUNT: 14.8 % (ref 10–15)
HCT VFR BLD AUTO: 34.8 % (ref 35–47)
HGB BLD-MCNC: 11.5 G/DL (ref 11.7–15.7)
IMM GRANULOCYTES # BLD: 0.1 10E3/UL
IMM GRANULOCYTES NFR BLD: 1 %
LYMPHOCYTES # BLD AUTO: 1.7 10E3/UL (ref 0.8–5.3)
LYMPHOCYTES NFR BLD AUTO: 18 %
MCH RBC QN AUTO: 29 PG (ref 26.5–33)
MCHC RBC AUTO-ENTMCNC: 33 G/DL (ref 31.5–36.5)
MCV RBC AUTO: 88 FL (ref 78–100)
MONOCYTES # BLD AUTO: 0.6 10E3/UL (ref 0–1.3)
MONOCYTES NFR BLD AUTO: 6 %
NEUTROPHILS # BLD AUTO: 6.8 10E3/UL (ref 1.6–8.3)
NEUTROPHILS NFR BLD AUTO: 74 %
NRBC # BLD AUTO: 0 10E3/UL
NRBC BLD AUTO-RTO: 0 /100
PLATELET # BLD AUTO: 232 10E3/UL (ref 150–450)
RBC # BLD AUTO: 3.97 10E6/UL (ref 3.8–5.2)
SARS-COV-2 RNA RESP QL NAA+PROBE: NEGATIVE
SPECIMEN EXPIRATION DATE: NORMAL
WBC # BLD AUTO: 9.2 10E3/UL (ref 4–11)

## 2022-03-15 PROCEDURE — 87635 SARS-COV-2 COVID-19 AMP PRB: CPT | Performed by: ADVANCED PRACTICE MIDWIFE

## 2022-03-15 PROCEDURE — 99223 1ST HOSP IP/OBS HIGH 75: CPT | Performed by: ADVANCED PRACTICE MIDWIFE

## 2022-03-15 PROCEDURE — 258N000003 HC RX IP 258 OP 636: Performed by: MIDWIFE

## 2022-03-15 PROCEDURE — 36415 COLL VENOUS BLD VENIPUNCTURE: CPT | Performed by: ADVANCED PRACTICE MIDWIFE

## 2022-03-15 PROCEDURE — 99207 PR PRENATAL VISIT: CPT | Performed by: ADVANCED PRACTICE MIDWIFE

## 2022-03-15 PROCEDURE — 85025 COMPLETE CBC W/AUTO DIFF WBC: CPT | Performed by: ADVANCED PRACTICE MIDWIFE

## 2022-03-15 PROCEDURE — C9803 HOPD COVID-19 SPEC COLLECT: HCPCS

## 2022-03-15 PROCEDURE — 76819 FETAL BIOPHYS PROFIL W/O NST: CPT

## 2022-03-15 PROCEDURE — 59426 ANTEPARTUM CARE ONLY: CPT | Performed by: ADVANCED PRACTICE MIDWIFE

## 2022-03-15 PROCEDURE — 86850 RBC ANTIBODY SCREEN: CPT | Performed by: ADVANCED PRACTICE MIDWIFE

## 2022-03-15 PROCEDURE — 250N000009 HC RX 250: Performed by: ADVANCED PRACTICE MIDWIFE

## 2022-03-15 PROCEDURE — 59025 FETAL NON-STRESS TEST: CPT | Performed by: ADVANCED PRACTICE MIDWIFE

## 2022-03-15 PROCEDURE — 258N000003 HC RX IP 258 OP 636: Performed by: ADVANCED PRACTICE MIDWIFE

## 2022-03-15 PROCEDURE — 120N000001 HC R&B MED SURG/OB

## 2022-03-15 RX ORDER — ONDANSETRON 2 MG/ML
4 INJECTION INTRAMUSCULAR; INTRAVENOUS EVERY 6 HOURS PRN
Status: DISCONTINUED | OUTPATIENT
Start: 2022-03-15 | End: 2022-03-16 | Stop reason: HOSPADM

## 2022-03-15 RX ORDER — MISOPROSTOL 200 UG/1
800 TABLET ORAL
Status: DISCONTINUED | OUTPATIENT
Start: 2022-03-15 | End: 2022-03-16 | Stop reason: HOSPADM

## 2022-03-15 RX ORDER — ONDANSETRON 4 MG/1
4 TABLET, ORALLY DISINTEGRATING ORAL EVERY 6 HOURS PRN
Status: DISCONTINUED | OUTPATIENT
Start: 2022-03-15 | End: 2022-03-16 | Stop reason: HOSPADM

## 2022-03-15 RX ORDER — NALOXONE HYDROCHLORIDE 0.4 MG/ML
0.4 INJECTION, SOLUTION INTRAMUSCULAR; INTRAVENOUS; SUBCUTANEOUS
Status: DISCONTINUED | OUTPATIENT
Start: 2022-03-15 | End: 2022-03-16 | Stop reason: HOSPADM

## 2022-03-15 RX ORDER — OXYTOCIN/0.9 % SODIUM CHLORIDE 30/500 ML
340 PLASTIC BAG, INJECTION (ML) INTRAVENOUS CONTINUOUS PRN
Status: DISCONTINUED | OUTPATIENT
Start: 2022-03-15 | End: 2022-03-16 | Stop reason: HOSPADM

## 2022-03-15 RX ORDER — PROCHLORPERAZINE MALEATE 10 MG
10 TABLET ORAL EVERY 6 HOURS PRN
Status: DISCONTINUED | OUTPATIENT
Start: 2022-03-15 | End: 2022-03-16 | Stop reason: HOSPADM

## 2022-03-15 RX ORDER — MISOPROSTOL 200 UG/1
400 TABLET ORAL
Status: DISCONTINUED | OUTPATIENT
Start: 2022-03-15 | End: 2022-03-16 | Stop reason: HOSPADM

## 2022-03-15 RX ORDER — CARBOPROST TROMETHAMINE 250 UG/ML
250 INJECTION, SOLUTION INTRAMUSCULAR
Status: DISCONTINUED | OUTPATIENT
Start: 2022-03-15 | End: 2022-03-16 | Stop reason: HOSPADM

## 2022-03-15 RX ORDER — OXYTOCIN/0.9 % SODIUM CHLORIDE 30/500 ML
0.5 PLASTIC BAG, INJECTION (ML) INTRAVENOUS CONTINUOUS
Status: DISCONTINUED | OUTPATIENT
Start: 2022-03-15 | End: 2022-03-16 | Stop reason: HOSPADM

## 2022-03-15 RX ORDER — LIDOCAINE 40 MG/G
CREAM TOPICAL
Status: DISCONTINUED | OUTPATIENT
Start: 2022-03-15 | End: 2022-03-15 | Stop reason: HOSPADM

## 2022-03-15 RX ORDER — KETOROLAC TROMETHAMINE 30 MG/ML
30 INJECTION, SOLUTION INTRAMUSCULAR; INTRAVENOUS
Status: DISCONTINUED | OUTPATIENT
Start: 2022-03-15 | End: 2022-03-18 | Stop reason: HOSPADM

## 2022-03-15 RX ORDER — NALOXONE HYDROCHLORIDE 0.4 MG/ML
0.2 INJECTION, SOLUTION INTRAMUSCULAR; INTRAVENOUS; SUBCUTANEOUS
Status: DISCONTINUED | OUTPATIENT
Start: 2022-03-15 | End: 2022-03-16 | Stop reason: HOSPADM

## 2022-03-15 RX ORDER — OXYTOCIN 10 [USP'U]/ML
10 INJECTION, SOLUTION INTRAMUSCULAR; INTRAVENOUS
Status: DISCONTINUED | OUTPATIENT
Start: 2022-03-15 | End: 2022-03-16 | Stop reason: HOSPADM

## 2022-03-15 RX ORDER — HYDROXYZINE HYDROCHLORIDE 25 MG/1
50 TABLET, FILM COATED ORAL
Status: DISCONTINUED | OUTPATIENT
Start: 2022-03-15 | End: 2022-03-16 | Stop reason: HOSPADM

## 2022-03-15 RX ORDER — METHYLERGONOVINE MALEATE 0.2 MG/ML
200 INJECTION INTRAVENOUS
Status: DISCONTINUED | OUTPATIENT
Start: 2022-03-15 | End: 2022-03-16 | Stop reason: HOSPADM

## 2022-03-15 RX ORDER — OXYTOCIN/0.9 % SODIUM CHLORIDE 30/500 ML
100-340 PLASTIC BAG, INJECTION (ML) INTRAVENOUS CONTINUOUS PRN
Status: DISCONTINUED | OUTPATIENT
Start: 2022-03-15 | End: 2022-03-18 | Stop reason: HOSPADM

## 2022-03-15 RX ORDER — METOCLOPRAMIDE 10 MG/1
10 TABLET ORAL EVERY 6 HOURS PRN
Status: DISCONTINUED | OUTPATIENT
Start: 2022-03-15 | End: 2022-03-16 | Stop reason: HOSPADM

## 2022-03-15 RX ORDER — METOCLOPRAMIDE HYDROCHLORIDE 5 MG/ML
10 INJECTION INTRAMUSCULAR; INTRAVENOUS EVERY 6 HOURS PRN
Status: DISCONTINUED | OUTPATIENT
Start: 2022-03-15 | End: 2022-03-16 | Stop reason: HOSPADM

## 2022-03-15 RX ORDER — OXYTOCIN 10 [USP'U]/ML
10 INJECTION, SOLUTION INTRAMUSCULAR; INTRAVENOUS
Status: DISCONTINUED | OUTPATIENT
Start: 2022-03-15 | End: 2022-03-18 | Stop reason: HOSPADM

## 2022-03-15 RX ORDER — IBUPROFEN 600 MG/1
600 TABLET, FILM COATED ORAL
Status: DISCONTINUED | OUTPATIENT
Start: 2022-03-15 | End: 2022-03-18 | Stop reason: HOSPADM

## 2022-03-15 RX ORDER — SODIUM CHLORIDE, SODIUM LACTATE, POTASSIUM CHLORIDE, CALCIUM CHLORIDE 600; 310; 30; 20 MG/100ML; MG/100ML; MG/100ML; MG/100ML
INJECTION, SOLUTION INTRAVENOUS CONTINUOUS
Status: DISCONTINUED | OUTPATIENT
Start: 2022-03-15 | End: 2022-03-16 | Stop reason: HOSPADM

## 2022-03-15 RX ORDER — PROCHLORPERAZINE 25 MG
25 SUPPOSITORY, RECTAL RECTAL EVERY 12 HOURS PRN
Status: DISCONTINUED | OUTPATIENT
Start: 2022-03-15 | End: 2022-03-16 | Stop reason: HOSPADM

## 2022-03-15 RX ADMIN — Medication 1 MILLI-UNITS/MIN: at 19:37

## 2022-03-15 RX ADMIN — SODIUM CHLORIDE, POTASSIUM CHLORIDE, SODIUM LACTATE AND CALCIUM CHLORIDE 1000 ML: 600; 310; 30; 20 INJECTION, SOLUTION INTRAVENOUS at 18:45

## 2022-03-15 RX ADMIN — SODIUM CHLORIDE, POTASSIUM CHLORIDE, SODIUM LACTATE AND CALCIUM CHLORIDE 1000 ML: 600; 310; 30; 20 INJECTION, SOLUTION INTRAVENOUS at 17:35

## 2022-03-15 ASSESSMENT — ACTIVITIES OF DAILY LIVING (ADL)
DIFFICULTY_COMMUNICATING: NO
DIFFICULTY_EATING/SWALLOWING: NO
CHANGE_IN_FUNCTIONAL_STATUS_SINCE_ONSET_OF_CURRENT_ILLNESS/INJURY: NO
DOING_ERRANDS_INDEPENDENTLY_DIFFICULTY: NO
HEARING_DIFFICULTY_OR_DEAF: NO
CONCENTRATING,_REMEMBERING_OR_MAKING_DECISIONS_DIFFICULTY: NO
DRESSING/BATHING_DIFFICULTY: NO
WEAR_GLASSES_OR_BLIND: NO
TOILETING_ISSUES: NO
WALKING_OR_CLIMBING_STAIRS_DIFFICULTY: NO
FALL_HISTORY_WITHIN_LAST_SIX_MONTHS: NO

## 2022-03-15 NOTE — PATIENT INSTRUCTIONS
"[facility] - Contact information:  Appointment line and to get a hold of CNM in clinic Monday-Friday 8 am - 5 pm:  [phone].  There are some clinics with early start times (1st appointment 7:40 am) and others with evening hours (last appointment 6:20 pm).  Most are typically open from 8 am to 5 pm.    CNM on call answering service: [phone].  Specify your hospital of choice and leave a brief message for CNM;  will then page CNM who is on call at your specified hospital and you should receive a call back with 15 minutes.  Be sure that your ringer is audible and that you can accept blocked calls so that we can get back in touch with you! This number should be reserved for urgent needs if during the day, before 8 am, after 5 pm, weekends, holidays.    Contact the on-call CNM with warning signs, such as:    vaginal bleeding     Vaginal discharge and itching or pain and burning during urination    Leg/calf pain or swelling on one side    severe abdominal pain    nausea and vomiting more than 4-5 times a day, or if you are unable to keep anything down    fever more than 100.4 degrees F.       Meet the Midwives from [facility]  You are invited to an informational meet and greet with [facility] Certified Nurse-Midwives. Our free \"Meet the Midwives\" event is a great opportunity to learn about our midwives' philosophy and experience, the hospitals where we can assist with your birth, and answer questions you may have. Partners, friends, and family are welcome to attend. Currently, this is a virtual event.  Dates: First Tuesday of every month at 7 pm.    Link to next (live) meeting  [email or web address]/classes-and-[email or web address]/meet-the-midwives-from-[facility]-clinics  To Join by Telephone (audio only) Call:   [phone] Phone Conference ID: [id number]      Touring the Maternity Care Center  At this time we are offering a virtual tour of the " Maternity Care Centers at both [facility] and [facility]:   [facility]: https://www.[place].org/Locations/[facility]/Maternity-Care-Center  [place]:   https://[facility].org/overarching-care/the-birthplace/tours  https://www.[place].org/Locations/[facility]/Maternity-Care-Center/#virtual_tour  When in person tours become available, registrations is required. To schedule a tour at either [place] or [facility], please do so online using the following links:  [facility] - [email or web address]://www.iMove/registerlist.asp?s=[email or web address]&m=303&vs=5&p=2&mgizf=849&ps=1&group=37&it=1&zsg=085  [facility] - https://www.iMove/registerlist.asp?s=6&m=303&vs=5&p=2&cqczx=321&ps=1&group=38&it=1&vzy=377      Childbirth and Parenting Education:     Everyday Miracles:   https://www.everyday-miracles.org/    Free Video Series from [facility]: https://nursing.[facility]/academics/specialty-areas/nurse-midwifery/having-baby-prenatal-videos/having-baby-prenatal-and    MARINA parenting center: http://[facility].Keen Guides/   [phone] [phone]  [place]: (education, yoga & wellness) www.Friendshippra.Keen Guides  Enlightened Mama: www.enlightenedmama.com   Childbirth collective: (Parent topic nights)  www.childbirthcollective.org/  Hypnobabies:  www.hypnobabiestwincities.com/  Hypnobirthing:  Http://hypnobirthing.com/  The Birth Hour: https://Acendi InteractivebirthhouRed Tricycle/online-childbirth-class/    APPS and Podcasts:   Jossy Moss Nurture    Evidence Based Birth  The Birth Hour (for birth stories)   Birthful   Expectful   The Longest Shortest Time  PregnancyPodcast Yarely Nassar    Book Recommendations:   Elizabeth Hellen's Birthing From Within--first few chapters include a new-age tone, you may prefer to skip it and keep going, because there is good stuff later.  This book recommendation covers emotional preparation, but does cover coping with pain, and use of both pharmacological and nonpharmacological  "methods.    Dr. Hogan' The Pregnancy Book and The Birth Book--the pregnancy book goes month-by month      The Birth Partner by Loren Genao    Womanly Art of Breastfeeding by La Leche League International   Breastfeeding by Elisa Lu--great pictures    Mothering Your Nursing Toddler, by Mabel Talamantes.   Addresses dealing with so many of the challenging behaviors of a nursing toddler.  How Weaning Happens, by La Leche League.  Discusses weaning at all ages, from medically necessary weaning of an infant, all the way up to age 5 (or older), with why/why not, and strategies.  Very empowering book both for deciding to wean and deciding not to.    American College of Nurse-Midwives (ACNM) http://www.midwife.org/; look at the informational handouts at http://www.midwife.org/Share-With-Women     www.mymidwife.org    Mother to Baby (Medication and Herbal guidance in pregnancy): http://www.mothertobaby.org  Toll-Free Hotline: 893.235.9133  LactMed (Medication use while breastfeeding): http://toxnet.nlm.nih.gov/newtoxnet/lactmed.htm    Women's Health.gov:  http://www.womenshealth.gov/a-z-topics/index.html    American pregnancy association - http://americanpregnancy.org    Centering Pregnancy (group prenatal care option): http://centeringhealthcare.org    Information about doulas:  Childbirth collective: http://www.childbirthcollective.org/  Doulas of [place] (CHUCK):  www.chuck.org  [facility]  project: http://twincitiesdoulaproject.com/     Early Childhood and Family Education (ECFE):  ECFE offers parents hands-on learning experiences that will nourish a lifetime of teachable moments.  http://ecfe.info/ecfe-home/    March of Dimes www.Hearing Health Science.Zoeticx     FDA - Nutrition  www.mypyramid.gov  Under \"For Consumers,\" click on \"pregnant and breastfeeding women.\"      Centers for Disease Control and Prevention (CDC) - Vaccines : http://www.cdc.gov/vaccines/       When researching information on the web, " question the validity of websites.  [employer] [place] .gov, .edu and.org tend to be more reliable information.  If there are a lot of advertisements, be cautious of the information provided. Stay away from blogs and chat rooms please!     Making Plans for Feeding My Baby    By this point, you probably have read a lot about feeding your baby.  Breastfeed or formula? Each parent's decision is their own and [facility] respects you and your choices. We ve gathered information on both breastfeeding and formula feeding to help with your decision. Talking with your nurse-midwife can also help in your decision.  However you plan to feed your baby, [facility] encourage rooming in with your baby, skin-to-skin contact and feeding your baby based on his or her cues.    Skin-to-skin contact  Being close to mom helps your baby adjust to life outside of the womb.  It helps your baby regulate their body temperature, heart rate, and breathing.  Your baby will usually be placed skin-to-skin immediately following birth or as soon as possible, if medical intervention is needed.    Rooming-In  Having your baby stay with you in your room is called  rooming-in .  Keeping your baby in your room helps you to learn how to care for your baby by getting to know your baby s cues, body rhythms and sleep cycle.       Cue-based feeding  Cues (signals) are baby s way of telling you what he or she wants.  When you learn your infant s cues, you know how to care for and feed your baby.   Feeding cues are the licking and smacking of lips, bringing their fist to their mouth, and a reflex called  rooting - where baby turns and opens his or her mouth, searching for the breast or bottle.  Crying is a late feeding cue.  Babies can feed frequently, often at least 8 times in 24 hours.    Breastfeeding facts  Breast milk is the best source of nutrition for your baby and is available at birth. In the first  couple of days, your milk volume is already starting to increase, though it may not be noticeable. Breastfeed frequently to increase your milk supply. Within three to five days, you will begin to notice larger milk volumes. An increase in breast size, heaviness and firmness are often described as the milk  coming in.  Frequent breastfeeding can help breasts from getting overly firm and painful. You will know the baby is getting enough milk if your baby is having wet and dirty diapers and gaining weight.     If your goal is to exclusively breastfeed, it is important to not use any formula or artificial nipples (including bottles and pacifiers) while your baby is learning to breastfeed.  While it may seem like an  easy  option to give your baby a bottle, formula should only be given if there is a medical reason for your baby to have it.      Positioning and attachment   Get comfortable.  Use pillows as needed to support your arms and baby.  Hold baby close at the level of your breast, facing you in a tummy to tummy position.  Skin to skin helps with this.  Position the baby with his or her nose by the nipple.  There should be a straight line from baby s ear to shoulder to hips.  Tickle your baby s lips or wait for baby to open mouth wide, bring baby to breast by leading with the chin.  Aim the nipple at the roof of baby s mouth.  A rapid sucking pattern is followed by longer, drawing pattern with occasional swallows heard.  When baby is correctly latched, your nipple and much of the areola are pulled well into baby s mouth.      Returning to work or school  Focus on a good start to breastfeeding.  Many women continue to provide breastmilk for their baby when they return to work or school.  Making plans about where to pump and store milk can make the transition go well.  Talk with other mothers who have also returned to work or school for tips and support.  Your employer s Human Resource department may be a resource as  well.     Returning to work or school: (continued)     babies can mean fewer  sick  days for you.    A quality breast pump will also save time and add comfort.  Check with your insurance prior to giving birth for breast pump coverage.  Many insurance companies include a pump within your benefits.    Wait until your baby is at least three weeks old to introduce a bottle for the first time.  Have someone besides you give the bottle.    Breastfeed when you are with your baby. Reserve your bottles of breast milk for when you are away.     Your breasts will need to be  emptied  either by your baby or a pump.  Plan to pump at least twice in an eight hour day.    If you cannot pump at work, continue breastfeeding at home. Any amount of breast milk is worth giving to your baby.    Formula feeding facts  If you are planning to use formula to feed your baby, you will want to make some preparations ahead of time. Talk to your doctor or nurse-midwife about what type of formula to use. Some are iron-fortified, meaning they have extra iron in them. You will want to purchase formula and bottles before your baby is born to be sure you are ready after you return from the hospital. The [facility] do not provide formula samples to take home.    Be sure to follow formula mixing directions closely. Regular milk in the dairy case at the grocery store should not be given to babies under 1 year old. Baby formula is sold in several forms including:    Ready-to-use. This is the most expensive, but no mixing is necessary.    Concentrated liquid. This is less expensive than ready-to-use and you mix with water.    Powder. This is the least expensive. You mix one level scoop of powdered formula with two ounces of water and stir well.    Most babies need 2.5 ounces of formula per pound of body weight each day. This means an 8-pound baby may drink about 20 ounces of formula a day; however, this is just an estimate. The most  important thing is to pay attention to your baby s cues.  If your baby is always fussy, needs more iron or has certain food allergies, your physician may suggest you change your baby s formula to a different kind.     How do I warm my baby s bottles?  You may feed your baby a bottle without warming it first. It is OK for the breast milk or formula to be cool or room temperature. If your baby seems to prefer it warmed, you can put the filled bottle in a container of warm water and let it stand for a few minutes. Check the temperature of the liquid on your skin before feeding it to your baby; to be sure it isn t too hot. Do not heat bottles in the microwave. Microwaves heat food and liquids unevenly, and this can cause hot spots that can burn your baby.    How do I clean and sterilize bottles?  Sterilize bottles and nipples before you use them for the first time. You can do this by putting them in boiling water for 5 minutes. After that first time, you can wash them in hot and soapy water. Rinse them carefully to be sure there is no soap left on them. You can also wash them in the .    Breastfeeding/Chestfeeding Support  Contact us  Breastfeeding/chestfeeding is the natural and healthy way for parents to feed their babies and provides the best source of nutrition in most cases to infants. Breast milk has health benefits for mom, too. The American Academy of Pediatrics recommends exclusively breastfeeding for 6 months for optimal growth and development and breastfeeding up to at least a year.  Benefits to baby:    Easy digestion, less diarrhea and constipation, breast milk is easy to digest.    Lots of bonding with parent.    Less likely to have asthma.    Less ear infections and respiratory infections.    Less likely to have Type 2 Diabetes.    Less likely to become obese.    Lower risk of Sudden Infant Death Syndrome (SIDS).  Benefits to breastfeeding/chestfeeding parent:    Helps with weight loss.    Lower  risk of ovarian and breast cancer, Type 2 diabetes and heart disease.    /chestfed babies are easy to take on trips. Just grab the diapers and go!    Breastfeeding/chestfeeding saves money (no formula or bottle costs, fewer doctor bills and medication costs).    Ready to breastfeed/chestfeed anywhere, don t need to make and wash bottles.  Breastfeeding/chestfeeding is wonderful, but not always easy. Learning what to expect ahead of time and get support from a lactation professional. Check out the [facility] Breastfeeding Resource Guide for classes, drop in support groups, childbirth education, Doulas and clinic and hospital lactation services.     [facility] Baby Café  Due to COVID-19, all Baby Café sessions are canceled until further notice. For lactation support, please contact one of our bilingual staff:    Elida (IBCLC) [phone]    Na (IBCLC/ Serbian) [phone]    Tiarra (Hmong) [phone]    Ajay (Palauan) [phone]  Baby Café is a free, drop-in service offering breastfeeding/chestfeeding support. Come share tips and socialize with other pregnant, breastfeeding/chestfeeding families. Babies and siblings are welcome (no  available).  We offer:    Professionally trained lactation staff.    Resource books for lending.    Relaxed and fun atmosphere.    Refreshments.  Locations  Baby Café is offered at several locations.  Please see below for the Baby Café closest to you.    CANCELED UNTIL FURTHER NOTICE  [facility]  [address], [address]  1st Wednesdays of the month   10 a.m. - Noon    CANCELED UNTIL FURTHER NOTICE  [facility]  [address], [place], [address]  4th Wednesdays of the month   10 a.m. - 12:30 p.m.       CANCELED UNTIL FURTHER NOTICE  [facility]  [address], [place], [address]  4th Wednesdays of the month  4 - 6 p.m.    CANCELED UNTIL FURTHER NOTICE  [facility]  [address] [place] [address]  "Avenue, [place], [facility]   of the month.  9:30-11:30 a.m.  Enter through the east end of the building, the blue Door C.  Ring the ECFE buzzer to be let in.   More information  Naselwyn Fang  [phone]  [email or web address].         Virtual Breastfeeding Support:    During this time of isolation, breastfeeding families need even more community!  Here are some area organizations offering virtual support groups for breastfeeding:      [facility] Support Group,  at 10:30 am   Run by TABITHA Moreau of [employer] [facility] [facility]   Go to [employer] [facility] [facility] Facebook page and click on \"events\" for link   [email or web address]    [facility] Milk Hour,  at 2:30 pm    Run by TABITHA Tam   Go to [facility] FB page and send message to get link   [email or web address]://[email or web address].Prezto/healthfoundations/    [facility] holding virtual meetings the first Tuesday of each month, 8-9 pm, and the   Third Saturday, 10 - 11 am.  Go to [facility] and [place] FB page; message to get link [email or web address].Prezto/[email or web address]/?hc_location=[facility]    Teresa offers a Lactation Lounge every Friday 12pm - 1pm, run by Venita Lawson [facility] Leader   Sign up via link at Webstep/cbe-lactation   [email or web address]://www.Webstep/cbe-lactation    [facility] is offering virtual support groups every Monday, 10:30 am - 12 pm, run by nurse IBCLC   Https://www.[email or web address]/    Prenatal Breastfeeding Classes:        Teresa is offering virtual breastfeeding and  care classes:  https://www.Goko.Prezto/education-workshops    BirthEd childbirth and breastfeeding education offering virtual prenatal breastfeeding classes  Https://www.birthedmn.com/workshops      Preparing for your baby:    " Screening Program  Http://www.health.[facility].mn.us/newbornscreening/  [place] newborns are tested soon after birth for more than 50 hidden, rare disorders, including hearing loss and critical congenital heart disease (CCHD). This site provides resources and information for families and providers.    When to call:   Appointment line and to get a hold of CNM in clinic Monday-Friday 8 am - 5 pm:  [phone].  There are some clinics with early start times (1st appointment 7:40 am) and others with evening hours (last appointment 6:20 pm).  Most are typically open from 8 am to 5 pm.    CNM on call answering service: [phone].  Specify your hospital of choice and leave a brief message for CNM;  will then page CNM who is on call at your specified hospital and you should receive a call back with 15 minutes.  Be sure that your ringer is audible and that you can accept blocked calls so that we can get back in touch with you! This number should be reserved for urgent needs if during the day, before 8 am, after 5 pm, weekends, holidays.    Contact the on-call CNM with warning signs, such as:    vaginal bleeding     Vaginal discharge and itching or pain and burning during urination    Leg/calf pain or swelling on one side    severe abdominal pain    nausea and vomiting more than 4-5 times a day, or if you are unable to keep anything down    fever more than 100.4 degrees F.     Make plans for transportation and  as needed for when you are going to the hospital.    Ask your health care provider about vaccinations you may need following delivery. By now, you should have received a Tdap immunization to protect against pertussis or whooping cough. Fathers and family members who will be in close contact with the baby should also receive a Tdap shot at least two weeks before the expected birth of the baby if they have not had a Td (tetanus) shot for at least two years.    Your midwife may offer to check your  cervix for changes. If you are past your due date, discuss the next steps leading to delivery with your midwife. If you don't start labor on your own by 41 or 42 weeks, your midwife may recommend giving you medicines to ripen your cervix and start labor.  Induction of labor: http://onlinelibrary.brody.com/store/10.1016/j.jmwh.2008.04.018/asset/j.jmwh.2008.04.018.pdf?v=1&t=ungj4tqg&t=[email or web address]    Tell your midwife or physician how you plan to feed your baby (breast or bottle), who you have chosen to do pediatric care for your baby, and if you have a boy, whether you have chosen to have him circumcised. You will need a car seat correctly installed in your vehicle to bring your baby home. As you start to set up the nursery at home for your baby, make sure the crib is safe. The mattress needs to fit snugly against the edges of the crib. If you can fit a soda can between the bars, they are too far apart and can allow the baby's head to caught between them.    Learn about infant care and feeding, including information about infant CPR. We recommend that you put your baby to sleep on his or her back to reduce the chance of Sudden Infant Death Syndrome (SIDS). To maintain a healthy environment in which your child can grow, it's best to keep your home smoke-free. By preparing ahead, your transition into parenthood will go smoothly for you and your baby.    Your midwife will want to see you for a checkup 2 to 6 weeks after delivery.

## 2022-03-15 NOTE — PROGRESS NOTES
Melody is a 32 yr old  at 41w1d gestation who arrived on [facility] around 5pm for NST after variable decels seen on NST in clinic this afternoon. Baby active. This CNM received phone call from triage nurse at 5:25 pm with report of wandering baseline 140-170, with decelerations noted. Advised to start IV hydration, change maternal position and CNM to come and eval. This CNM arrive to unit around 5:40 pm. Pt tearful and upset about admission tonight. Up to bathroom and then cervical exam performed. Cerivx 1cm/50%/-2/anterior/soft. Discussed different options for cervical ripening then report given to oncoming CNWILLIAM Berry.  Plan for tonight is cervical ripening followed by IOL with close monitoring of fetal status.

## 2022-03-15 NOTE — PROGRESS NOTES
Pt sent to [place] from clinic.  Pt was having her 41 week appt, had a BPP (8/8), and an NST with 2 variables on it.  Pt here for continued EFM.  Abbie Collins RN

## 2022-03-15 NOTE — PROGRESS NOTES
Melody is a  at 41w1d who presents to clinic today for a routine prenatal visit.      Exam:  see prenatal flowsheet    The following topics were covered in today's visit:  1. BPP: done today just prior to clinic visit.  Formal report still being completed, but US score sheet says:   , VTX, placenta posterior, amn fluid MVP 4.5 cm .    2. PROCEDURE:    Clinic NST:  Baseline 130, mod variability, accels present.  No decels for first 18 minutes.  Then strip broken as baby had moved while CNM out of the room setting up IOL.  CNM re-adjusted monitor when came back into room.  Decel to cara ~98 X 60 seconds, then return to 120-140 X 2 minutes.  Then a second decel cara 100.  Baby moving so this decel broken so hard to tell lenght.  Then return to 130 baseline with mod variability for a few minutes.  Sent to [facility] for further monitoring.  Report given to Lily FLOREZ CNM  3. Postdates management options discussed:  Questions answered.  IOL set up Jarret 3/20 when will be 41.6 weeks.  To arrive at 7:30 AM (calling between 6-6:30).  Plan is for cervical ripening, but can switch to pitocin if ripe that day.  Patient declining cervical exam today.    *Patient still hopeful for further expectant management this week, and IOL this weekend only if still pregnant, but aware that this plan of care could change depending on FHR during further monitoring in [facility] this afternoon.  *Order placed for COVID test for planned procedure this coming weekend.  4. Will plan for another clinic visit in 1/2 week.  5. Reviewed CNM on-call number and when to call including for s/s of labor, srom, decreased fetal movement, bleeding, or other warning signs.  6. COVID-19 Mitigation:  vaccinated X3 with Moderna.

## 2022-03-15 NOTE — H&P
Date: 3/15/2022  Time: 6:49 PM    Admission H&P  JENNIFER Anders 1990, MRN [id number]    [facility]  Encounter for triage in pregnant patient [Z36.89]  Non-reassuring fetal heart rate or rhythm affecting management of mother [O36.8390]    PCP: Vannessa Moreno, [phone]          Extended Emergency Contact Information  Primary Emergency Contact: AYESHA WALDRON  Home Phone: [phone]  Relation: Spouse  Secondary Emergency Contact: Alvin Baron  Address: [address]  Mobile Phone: [phone]  Relation: Mother         Chief Complaint: Non-reassuring fetal heart rate or rhythm affecting management of mother, 41.1 week gestation pregnancy.         HPI:      Melody, is a 32 year old,  at 41w1d, LMP 21, Estimated Date of Delivery: Mar 7, 2022, confirmed by ultrasound at 12.4 weeks, admitted to [facility] on 3/15/2022 at 1800 secondary to: non-reassuring FHR on NST during routine 41 week monitoring.           Prenatal History:  Melody began care with [facility] at the  [facility] on 21 at 12.4 weeks gestation with regular care thereafter for a total of 13 visits. Her care was complicated by pre-pregnant BMI 34.      Pre-pregnant weight:  199 lbs   Pre-pregnant BMI: 34.67    Total weight gain:  5 lb. 3.2 oz.    Labs:    Prenatal Office Visit on 2022   Component Date Value Ref Range Status     Group B Strep PCR 2022 Negative  Negative Final    Presumed negative for Streptococcus agalactiae (Group B Streptococcus) or the number of organisms may be below the limit of detection of the assay.     Penicillin, amoxicillin, or cephal* 2022 No   Final     Hemoglobin 2022 11.9  11.7 - 15.7 g/dL Final     Hold Specimen 2022 [facility]   Final   Prenatal Office Visit on 2021   Component Date Value Ref Range Status     Treponema Antibody Total  12/17/2021 Nonreactive  Nonreactive Final     Hemoglobin 12/17/2021 11.6 (A) 11.7 - 15.7 g/dL Final     Glu Gest Screen 1hr 50g 12/17/2021 84  70 - 129 mg/dL Final   Office Visit on 09/13/2021   Component Date Value Ref Range Status     Cholesterol 09/13/2021 267 (A) <=199 mg/dL Final     Triglycerides 09/13/2021 150 (A) <=149 mg/dL Final     Direct Measure HDL 09/13/2021 51  >=50 mg/dL Final    HDL Cholesterol Reference Range:     0-2 years:   No reference ranges established for patients under 2 years old  [facility] for lipid analytes.    2-8 years:  Greater than 45 mg/dL     18 years and older:   Female: Greater than or equal to 50 mg/dL   Male:   Greater than or equal to 40 mg/dL     LDL Cholesterol Calculated 09/13/2021 186 (A) <=129 mg/dL Final     Patient Fasting > 8hrs? 09/13/2021 Yes   Final   Lab on 08/27/2021   Component Date Value Ref Range Status     Treponema Antibody Total 08/27/2021 Negative  Negative Final     Rubella Antibody IgG 08/27/2021 Positive   Final     HIV Antigen Antibody Combo 08/27/2021 Negative  Negative Final     Hepatitis C Antibody 08/27/2021 Negative  Negative Final     WBC Count 08/27/2021 7.2  4.0 - 11.0 10e3/uL Final     RBC Count 08/27/2021 3.89  3.80 - 5.20 10e6/uL Final     Hemoglobin 08/27/2021 11.4 (A) 11.7 - 15.7 g/dL Final     Hematocrit 08/27/2021 33.7 (A) 35.0 - 47.0 % Final     MCV 08/27/2021 87  78 - 100 fL Final     MCH 08/27/2021 29.3  26.5 - 33.0 pg Final     MCHC 08/27/2021 33.8  31.5 - 36.5 g/dL Final     RDW 08/27/2021 13.2  10.0 - 15.0 % Final     Platelet Count 08/27/2021 279  150 - 450 10e3/uL Final     Hepatitis B Surface Antigen 08/27/2021 Nonreactive  Nonreactive Final     ABO/RH(D) 08/27/2021 O POS   Final     Antibody Screen 08/27/2021 Negative  Negative Final     SPECIMEN EXPIRATION DATE 08/27/2021 [id number]   Final     Hemoglobin A1C 08/27/2021 5.1  <=5.6 % Final      Prediabetes: 5.7 to 6.4%        Diabetes:  >=6.5%     Patients  with Hgb F >5%, total bilirubin >10.0 mg/dL, abnormal red cell turnover, severe renal or hepatic disease or malignancy should not have this A1C method used to diagnose or monitor diabetes.    Prenatal Office Visit on 08/27/2021   Component Date Value Ref Range Status     Chlamydia Trachomatis 08/27/2021 Negative  Negative Final    Negative for C. trachomatis rRNA by transcription mediated amplification.   A negative result by transcription mediated amplification does not preclude the presence of infection because results are dependent on proper and adequate collection, absence of inhibitors and sufficient rRNA to be detected.     Neisseria gonorrhoeae 08/27/2021 Negative  Negative Final    Negative for N. gonorrhoeae rRNA by transcription mediated amplification. A negative result by transcription mediated amplification does not preclude the presence of C. trachomatis infection because results are dependent on proper and adequate collection, absence of inhibitors and sufficient rRNA to be detected.     Culture 08/27/2021 No Growth   Final     Interpretation 08/27/2021 Negative for Intraepithelial Lesion or Malignancy (NILM)    Final     Specimen Adequacy 08/27/2021 Satisfactory for eval, endocerv/transform zone component absent, patient pregnant   Final     Clinical Information 08/27/2021    Final                    Value:This result contains rich text formatting which cannot be displayed here.     Reflex Testing 08/27/2021 Yes regardless of result   Final     Previous Abnormal? 08/27/2021    Final                    Value:This result contains rich text formatting which cannot be displayed here.     Performing Labs 08/27/2021    Final                    Value:This result contains rich text formatting which cannot be displayed here.     Other HR HPV 08/27/2021 Negative  Negative Final     HPV16 DNA 08/27/2021 Negative  Negative Final     HPV18 DNA 08/27/2021 Negative  Negative Final     FINAL DIAGNOSIS 08/27/2021     Final                    Value:This result contains rich text formatting which cannot be displayed here.           Pertinent Radiology:           Working MARELY: 22 set by Arcelia Morales APRN CNM on 21 based on Ultrasound on 21     Based On MARELY GA Diff GA User Date   Last Menstrual Period on 21 (Exact Date) 03/15/22 -1w1d  Arcelia Morales APRN CNM 21   Ultrasound on 21 Working 12w4d Arcelia Morales APRN CNM 21   Ultrasound on 10/22/21  0 Comment: Nml fetal survey, growth and fluid; placenta Posterior 22 -4d 20w0d Barbara Lyon CNM 10/22/21             OB HISTORY  OB History    Para Term  AB Living   1 0 0 0 0 0   SAB IAB Ectopic Multiple Live Births   0 0 0 0 0      # Outcome Date GA Lbr Darvin/2nd Weight Sex Delivery Anes PTL Lv   1 Current                    Medical History  Past Medical History:   Diagnosis Date     Allergic urticaria      Migraine without aura and without status migrainosus, not intractable      Migraines      Urinary tract infection      Varicella     childhood      Surgical History  She  has a past surgical history that includes TOOTH EXTRACTION W/FORCEP and BIOPSY SKIN/SUBQ/MUC MEM, SINGLE LESION.       Social History  Reviewed, and she  reports that she has never smoked. She has never used smokeless tobacco. She reports previous alcohol use. She reports that she does not use drugs.  Partner: Timoteo  Education level: college graduate  Occupation:       Family History  Reviewed, and family history includes Alcoholism in her maternal uncle; Arthritis in her mother; Cerebrovascular Disease in her paternal uncle; Cerebrovascular Disease (age of onset: 68) in her father; Dementia in her maternal grandfather; Macular Degeneration in her maternal grandfather; No Known Problems in her sister and sister.          Allergies   Allergen Reactions     Sulfa Drugs      Vicodin [Acetaminophen]       Adhesive Tape Rash     Bacitracin Rash     Patient developed rash with use of bacitracin and had positive reaction with true test     Nickel Rash     Positive reaction with true test.       Medications Prior to Admission   Medication Sig Dispense Refill Last Dose     Docosahexaenoic Acid (PRENATAL DHA) 200 MG capsule         Ferrous Sulfate (IRON) 325 (65 Fe) MG tablet Take 1 tablet by mouth daily        Prenatal Vit-Fe Fumarate-FA (PRENATAL PO)            Review of Systems:  Pertinent items are noted in HPI.   Physical Exam:  Temp:  [98.2  F (36.8  C)] 98.2  F (36.8  C)  Pulse:  [64] 64  Resp:  [18] 18  BP: (116-119)/(66-80) 119/80      Heart:  RRR, negative murmur  Lungs:  Clear to ascultation bilaterally, non-labored breathing pattern  Abdomen:  Soft, non-tender, gravid with S = D, cephalic position per leopolds.  Legs:  no edema, no varicosities.    Other:  IV fluid bolus of LR started at 1733 in response to non-reassuring FHR.        Membrane Status:  Intact     Cervical Exam:  Exam done at 1758 by Lily DELA CRUZ, CNM:    1 cm, 50 % effaced, -2 station, anterior, cephalic (marquez 5-6).   Fetal Heart Rate:   Currently FHR baseline 173, min-mod variability, accels absent, intermittent variable decels.      Since arrival at 1651 has had moments in monitoring that appear Category I with accels, and then switched to indeterminate baseline (hard to know if was prolonged accels with a baseline of approximately 130s versus tachy baseline with variables to 110-130.   Uterine Activity:   contractions q 3-irregular minutes, mild to palpation.                Notable AP/IP factors to date:  1)  41.1 weeks gestation.  Routine post-dates monitoring today, with BPP 8/8 and clinic NST finding Category II FHR.  Transferred from clinic to [facility] for further monitoring.  After arrival non-reassuring FHR noted and plan made to admit patient and proceed with IOL.  Since the time that plan was established with patient, FHR has  become persistently tachycardic with episodes of minimal variability.    2)  Pre-pregnant obesity - BMI 34.69.  Total weight gain only 5+ lbs.  3) Anemia in pregnancy (lowest at IOB = 11.4).  Improved with oral supplementation alone.  4)  GBS negative.  HGB on 2/11/22 11.9.  Rubella immune.  5)  COVID vaccinated X3 8910-8902.  Tdap given 12/2021.        Impression:  -Melody is a 32 year old year old at 41w1d weeks gestation, being admitted for non-reassuring FHR tracing during post-dates fetal monitoring.   -BPP 8/8 today with Category II FHTs   -Pre-pregnant obesity (BMI 34.69).   -Unripe cervix       Plan:  1[facility]  2)  Continuous fetal monitoring.  In-house OB Dr Rae consulted regarding FHR.  Plan established to continue IV fluid bolus, move patient from triage to labor room, and to reevaluate in the next 30 minutes (sooner PRN).  Decision to be made at that time if need to proceed with c-birth or if IOL can be considered.  Patient/partner aware of situation.  All questions answered.  The couple states they are motivated to do whatever is best for their baby.  3)  Admission CBC and Type/Screen stat now  4) NPO status for now  5) Admission COVID test to be done      Total time with patient:  20 minutes at bedside and in the [facility] obtaining and clarifying the above history, performing the physical exam, education and counseling and developing this plan of care.  >50% spent on counseling and coordination of care.        Provider: GAUTAM Florez CNM

## 2022-03-16 ENCOUNTER — ANESTHESIA (OUTPATIENT)
Dept: OBGYN | Facility: CLINIC | Age: 32
End: 2022-03-16
Payer: COMMERCIAL

## 2022-03-16 ENCOUNTER — ANESTHESIA EVENT (OUTPATIENT)
Dept: OBGYN | Facility: CLINIC | Age: 32
End: 2022-03-16
Payer: COMMERCIAL

## 2022-03-16 PROCEDURE — 999N000249 HC STATISTIC C-SECTION ON UNIT: Performed by: STUDENT IN AN ORGANIZED HEALTH CARE EDUCATION/TRAINING PROGRAM

## 2022-03-16 PROCEDURE — 258N000003 HC RX IP 258 OP 636: Performed by: ADVANCED PRACTICE MIDWIFE

## 2022-03-16 PROCEDURE — 250N000011 HC RX IP 250 OP 636: Performed by: ADVANCED PRACTICE MIDWIFE

## 2022-03-16 PROCEDURE — 250N000011 HC RX IP 250 OP 636: Performed by: ANESTHESIOLOGY

## 2022-03-16 PROCEDURE — 250N000011 HC RX IP 250 OP 636: Performed by: STUDENT IN AN ORGANIZED HEALTH CARE EDUCATION/TRAINING PROGRAM

## 2022-03-16 PROCEDURE — C9290 INJ, BUPIVACAINE LIPOSOME: HCPCS | Performed by: ANESTHESIOLOGY

## 2022-03-16 PROCEDURE — 250N000013 HC RX MED GY IP 250 OP 250 PS 637: Performed by: STUDENT IN AN ORGANIZED HEALTH CARE EDUCATION/TRAINING PROGRAM

## 2022-03-16 PROCEDURE — 59200 INSERT CERVICAL DILATOR: CPT | Performed by: ADVANCED PRACTICE MIDWIFE

## 2022-03-16 PROCEDURE — 250N000011 HC RX IP 250 OP 636: Performed by: NURSE ANESTHETIST, CERTIFIED REGISTERED

## 2022-03-16 PROCEDURE — 272N000001 HC OR GENERAL SUPPLY STERILE: Performed by: STUDENT IN AN ORGANIZED HEALTH CARE EDUCATION/TRAINING PROGRAM

## 2022-03-16 PROCEDURE — 88307 TISSUE EXAM BY PATHOLOGIST: CPT | Mod: TC | Performed by: STUDENT IN AN ORGANIZED HEALTH CARE EDUCATION/TRAINING PROGRAM

## 2022-03-16 PROCEDURE — 360N000076 HC SURGERY LEVEL 3, PER MIN: Performed by: STUDENT IN AN ORGANIZED HEALTH CARE EDUCATION/TRAINING PROGRAM

## 2022-03-16 PROCEDURE — 250N000009 HC RX 250: Performed by: ADVANCED PRACTICE MIDWIFE

## 2022-03-16 PROCEDURE — 120N000001 HC R&B MED SURG/OB

## 2022-03-16 PROCEDURE — 99233 SBSQ HOSP IP/OBS HIGH 50: CPT | Mod: 25 | Performed by: ADVANCED PRACTICE MIDWIFE

## 2022-03-16 PROCEDURE — 999N000249 HC STATISTIC C-SECTION ON UNIT

## 2022-03-16 PROCEDURE — 370N000017 HC ANESTHESIA TECHNICAL FEE, PER MIN: Performed by: STUDENT IN AN ORGANIZED HEALTH CARE EDUCATION/TRAINING PROGRAM

## 2022-03-16 PROCEDURE — 258N000003 HC RX IP 258 OP 636: Performed by: NURSE ANESTHETIST, CERTIFIED REGISTERED

## 2022-03-16 RX ORDER — NALBUPHINE HYDROCHLORIDE 10 MG/ML
2.5-5 INJECTION, SOLUTION INTRAMUSCULAR; INTRAVENOUS; SUBCUTANEOUS EVERY 6 HOURS PRN
Status: DISCONTINUED | OUTPATIENT
Start: 2022-03-16 | End: 2022-03-18 | Stop reason: HOSPADM

## 2022-03-16 RX ORDER — BISACODYL 10 MG
10 SUPPOSITORY, RECTAL RECTAL DAILY PRN
Status: DISCONTINUED | OUTPATIENT
Start: 2022-03-18 | End: 2022-03-18 | Stop reason: HOSPADM

## 2022-03-16 RX ORDER — MISOPROSTOL 200 UG/1
400 TABLET ORAL
Status: DISCONTINUED | OUTPATIENT
Start: 2022-03-16 | End: 2022-03-18 | Stop reason: HOSPADM

## 2022-03-16 RX ORDER — DEXAMETHASONE SODIUM PHOSPHATE 4 MG/ML
INJECTION, SOLUTION INTRA-ARTICULAR; INTRALESIONAL; INTRAMUSCULAR; INTRAVENOUS; SOFT TISSUE PRN
Status: DISCONTINUED | OUTPATIENT
Start: 2022-03-16 | End: 2022-03-16

## 2022-03-16 RX ORDER — PROCHLORPERAZINE 25 MG
25 SUPPOSITORY, RECTAL RECTAL EVERY 12 HOURS PRN
Status: DISCONTINUED | OUTPATIENT
Start: 2022-03-16 | End: 2022-03-18 | Stop reason: HOSPADM

## 2022-03-16 RX ORDER — MISOPROSTOL 200 UG/1
800 TABLET ORAL
Status: DISCONTINUED | OUTPATIENT
Start: 2022-03-16 | End: 2022-03-16 | Stop reason: HOSPADM

## 2022-03-16 RX ORDER — BUPIVACAINE HYDROCHLORIDE 7.5 MG/ML
INJECTION, SOLUTION INTRASPINAL
Status: COMPLETED | OUTPATIENT
Start: 2022-03-16 | End: 2022-03-16

## 2022-03-16 RX ORDER — OXYCODONE HYDROCHLORIDE 5 MG/1
5 TABLET ORAL EVERY 4 HOURS PRN
Status: DISCONTINUED | OUTPATIENT
Start: 2022-03-16 | End: 2022-03-18 | Stop reason: HOSPADM

## 2022-03-16 RX ORDER — FENTANYL CITRATE 50 UG/ML
25 INJECTION, SOLUTION INTRAMUSCULAR; INTRAVENOUS EVERY 5 MIN PRN
Status: CANCELLED | OUTPATIENT
Start: 2022-03-16

## 2022-03-16 RX ORDER — OXYTOCIN 10 [USP'U]/ML
10 INJECTION, SOLUTION INTRAMUSCULAR; INTRAVENOUS
Status: DISCONTINUED | OUTPATIENT
Start: 2022-03-16 | End: 2022-03-18 | Stop reason: HOSPADM

## 2022-03-16 RX ORDER — FENTANYL CITRATE 50 UG/ML
15 INJECTION, SOLUTION INTRAMUSCULAR; INTRAVENOUS ONCE
Status: DISCONTINUED | OUTPATIENT
Start: 2022-03-16 | End: 2022-03-16 | Stop reason: HOSPADM

## 2022-03-16 RX ORDER — OXYTOCIN/0.9 % SODIUM CHLORIDE 30/500 ML
340 PLASTIC BAG, INJECTION (ML) INTRAVENOUS CONTINUOUS PRN
Status: DISCONTINUED | OUTPATIENT
Start: 2022-03-16 | End: 2022-03-16 | Stop reason: HOSPADM

## 2022-03-16 RX ORDER — METOCLOPRAMIDE HYDROCHLORIDE 5 MG/ML
10 INJECTION INTRAMUSCULAR; INTRAVENOUS EVERY 6 HOURS PRN
Status: DISCONTINUED | OUTPATIENT
Start: 2022-03-16 | End: 2022-03-18 | Stop reason: HOSPADM

## 2022-03-16 RX ORDER — CARBOPROST TROMETHAMINE 250 UG/ML
250 INJECTION, SOLUTION INTRAMUSCULAR
Status: DISCONTINUED | OUTPATIENT
Start: 2022-03-16 | End: 2022-03-18 | Stop reason: HOSPADM

## 2022-03-16 RX ORDER — CEFAZOLIN SODIUM 2 G/100ML
2 INJECTION, SOLUTION INTRAVENOUS SEE ADMIN INSTRUCTIONS
Status: DISCONTINUED | OUTPATIENT
Start: 2022-03-16 | End: 2022-03-16 | Stop reason: HOSPADM

## 2022-03-16 RX ORDER — OXYTOCIN/0.9 % SODIUM CHLORIDE 30/500 ML
100-340 PLASTIC BAG, INJECTION (ML) INTRAVENOUS CONTINUOUS PRN
Status: CANCELLED | OUTPATIENT
Start: 2022-03-16

## 2022-03-16 RX ORDER — ACETAMINOPHEN 325 MG/1
975 TABLET ORAL ONCE
Status: COMPLETED | OUTPATIENT
Start: 2022-03-16 | End: 2022-03-16

## 2022-03-16 RX ORDER — MISOPROSTOL 200 UG/1
400 TABLET ORAL
Status: DISCONTINUED | OUTPATIENT
Start: 2022-03-16 | End: 2022-03-16 | Stop reason: HOSPADM

## 2022-03-16 RX ORDER — FENTANYL CITRATE-0.9 % NACL/PF 10 MCG/ML
PLASTIC BAG, INJECTION (ML) INTRAVENOUS CONTINUOUS PRN
Status: DISCONTINUED | OUTPATIENT
Start: 2022-03-16 | End: 2022-03-16

## 2022-03-16 RX ORDER — PROCHLORPERAZINE MALEATE 10 MG
10 TABLET ORAL EVERY 6 HOURS PRN
Status: DISCONTINUED | OUTPATIENT
Start: 2022-03-16 | End: 2022-03-18 | Stop reason: HOSPADM

## 2022-03-16 RX ORDER — CEFAZOLIN SODIUM 2 G/100ML
2 INJECTION, SOLUTION INTRAVENOUS
Status: COMPLETED | OUTPATIENT
Start: 2022-03-16 | End: 2022-03-16

## 2022-03-16 RX ORDER — OXYTOCIN 10 [USP'U]/ML
10 INJECTION, SOLUTION INTRAMUSCULAR; INTRAVENOUS
Status: CANCELLED | OUTPATIENT
Start: 2022-03-16

## 2022-03-16 RX ORDER — OXYTOCIN/0.9 % SODIUM CHLORIDE 30/500 ML
340 PLASTIC BAG, INJECTION (ML) INTRAVENOUS CONTINUOUS PRN
Status: DISCONTINUED | OUTPATIENT
Start: 2022-03-16 | End: 2022-03-18 | Stop reason: HOSPADM

## 2022-03-16 RX ORDER — ACETAMINOPHEN 325 MG/1
975 TABLET ORAL EVERY 6 HOURS
Status: DISCONTINUED | OUTPATIENT
Start: 2022-03-16 | End: 2022-03-18 | Stop reason: HOSPADM

## 2022-03-16 RX ORDER — ONDANSETRON 2 MG/ML
4 INJECTION INTRAMUSCULAR; INTRAVENOUS EVERY 6 HOURS PRN
Status: DISCONTINUED | OUTPATIENT
Start: 2022-03-16 | End: 2022-03-16 | Stop reason: HOSPADM

## 2022-03-16 RX ORDER — OXYTOCIN 10 [USP'U]/ML
10 INJECTION, SOLUTION INTRAMUSCULAR; INTRAVENOUS
Status: DISCONTINUED | OUTPATIENT
Start: 2022-03-16 | End: 2022-03-16 | Stop reason: HOSPADM

## 2022-03-16 RX ORDER — OXYCODONE HYDROCHLORIDE 5 MG/1
5 TABLET ORAL EVERY 4 HOURS PRN
Status: CANCELLED | OUTPATIENT
Start: 2022-03-16

## 2022-03-16 RX ORDER — CITRIC ACID/SODIUM CITRATE 334-500MG
30 SOLUTION, ORAL ORAL
Status: DISCONTINUED | OUTPATIENT
Start: 2022-03-16 | End: 2022-03-16 | Stop reason: HOSPADM

## 2022-03-16 RX ORDER — DIPHENHYDRAMINE HCL 25 MG
25 CAPSULE ORAL EVERY 6 HOURS PRN
Status: DISCONTINUED | OUTPATIENT
Start: 2022-03-16 | End: 2022-03-18 | Stop reason: HOSPADM

## 2022-03-16 RX ORDER — HALOPERIDOL 5 MG/ML
1 INJECTION INTRAMUSCULAR
Status: CANCELLED | OUTPATIENT
Start: 2022-03-16

## 2022-03-16 RX ORDER — MISOPROSTOL 200 UG/1
800 TABLET ORAL
Status: DISCONTINUED | OUTPATIENT
Start: 2022-03-16 | End: 2022-03-18 | Stop reason: HOSPADM

## 2022-03-16 RX ORDER — DIPHENHYDRAMINE HYDROCHLORIDE 50 MG/ML
25 INJECTION INTRAMUSCULAR; INTRAVENOUS EVERY 6 HOURS PRN
Status: DISCONTINUED | OUTPATIENT
Start: 2022-03-16 | End: 2022-03-18 | Stop reason: HOSPADM

## 2022-03-16 RX ORDER — FENTANYL CITRATE 50 UG/ML
INJECTION, SOLUTION INTRAMUSCULAR; INTRAVENOUS
Status: COMPLETED | OUTPATIENT
Start: 2022-03-16 | End: 2022-03-16

## 2022-03-16 RX ORDER — SODIUM CHLORIDE, SODIUM LACTATE, POTASSIUM CHLORIDE, CALCIUM CHLORIDE 600; 310; 30; 20 MG/100ML; MG/100ML; MG/100ML; MG/100ML
INJECTION, SOLUTION INTRAVENOUS CONTINUOUS
Status: DISCONTINUED | OUTPATIENT
Start: 2022-03-16 | End: 2022-03-16 | Stop reason: HOSPADM

## 2022-03-16 RX ORDER — MODIFIED LANOLIN
OINTMENT (GRAM) TOPICAL
Status: DISCONTINUED | OUTPATIENT
Start: 2022-03-16 | End: 2022-03-18 | Stop reason: HOSPADM

## 2022-03-16 RX ORDER — SODIUM CHLORIDE, SODIUM LACTATE, POTASSIUM CHLORIDE, CALCIUM CHLORIDE 600; 310; 30; 20 MG/100ML; MG/100ML; MG/100ML; MG/100ML
INJECTION, SOLUTION INTRAVENOUS CONTINUOUS
Status: CANCELLED | OUTPATIENT
Start: 2022-03-16

## 2022-03-16 RX ORDER — MORPHINE SULFATE 1 MG/ML
INJECTION, SOLUTION EPIDURAL; INTRATHECAL; INTRAVENOUS
Status: COMPLETED | OUTPATIENT
Start: 2022-03-16 | End: 2022-03-16

## 2022-03-16 RX ORDER — AMOXICILLIN 250 MG
1 CAPSULE ORAL 2 TIMES DAILY
Status: DISCONTINUED | OUTPATIENT
Start: 2022-03-16 | End: 2022-03-18 | Stop reason: HOSPADM

## 2022-03-16 RX ORDER — NALOXONE HYDROCHLORIDE 0.4 MG/ML
0.4 INJECTION, SOLUTION INTRAMUSCULAR; INTRAVENOUS; SUBCUTANEOUS
Status: DISCONTINUED | OUTPATIENT
Start: 2022-03-16 | End: 2022-03-18 | Stop reason: HOSPADM

## 2022-03-16 RX ORDER — LIDOCAINE 40 MG/G
CREAM TOPICAL
Status: DISCONTINUED | OUTPATIENT
Start: 2022-03-16 | End: 2022-03-18 | Stop reason: HOSPADM

## 2022-03-16 RX ORDER — AMOXICILLIN 250 MG
2 CAPSULE ORAL 2 TIMES DAILY
Status: DISCONTINUED | OUTPATIENT
Start: 2022-03-16 | End: 2022-03-18 | Stop reason: HOSPADM

## 2022-03-16 RX ORDER — KETOROLAC TROMETHAMINE 30 MG/ML
30 INJECTION, SOLUTION INTRAMUSCULAR; INTRAVENOUS EVERY 6 HOURS
Status: COMPLETED | OUTPATIENT
Start: 2022-03-16 | End: 2022-03-17

## 2022-03-16 RX ORDER — SODIUM CHLORIDE, SODIUM LACTATE, POTASSIUM CHLORIDE, CALCIUM CHLORIDE 600; 310; 30; 20 MG/100ML; MG/100ML; MG/100ML; MG/100ML
INJECTION, SOLUTION INTRAVENOUS CONTINUOUS PRN
Status: DISCONTINUED | OUTPATIENT
Start: 2022-03-16 | End: 2022-03-16

## 2022-03-16 RX ORDER — ONDANSETRON 4 MG/1
4 TABLET, ORALLY DISINTEGRATING ORAL EVERY 6 HOURS PRN
Status: DISCONTINUED | OUTPATIENT
Start: 2022-03-16 | End: 2022-03-16 | Stop reason: HOSPADM

## 2022-03-16 RX ORDER — LIDOCAINE 40 MG/G
CREAM TOPICAL
Status: DISCONTINUED | OUTPATIENT
Start: 2022-03-16 | End: 2022-03-16 | Stop reason: HOSPADM

## 2022-03-16 RX ORDER — METHYLERGONOVINE MALEATE 0.2 MG/ML
200 INJECTION INTRAVENOUS
Status: DISCONTINUED | OUTPATIENT
Start: 2022-03-16 | End: 2022-03-16 | Stop reason: HOSPADM

## 2022-03-16 RX ORDER — CARBOPROST TROMETHAMINE 250 UG/ML
250 INJECTION, SOLUTION INTRAMUSCULAR
Status: DISCONTINUED | OUTPATIENT
Start: 2022-03-16 | End: 2022-03-16 | Stop reason: HOSPADM

## 2022-03-16 RX ORDER — ONDANSETRON 2 MG/ML
4 INJECTION INTRAMUSCULAR; INTRAVENOUS EVERY 6 HOURS PRN
Status: DISCONTINUED | OUTPATIENT
Start: 2022-03-16 | End: 2022-03-18 | Stop reason: HOSPADM

## 2022-03-16 RX ORDER — METOCLOPRAMIDE 10 MG/1
10 TABLET ORAL EVERY 6 HOURS PRN
Status: DISCONTINUED | OUTPATIENT
Start: 2022-03-16 | End: 2022-03-18 | Stop reason: HOSPADM

## 2022-03-16 RX ORDER — ONDANSETRON 4 MG/1
4 TABLET, ORALLY DISINTEGRATING ORAL EVERY 30 MIN PRN
Status: CANCELLED | OUTPATIENT
Start: 2022-03-16

## 2022-03-16 RX ORDER — MORPHINE SULFATE 1 MG/ML
150 INJECTION, SOLUTION EPIDURAL; INTRATHECAL; INTRAVENOUS ONCE
Status: DISCONTINUED | OUTPATIENT
Start: 2022-03-16 | End: 2022-03-16 | Stop reason: HOSPADM

## 2022-03-16 RX ORDER — ONDANSETRON 2 MG/ML
4 INJECTION INTRAMUSCULAR; INTRAVENOUS EVERY 30 MIN PRN
Status: CANCELLED | OUTPATIENT
Start: 2022-03-16

## 2022-03-16 RX ORDER — NALOXONE HYDROCHLORIDE 0.4 MG/ML
0.2 INJECTION, SOLUTION INTRAMUSCULAR; INTRAVENOUS; SUBCUTANEOUS
Status: DISCONTINUED | OUTPATIENT
Start: 2022-03-16 | End: 2022-03-18 | Stop reason: HOSPADM

## 2022-03-16 RX ORDER — EPHEDRINE SULFATE 50 MG/ML
5 INJECTION, SOLUTION INTRAMUSCULAR; INTRAVENOUS; SUBCUTANEOUS
Status: DISCONTINUED | OUTPATIENT
Start: 2022-03-16 | End: 2022-03-16 | Stop reason: HOSPADM

## 2022-03-16 RX ORDER — IBUPROFEN 800 MG/1
800 TABLET, FILM COATED ORAL EVERY 6 HOURS
Status: DISCONTINUED | OUTPATIENT
Start: 2022-03-17 | End: 2022-03-18 | Stop reason: HOSPADM

## 2022-03-16 RX ORDER — HYDROCORTISONE 2.5 %
CREAM (GRAM) TOPICAL 3 TIMES DAILY PRN
Status: DISCONTINUED | OUTPATIENT
Start: 2022-03-16 | End: 2022-03-18 | Stop reason: HOSPADM

## 2022-03-16 RX ORDER — METHYLERGONOVINE MALEATE 0.2 MG/ML
200 INJECTION INTRAVENOUS
Status: DISCONTINUED | OUTPATIENT
Start: 2022-03-16 | End: 2022-03-18 | Stop reason: HOSPADM

## 2022-03-16 RX ORDER — ONDANSETRON 4 MG/1
4 TABLET, ORALLY DISINTEGRATING ORAL EVERY 6 HOURS PRN
Status: DISCONTINUED | OUTPATIENT
Start: 2022-03-16 | End: 2022-03-18 | Stop reason: HOSPADM

## 2022-03-16 RX ORDER — SIMETHICONE 80 MG
80 TABLET,CHEWABLE ORAL 4 TIMES DAILY PRN
Status: DISCONTINUED | OUTPATIENT
Start: 2022-03-16 | End: 2022-03-18 | Stop reason: HOSPADM

## 2022-03-16 RX ORDER — DEXTROSE, SODIUM CHLORIDE, SODIUM LACTATE, POTASSIUM CHLORIDE, AND CALCIUM CHLORIDE 5; .6; .31; .03; .02 G/100ML; G/100ML; G/100ML; G/100ML; G/100ML
INJECTION, SOLUTION INTRAVENOUS CONTINUOUS
Status: DISCONTINUED | OUTPATIENT
Start: 2022-03-16 | End: 2022-03-18 | Stop reason: HOSPADM

## 2022-03-16 RX ORDER — BUPIVACAINE HYDROCHLORIDE 2.5 MG/ML
INJECTION, SOLUTION EPIDURAL; INFILTRATION; INTRACAUDAL
Status: COMPLETED | OUTPATIENT
Start: 2022-03-16 | End: 2022-03-16

## 2022-03-16 RX ADMIN — ONDANSETRON 4 MG: 2 INJECTION INTRAMUSCULAR; INTRAVENOUS at 06:11

## 2022-03-16 RX ADMIN — KETOROLAC TROMETHAMINE 30 MG: 30 INJECTION, SOLUTION INTRAMUSCULAR; INTRAVENOUS at 19:59

## 2022-03-16 RX ADMIN — SODIUM CHLORIDE, POTASSIUM CHLORIDE, SODIUM LACTATE AND CALCIUM CHLORIDE: 600; 310; 30; 20 INJECTION, SOLUTION INTRAVENOUS at 01:17

## 2022-03-16 RX ADMIN — ACETAMINOPHEN 975 MG: 325 TABLET ORAL at 19:58

## 2022-03-16 RX ADMIN — Medication 600 ML/HR: at 06:07

## 2022-03-16 RX ADMIN — KETOROLAC TROMETHAMINE 30 MG: 30 INJECTION, SOLUTION INTRAMUSCULAR at 08:08

## 2022-03-16 RX ADMIN — SENNOSIDES AND DOCUSATE SODIUM 1 TABLET: 50; 8.6 TABLET ORAL at 19:59

## 2022-03-16 RX ADMIN — BUPIVACAINE 20 ML: 13.3 INJECTION, SUSPENSION, LIPOSOMAL INFILTRATION at 06:50

## 2022-03-16 RX ADMIN — ACETAMINOPHEN 975 MG: 325 TABLET ORAL at 08:08

## 2022-03-16 RX ADMIN — Medication 40 MCG/MIN: at 05:52

## 2022-03-16 RX ADMIN — Medication 0.15 MG: at 05:48

## 2022-03-16 RX ADMIN — DEXAMETHASONE SODIUM PHOSPHATE 10 MG: 4 INJECTION, SOLUTION INTRA-ARTICULAR; INTRALESIONAL; INTRAMUSCULAR; INTRAVENOUS; SOFT TISSUE at 06:11

## 2022-03-16 RX ADMIN — SODIUM CHLORIDE, POTASSIUM CHLORIDE, SODIUM LACTATE AND CALCIUM CHLORIDE: 600; 310; 30; 20 INJECTION, SOLUTION INTRAVENOUS at 05:42

## 2022-03-16 RX ADMIN — SENNOSIDES AND DOCUSATE SODIUM 1 TABLET: 50; 8.6 TABLET ORAL at 08:09

## 2022-03-16 RX ADMIN — ACETAMINOPHEN 975 MG: 325 TABLET ORAL at 13:48

## 2022-03-16 RX ADMIN — KETOROLAC TROMETHAMINE 30 MG: 30 INJECTION, SOLUTION INTRAMUSCULAR; INTRAVENOUS at 13:47

## 2022-03-16 RX ADMIN — BUPIVACAINE HYDROCHLORIDE IN DEXTROSE 1.4 ML: 7.5 INJECTION, SOLUTION SUBARACHNOID at 05:48

## 2022-03-16 RX ADMIN — FENTANYL CITRATE 15 MCG: 50 INJECTION, SOLUTION INTRAMUSCULAR; INTRAVENOUS at 05:48

## 2022-03-16 RX ADMIN — CEFAZOLIN SODIUM 2 G: 2 INJECTION, SOLUTION INTRAVENOUS at 05:57

## 2022-03-16 RX ADMIN — BUPIVACAINE HYDROCHLORIDE 20 ML: 2.5 INJECTION, SOLUTION EPIDURAL; INFILTRATION; INTRACAUDAL at 06:50

## 2022-03-16 RX ADMIN — ACETAMINOPHEN 975 MG: 325 TABLET ORAL at 05:29

## 2022-03-16 RX ADMIN — ONDANSETRON 4 MG: 2 INJECTION INTRAMUSCULAR; INTRAVENOUS at 13:12

## 2022-03-16 ASSESSMENT — ACTIVITIES OF DAILY LIVING (ADL)
ADLS_ACUITY_SCORE: 3

## 2022-03-16 NOTE — CONSULTS
CONSULTATION - GYN    NAME: Melody Babcock   : 1990   MRN: [id number]      [facility]    ADMISSION DATE: 3/15/2022    PCP:  Vannessa Moreno     CHIEF COMPLAINT: decelerations of FHT    HPI: I have been requested by Martine Berry to evaluate Melody Babcock for fetal decelerations.  Patient is a 32 year old G1 who was seen today in clinic BPP was done which was , NST done in clinic and spontaneous deceleration occurred.  She was sent to L&D for continued monitoring, she has had intermittent decelerations since arriving on L&D.    PAST MEDICAL HISTORY:  Past Medical History:   Diagnosis Date     Allergic urticaria      Migraine without aura and without status migrainosus, not intractable      Migraines      Urinary tract infection      Varicella     childhood       PAST SURGICAL HISTORY:  Past Surgical History:   Procedure Laterality Date     AS BIOPSY SKIN/SUBQ/MUC MEM, SINGLE LESION      mole removal     HC TOOTH EXTRACTION W/FORCEP         SOCIAL HISTORY:  Social History     Socioeconomic History     Marital status:      Spouse name: Not on file     Number of children: Not on file     Years of education: Not on file     Highest education level: Not on file   Occupational History     Not on file   Tobacco Use     Smoking status: Never Smoker     Smokeless tobacco: Never Used   Substance and Sexual Activity     Alcohol use: Not Currently     Alcohol/week: 0.0 standard drinks     Comment: Pregnant, so not drinking     Drug use: No     Sexual activity: Yes     Partners: Male     Birth control/protection: None   Other Topics Concern     Parent/sibling w/ CABG, MI or angioplasty before 65F 55M? No   Social History Narrative     Not on file     Social Determinants of Health     Financial Resource Strain: Not on file   Food Insecurity: Not on file   Transportation Needs: Not on file   Physical Activity: Not on file   Stress: Not on file   Social Connections: Not on file   Intimate  Partner Violence: Not on file   Housing Stability: Not on file       MEDICATIONS:  Current Facility-Administered Medications   Medication     carboprost (HEMABATE) injection 250 mcg     ketorolac (TORADOL) injection 30 mg    Or     ketorolac (TORADOL) injection 30 mg    Or     ibuprofen (ADVIL/MOTRIN) tablet 600 mg     lactated ringers infusion     lidocaine 1 % 0.1-20 mL     methylergonovine (METHERGINE) injection 200 mcg     metoclopramide (REGLAN) injection 10 mg    Or     metoclopramide (REGLAN) tablet 10 mg     misoprostol (CYTOTEC) tablet 400 mcg    Or     misoprostol (CYTOTEC) tablet 800 mcg     naloxone (NARCAN) injection 0.2 mg    Or     naloxone (NARCAN) injection 0.4 mg    Or     naloxone (NARCAN) injection 0.2 mg    Or     naloxone (NARCAN) injection 0.4 mg     ondansetron (ZOFRAN-ODT) ODT tab 4 mg    Or     ondansetron (ZOFRAN) injection 4 mg     oxytocin (PITOCIN) 30 units in 500 mL 0.9% NaCl infusion     oxytocin (PITOCIN) 30 units in 500 mL 0.9% NaCl infusion     oxytocin (PITOCIN) 30 units in 500 mL 0.9% NaCl infusion     oxytocin (PITOCIN) injection 10 Units     oxytocin (PITOCIN) injection 10 Units     prochlorperazine (COMPAZINE) injection 10 mg    Or     prochlorperazine (COMPAZINE) tablet 10 mg    Or     prochlorperazine (COMPAZINE) suppository 25 mg     tranexamic acid (CYKLOKAPRON) bolus 1 g vial attach to NaCl 50 or 100 mL bag ADULT       ALLERGIES:  Allergies   Allergen Reactions     Sulfa Drugs      Vicodin [Acetaminophen]      Adhesive Tape Rash     Bacitracin Rash     Patient developed rash with use of bacitracin and had positive reaction with true test     Nickel Rash     Positive reaction with true test.        REVIEW OF SYSTEMS    Negative except what is stated in the HPI    PHYSICAL EXAM:  /80   Temp 98.2  F (36.8  C) (Oral)   Resp 18   LMP 06/08/2021 (Exact Date)   Breastfeeding No    General Appearance: Alert, appropriate appearance for age. No acute distress,   HEENT :  Grossly normal  Cardiovascular: Regular rate and rhythm   Gastrointestinal: soft, gravid  Pelvic Exam Female: SVE per CNM /-2  Skin: no rash or abnormalities,   Neurologic: Normal gait and speech   Psychiatric: Alert and oriented, appropriate affect.    LABS  Lab Results   Component Value Date    HGB 11.5 (L) 03/15/2022     FHT: Iat 1703 baseline 130's, moderate variability, late decels with contractions at 1712, 1715, 1719 and 1725 and 1742.  Broken tracing from 4715-4266, baseline then 160's with periods of minimal variability and intermittent variable decelerations.  Patient moved to room, at 1839 baseline 150's, moderate variability, accels present, decel at 1856 to 100's with slow return to baseline over 3 minutes.      IMPRESSION:  32 year old  Female,  with fetal decelerations    RECOMMENDATIONS:  Agree with plan to admit now for delivery  Recommend CST now, if negative CST then may be reasonable to proceed with cervical ripening, would favor cervidil or balloon for ripening given reversibility if fetal distress occurs.  If CST positive then would recommend .  Reviewed risks of a  including pain, bleeding, infection, damage to nearby organs including bowel, bladder, ureters, possibly requiring additional surgery for repair.  Reviewed risks in future pregnancies.      Thank you for allowing us to participate in the care of this patient.  Please contact us with any questions/concerns.    Ashley Rae MD     Addendum:  From  baseline 140's, moderate variability, 4 contractions occurred with late decelerations at 1945, 1949 and 1952.  At this point Martine Berry and HUMZA began discussing the positive CST, however with subsequent monitoring FHT improved.  Moderate variability, accels present and no decelerations for past 30 minutes with contractions now q 3 minutes.  Given currently reassuring fetal status and negative CST, discussed option of starting induction.  Discussed with  patient recommendation for mechanical ripening given that these are highly effective at ripening the cervix with minimal stress to baby, also can be removed easily if baby does not tolerate.  Discussed possibility of conversion to  if baby does not tolerate induction including possibilities such as emergency  under general anesthesia if baby in acute distress.  Again reviewed risks of  and expected recovery.  Patient was given the opportunity to ask questions now in a non-emergent setting.      MD Vito

## 2022-03-16 NOTE — ANESTHESIA POSTPROCEDURE EVALUATION
Patient: Melody Babcock    Procedure: Procedure(s):   SECTION       Anesthesia Type:  Spinal    Note:  Disposition: Inpatient   Postop Pain Control: Uneventful            Sign Out: Well controlled pain   PONV: No   Neuro/Psych: Uneventful            Sign Out: Acceptable/Baseline neuro status   Airway/Respiratory: Uneventful            Sign Out: Acceptable/Baseline resp. status   CV/Hemodynamics: Uneventful            Sign Out: Acceptable CV status; No obvious hypovolemia; No obvious fluid overload   Other NRE: NONE   DID A NON-ROUTINE EVENT OCCUR? No           Last vitals:  Vitals Value Taken Time   /64 22 1106   Temp 36.6  C (97.9  F) 22 1100   Pulse 62 22 0711   Resp 16 22 1100   SpO2 98 % 22 1128   Vitals shown include unvalidated device data.    Electronically Signed By: Silvestre Barnes MD  2022  11:32 AM

## 2022-03-16 NOTE — PROGRESS NOTES
Labor Progress Note:    Patient Name:  Melody Babcock  :      1990  MRN:      [id number]      CNM connected with Dr Rae again to establish plan.  Reviewed FHR strip and maternal status together.  In light of recent prolonged FHR decel in response to maternal movement, plan established to talk with patient/partner about 2 options:    1. Primary  this evening       -versus-    2. Contraction stress test via pitocin before considering cervical ripening.      To go present options to patient/partner together and engage in a conversation.    Melody is doing well.  Resting in bed with Timoteo at her side.  Melody remains NPO, Timoteo was able to eat supper.    The couple engages in a good conversation with Dr Rae, with ultimate decision to proceed with contraction stress test.      Objective:  /80   Temp 98.2  F (36.8  C) (Oral)   Resp 18   LMP 2021 (Exact Date)   Breastfeeding No     FHR: 143, mod variability, accels present, prolonged decel in response to maternal movement (cara 95 with slow return to baseline over 3.5 minutes).  .   Contractions: q 2-6 minutes, mild to palpation.  Cervix: exam deferred at this time.  Other: Melody reports that she recently started feeling more contractions        Total time spent with patient: 50 minutes, of which >50% time spent counseling and coordination of care.      Provider:  GAUTAM Florez/BRITTON        Date:  3/15/2022  Time:  7:44 PM

## 2022-03-16 NOTE — ANESTHESIA PROCEDURE NOTES
TAP Procedure Note    Pre-Procedure   Staff -        Anesthesiologist:  Silvestre Barnes MD       Performed By: anesthesiologist       Location: OR       Procedure Start/Stop Times: 3/16/2022 6:46 AM and 3/16/2022 6:52 AM       Pre-Anesthestic Checklist: patient identified, IV checked, site marked, risks and benefits discussed, informed consent, monitors and equipment checked, pre-op evaluation, at physician/surgeon's request and post-op pain management  Timeout:       Correct Patient: Yes        Correct Procedure: Yes        Correct Site: Yes        Correct Position: Yes        Correct Laterality: Yes        Site Marked: Yes  Procedure Documentation  Procedure: TAP       Diagnosis: POST OP PAIN CONTROL       Laterality: bilateral       Patient Position: supine       Patient Prep/Sterile Barriers: sterile gloves, mask       Skin prep: Chloraprep       Needle Type: short bevel       Needle Gauge: 20.        Needle Length (Inches): 4        Ultrasound guided       1. Ultrasound was used to identify targeted nerve, plexus, vascular marker, or fascial plane and place a needle adjacent to it in real-time.       2. Ultrasound was used to visualize the spread of anesthetic in close proximity to the above referenced structure.       3. A permanent image is entered into the patient's record.       4. The visualized anatomic structures appeared normal.       5. There were no apparent abnormal pathologic findings.    Assessment/Narrative         The placement was negative for: blood aspirated, painful injection and site bleeding       Paresthesias: No.      Bolus given via needle. No blood aspirated via catheter.        Secured via.        Insertion/Infusion Method: Single Shot       Complications: none       Injection made incrementally with aspirations every 5 mL.    Medication(s) Administered   Bupivacaine 0.25% PF (Infiltration), 20 mL  Bupivacaine liposome (Exparel) 1.3% LA inj susp (Infiltration), 20 mL  Medication  Administration Time: 3/16/2022 6:50 AM

## 2022-03-16 NOTE — PROGRESS NOTES
Asked again to review FHT.  Category 2 from 0410 to 0500 with recurrent late decelerations, moderate variability.  At this point recommended  due to intermittent category II FHT and still remote from delivery.  Again reviewed risks, benefits and alternatives and patient agrees to proceed.    MD Vito

## 2022-03-16 NOTE — PLAN OF CARE
Problem: Plan of Care - These are the overarching goals to be used throughout the patient stay.    Goal: Absence of Hospital-Acquired Illness or Injury  Intervention: Prevent and Manage VTE (Venous Thromboembolism) Risk  Recent Flowsheet Documentation  Taken 3/16/2022 1600 by Viridiana Bales, RN  Activity Management:   activity encouraged   ambulated in room

## 2022-03-16 NOTE — PROGRESS NOTES
2025: Pitocin shut off and disconnected.     2106: Cook catheter placed by BRITTON Farley. 50cc vaginal and 50cc uterine. Pt tolerated well.

## 2022-03-16 NOTE — PROGRESS NOTES
Labor Progress Note:    Patient Name:  Melody Babcock  :      1990  MRN:      [id number]      RNs called CNM to come evaluate FHR strip at 0450 for recurrent late decels.  After CNM reviewed strip with RNs, paged [facility] Dr Rae to come and evaluate and make recommendation.      Subjective:  Melody delaney had been resting until recently when RNs had her change positions frequently for intrauterine resuscitation efforts.  Crampy but relatively comfortable.  Timoteo at bedside and supportive    Hasn't eaten since between  - .      Objective:  /63   Temp 98.2  F (36.8  C) (Oral)   Resp 18   LMP 2021 (Exact Date)   Breastfeeding No     FHR:     From 4823-3715:  Much of this time Category I with moderate variability and accels, with the exception of an episode of recurrent lates with 5-6 contractions between 5213-6667 which resolved.     From 410 - 0505: Baseline , minimal-moderate variability.  Late decels with the majority of contractions during this time frame (area of 15 min broken strip where difficult to get a good reading, so status unknown during that time, but lates before and after this broke strip.  Happened when RNs attempting multiple position changes).    As of the time of this note:  FHR variability seems to be improving.  May be cycling back to a more reassuring FHR, will continue to monitor.  Contractions: q 4-5 minutes, mild to palpation.  Cervix: exam deferred.  Cook cath still in place      Assessment:      IOL at 41.1 weeks due to non-reassuring FHR during post-dates monitoring yesterday.  Cook catheter in place for cervical ripening.  Plan had been for pitocin in AM.    CNM called to evaluate FHR strip again related to recurrent late decels not responding to RN intrauterine resuscitative efforts.  Consulted again with Dr Rae.  Dr Rae, CNM, RN to talk with Melody/Timoteo.      Decision made to proceed with primary  due to continued intermittent episodes of  Category II FHR, remote from delivery.  Please see Dr Rae's note for details.  Care transferred to Dr Rae.  CNM will remain in an emotionally supportive role.    To remove cook catheter.    RN/staff to prep for c-birth    This writer will be present with patient until 0600, when next CNM (Barbara Lyon) comes on.  At that time Barbara to take over emotionally supportive CNM role.        Total time spent with patient: 20 minutes, of which >50% time spent counseling and coordination of care.      Provider:  GAUTAM Florez/BRITTON        Date:  3/16/2022  Time:  5:12 AM

## 2022-03-16 NOTE — ANESTHESIA PREPROCEDURE EVALUATION
"Anesthesia Pre-Procedure Evaluation    Patient: Melody Babcock   MRN: [id number] : 1990        Procedure : Procedure(s):   SECTION          Past Medical History:   Diagnosis Date     Allergic urticaria      Migraine without aura and without status migrainosus, not intractable      Migraines      Urinary tract infection      Varicella     childhood      Past Surgical History:   Procedure Laterality Date     AS BIOPSY SKIN/SUBQ/MUC MEM, SINGLE LESION      mole removal     HC TOOTH EXTRACTION W/FORCEP        Allergies   Allergen Reactions     Sulfa Drugs      Vicodin [Acetaminophen] Other (See Comments) and Nausea and Vomiting     Pt states she had \"withdrawals\" and felt \"terrible.\"      Adhesive Tape Rash     Bacitracin Rash     Patient developed rash with use of bacitracin and had positive reaction with true test     Nickel Rash     Positive reaction with true test.       Social History     Tobacco Use     Smoking status: Never Smoker     Smokeless tobacco: Never Used   Substance Use Topics     Alcohol use: Not Currently     Alcohol/week: 0.0 standard drinks     Comment: Pregnant, so not drinking      Wt Readings from Last 1 Encounters:   03/15/22 92.6 kg (204 lb 3.2 oz)        Anesthesia Evaluation   Pt has had prior anesthetic. Type: MAC.    No history of anesthetic complications       ROS/MED HX  ENT/Pulmonary:  - neg pulmonary ROS     Neurologic:  - neg neurologic ROS     Cardiovascular:  - neg cardiovascular ROS     METS/Exercise Tolerance:     Hematologic:  - neg hematologic  ROS     Musculoskeletal:  - neg musculoskeletal ROS     GI/Hepatic:  - neg GI/hepatic ROS     Renal/Genitourinary:  - neg Renal ROS     Endo:  - neg endo ROS     Psychiatric/Substance Use:  - neg psychiatric ROS     Infectious Disease:  - neg infectious disease ROS     Malignancy:  - neg malignancy ROS     Other:      (+) Possibly pregnant, ,         Physical Exam    Airway  airway exam normal      Mallampati: I   TM " distance: > 3 FB   Neck ROM: full   Mouth opening: > 3 cm    Respiratory Devices and Support         Dental  no notable dental history         Cardiovascular   cardiovascular exam normal       Rhythm and rate: regular and normal     Pulmonary   pulmonary exam normal        breath sounds clear to auscultation           OUTSIDE LABS:  CBC:   Lab Results   Component Value Date    WBC 9.2 03/15/2022    WBC 7.2 08/27/2021    HGB 11.5 (L) 03/15/2022    HGB 11.9 02/11/2022    HCT 34.8 (L) 03/15/2022    HCT 33.7 (L) 08/27/2021     03/15/2022     08/27/2021     BMP: No results found for: NA, POTASSIUM, CHLORIDE, CO2, BUN, CR, GLC  COAGS: No results found for: PTT, INR, FIBR  POC:   Lab Results   Component Value Date    HCG Negative 09/16/2006     HEPATIC: No results found for: ALBUMIN, PROTTOTAL, ALT, AST, GGT, ALKPHOS, BILITOTAL, BILIDIRECT, LOREN  OTHER:   Lab Results   Component Value Date    A1C 5.1 08/27/2021    TSH 2.64 01/05/2015       Anesthesia Plan    ASA Status:  2, emergent       Anesthesia Type: Spinal.              Consents    Anesthesia Plan(s) and associated risks, benefits, and realistic alternatives discussed. Questions answered and patient/representative(s) expressed understanding.    - Discussed:     - Discussed with:  Patient, Spouse      - Extended Intubation/Ventilatory Support Discussed: No.      - Patient is DNR/DNI Status: No    Use of blood products discussed: No .     Postoperative Care    Pain management: Peripheral nerve block (Single Shot), intrathecal morphine.   PONV prophylaxis: Ondansetron (or other 5HT-3), Dexamethasone or Solumedrol     Comments:                Joseluis Juarez MD

## 2022-03-16 NOTE — PROGRESS NOTES
Provider called about 7 min deceleration with position changes and IV fluid bolus. Provider on her way to bedside

## 2022-03-16 NOTE — PROGRESS NOTES
Labor Progress Note:    Patient Name:  Melody Babcock  :      1990  MRN:      [id number]        Present with pt for duration of  section. Baby Girl Ibarra. Advised on supports and care of CNM role with all medical care now transferred to MDs.       Total time spent with patient: 60 minutes, >50% time spent counseling and coordination of care.      Provider:  Barbara Lyon DNP, APRN, CNM            Date:  3/16/2022  Time:  7:02 AM

## 2022-03-16 NOTE — PROGRESS NOTES
Labor Progress Note:    Patient Name:  Melody Babcock  :      1990  MRN:      [id number]        Subjective:  Melody has been resting until RN noted prolonged FHR decel (nothing prompted this episode).  Moved position several times, ultimately ending up in hands/knees position.  Positioning along with LR bolus helped resolve decel.      CNM called by RN to evaluate patient/FHR.      Objective:  /64   Temp 98.5  F (36.9  C) (Oral)   Resp 20   LMP 2021 (Exact Date)   Breastfeeding No     FHR:     In the past 30 min:  Baseline 140, mod variability, intermittent decels cara 110, including 1 prolonged decel to cara 90 X 5.5 minutes (some episodes of recover throughout).    Now:  Baseline 140 possibly changing to 130, mod variability, no accels, no decels    Since the time of my last note ~:  Much of strip is Category I.  Has episodes of category II with isolated variable decels or late decels (not repeating lates).   Contractions: q 1.5-7 minutes, mild to palpation.  Cervix: exam deferred at this time.  Intact membranes.  Cook catheter in place.  Other: patient had been relatively comfortable and resting      Assessment:      IOL at 41.1 weeks due to non-reassuring FHR during post-dates monitoring    CNM called to evaluate FHR strip related to prolonged variable decel, as well as history of intermittent decels (variable and late) inbetween episodes of Category I FHR    Cook catheter in place for cervical ripening.  Patient has been able to rest     Need to better assess I&O related to IV fluid boluses since admission.      Plan:     CNM consulted with Dr Rae again.  CNM/physician reviewed FHR strip of the last several hours together.  Dr Rae is recommending continued expectant management at this time as FHR continues to recover to Category I in-between these intermittent episodes of Category II FHR.  Dr Rae is recommending further physician consultation with repeating or deeper  variable decels, decreasing variability, or repeating late decels, or other maternal/fetal concerns.      Routine CNM support & management. Encourage position changes, rest as desired.    Plan is for reevaluation in AM, likely with start of pitocin.  Re-evaluation/further consultation sooner PRN.    CNM is requesting a urine hat be placed in toilet so RNs can adequately assess I&O.      Total time spent with patient: 20 minutes, of which >50% time spent counseling and coordination of care.      Provider:  GAUTAM Florez/BRITTON        Date:  3/16/2022  Time:  1:49 AM

## 2022-03-16 NOTE — PROGRESS NOTES
Providers at bedside discussing options for plan of care. Decision made and pt agrees to do contraction stress test. Will monitor and update as needed.

## 2022-03-16 NOTE — ANESTHESIA CARE TRANSFER NOTE
Patient: Melody Babcock    Procedure: Procedure(s):   SECTION       Diagnosis: Fetal distress affecting delivery [O77.9]  Diagnosis Additional Information: No value filed.    Anesthesia Type:   Spinal     Note:    Oropharynx: oropharynx clear of all foreign objects  Level of Consciousness: awake  Oxygen Supplementation: room air    Independent Airway: airway patency satisfactory and stable  Dentition: dentition unchanged  Vital Signs Stable: post-procedure vital signs reviewed and stable  Report to RN Given: handoff report given  Patient transferred to: Labor and Delivery    Handoff Report: Identifed the Patient, Identified the Reponsible Provider, Reviewed the pertinent medical history, Discussed the surgical course, Reviewed Intra-OP anesthesia mangement and issues during anesthesia, Set expectations for post-procedure period and Allowed opportunity for questions and acknowledgement of understanding      Vitals:  Vitals Value Taken Time   /61 22 0711   Temp 36.8  C (98.2  F) 22 0711   Pulse 62 22 0711   Resp 20 22 0711   SpO2 99 % 22 0711       Electronically Signed By: GAUTAM MCKINNEY CRNA  2022  7:12 AM

## 2022-03-16 NOTE — PROGRESS NOTES
0150: BRITTON Farley on unit assessing FHR. Discussed plan with Dr. Rae. Plan is to continue to watch FHR and intervene as needed. Pt is aware of plan and agrees.

## 2022-03-16 NOTE — PLAN OF CARE
Patient denies pain; scheduled Tylenol and Tordal given. Bleeding scant and VSS. Incision dressing CDI. Up to bathroom x2, unable to void. DTV at 1530. NV x1, zofran given. FOB at bedside and helpful with  cares.

## 2022-03-16 NOTE — ANESTHESIA PROCEDURE NOTES
Intrathecal injection Procedure Note    Pre-Procedure   Staff -        Anesthesiologist:  Joseluis Juarez MD       Performed By: anesthesiologist       Location: OB       Procedure Start/Stop Times: 3/16/2022 5:45 AM and 3/16/2022 5:48 AM       Pre-Anesthestic Checklist: patient identified, IV checked, risks and benefits discussed, informed consent, monitors and equipment checked, pre-op evaluation, at physician/surgeon's request and post-op pain management  Timeout:       Correct Patient: Yes        Correct Procedure: Yes        Correct Site: Yes        Correct Position: Yes   Procedure Documentation  Procedure: intrathecal injection       Patient Position: sitting       Skin prep: Betadine       Insertion Site: L2-3. (midline approach).       Needle Gauge: 24.        Needle Length (Inches): 3.5        Spinal Needle Type: Pencan       Introducer used       Introducer: 20 G       # of attempts: 1 and  # of redirects:  0    Assessment/Narrative         CSF fluid: clear.    Medication(s) Administered   0.75% Hyperbaric Bupivacaine (Intrathecal), 1.4 mL  Fentanyl PF (Intrathecal), 15 mcg  Morphine PF 1 mg/mL (Intrathecal), 0.15 mg  Medication Administration Time: 3/16/2022 5:48 AM

## 2022-03-16 NOTE — PROGRESS NOTES
"Labor Progress Note:    Patient Name:  Melody Babcock  :      1990  MRN:      [id number]      Contraction stress test completed and ultimately \"passed\".  Did have decels in one 10 minute segment (including a few late decels), but then FHR immediately following that was Category I.  Dr Rae re-discussed options with patient/partner.  As stress test was primarily Category I, decision made to proceed with cervical ripening.  Dr Rae is recommending cook catheter alone (NO concurrent low-dose pitocin with cook).  Continuous FHR monitoring through night.  Can eat a regular diet if FHR stable after cook catheter placement, and will continue to re-evaluate this status through the night and tomorrow.  Then hopefully can proceed with pitocin tomorrow AM    If fetal status changes throughout cervical ripening or with pitocin tomorrow, plan may change to a c-birth.  Couple ok with this.       Objective:  /80   Temp 98.2  F (36.8  C) (Oral)   Resp 18   LMP 2021 (Exact Date)   Breastfeeding No     FHR: 140, mod variability, accels present, no decels.   Contractions: tachysystole with rolling contractions post cook placement minutes.  Now q 2-5, mild post maternal position change and IV fluid bolus.  Cervix: 1, 50%, -2, very anterior (cervical opening under pubic bone), moderate consistency, cephalic.  Procedure:  Cook catheter placed with the assistance speculum/ring forceps and cook catheter introducer.  Difficult placement due to angle of cervix, but successfully placed and patient very tolerant of procedure.  50 ml fluid placed in uterine balloon/50 ml of fluid placed in vaginal balloon.  Patient very crampy in response.  Cook tubing taped to leg      Assessment:      IOL at 41.1 weeks due to non-reassuring FHR during post-dates monitoring    Category I FHTs now (history of Category II on/off since arrival)    Cook catheter for cervical ripening.      Uterine tachysystole post cook placement, " "improved with maternal side-lying positioning and fluid bolus.    Uterine cramping causing mild maternal discomfort.        Plan:     Cervical ripening:  Cook catheter for cervical ripening through the night.  To be removed ~0900 tomorrow or sooner PRN.  Will do cook catheter alone (NO pitocin in addition due to history of non-reassuring FHR earlier).    Sleep:  May have sleep meds PRN.  Patient reports a \"bad reaction\" to Vicodin in the past when took for a back injury (felt unwell on it, and then felt like was going through withdrawal as came off it).  Doesn't sound to be a true \"allergy\", yet patient says wouldn't take Morphine for sleep related to this history and therefore it wasn't ordered.  Therefore hydroxyzine ordered for sleep PRN.    Diet:  Will put new order to move patient from NPO to a regular diet.  Can eat a light supper now.  Depending on how FHR looks through the night, may be able to eat breakfast in the AM too.  Dr Rae says that re-evaluation of this should be done again in the AM and that clear liquids may be warranted once pitocin started.    Overall plan:  Routine CNM support & management.  Physician consultation available PRN.  Plan is to proceed with pitocin IOL in AM as appropriate.        Total time spent with patient: 90 minutes, of which >50% time spent counseling and coordination of care.      Provider:  GAUTAM Florez/CNM        Date:  3/15/2022  Time:  9:31 PM        "

## 2022-03-16 NOTE — OP NOTE
NAME:  Melody Babcock     RECORD # [id number]     [facility] # [id number]    DATE OF SERVICE: 3/16/2022     PREOPERATIVE DIAGNOSIS:   1. IUP at 41 /  2. Persistent category II FHT remote from delivery    POSTOPERATIVE DIAGNOSIS: same s/p pLTCS    PROCEDURE: Low transverse  section      SURGEON:  Ashley Rae MD     ASSISTANT: Resident    ANESTHESIA: spinal    FINDINGS: Normal uterus, tubes and ovaries bilateral. Normal appearance to the adnexae.  Live female infant born.    SPECIMENS: Placenta to pathology    ESTIMATED BLOOD LOSS: Delivery QBL (mL): 879     DRAINS: Morrow catheter.    COMPLICATIONS: None    DISPOSITION: Stable to recovery.    INDICATION AND CONSENT: Melody is a 32 year old G1 who was seen yesterday in clinic for her routine CNM visit, BPP done for late-term gestation and an NST was done in the office.  BPP ws  but she had prolonged deceleration during NST and was sent to triage for further monitoring.  In triage she had periods of category II FHT and periods of category I FHT, she was admitted for delivery.  Oxytocin started to initiate CST prior to induction, during initial 10 minutes of CST she did have a positive test with 3 out of 4 contractions in 10 minutes, however CNM and I were discussing result FHT improved, Next 20-30 minutes showed negative CST.  With negative CST we discussed option of attempting induction, cook catheter placed for ripening.  Overall patient had periods of category I tracing but also periods of category II.  From 2006-2781 FHT showed recurrent late decelerations, moderate variability but remote from delivery and without any medications for induction on board.  At this point recommended primary .  The risks of the procedure were discussed with the patient including but not exclusive to bleeding and a risk of transfusion, infection, and damage to nearby organs, including the bladder, ureter and bowel.    PROCEDURE:  After obtaining informed consent,  the patient was taken to the operating room in stable condition.  After induction of a spinal anesthetic, fetal heart tones were checked and were stable.  She was prepped and draped in the usual sterile fashion and positioned in the dorsal supine position.   A timeout was then performed.      A pfannensteil skin incision was made to the level of the fascia.  The fascia was incised in the midline and extended laterally using camarillo scissors. Kocher clamps were applied to the superior aspect of the fascial incision and the underlying rectus muscles were dissected using blunt and sharp dissection with the camarillo scissors.  The kocher clamps were then reapplied to the inferior  aspect of the fascial incision which was similarly sharply dissected from the rectus muscles.  The rectus muscles were  bluntly in the midline.  The peritoneum was identified and entered bluntly with the surgeons finger.  The peritoneal incision was extended laterally using manual traction.  The Marques-O retractor was placed in the incision.      The bladder was identified and was noted to be away from the intended hysterotomy.  The vesicouterine peritoneum was identified, grasped with pickups and incised with metzenbaum scissors, the bladder flap was created bluntly with the surgeon's finger.  A low transverse incision was created sharply with the scalpel and extended using cephalocaudal traction.  The fetal head was delivered.  The fetus was fully delivered and the cord was clamped and cut.  The  was handed off to the awaiting pediatric team.  The placenta was manually and completely delivered intact with a 3 vessel cord.  The uterus was cleared of all clots and debris.  The uterine incision was closed with 0-vicryl in a running locked fashion.  0-monocryl was used to create a second imbricating layer.  The uterine incision was inspected and hemostatic.  The pericolic gutters were cleared of all clots and debris.  The uterine  incision was again inspected and noted to be hemostatic.  The Marques O-ring retractor was then removed.    The fascia was reapproximated in a running stitch of 0-vicryl.  The subcutaneous tissues were brought together with a running stitch of 3-0 plain.  The skin was closed with 4-0 monocryl in a subcuticular fashion.  The incision was dressed with exophin.  Patient tolerated this procedure well.  Sponge, lap and needle counts were correct x two.  The patient was taken to the recovery room in stable condition.      Ashley Rae MD

## 2022-03-17 PROBLEM — Z98.891 STATUS POST PRIMARY LOW TRANSVERSE CESAREAN SECTION: Status: ACTIVE | Noted: 2022-03-17

## 2022-03-17 PROBLEM — O36.8390 VARIABLE FETAL HEART RATE DECELERATIONS, ANTEPARTUM: Status: RESOLVED | Noted: 2022-03-15 | Resolved: 2022-03-17

## 2022-03-17 PROBLEM — Z36.89 ENCOUNTER FOR TRIAGE IN PREGNANT PATIENT: Status: RESOLVED | Noted: 2022-03-15 | Resolved: 2022-03-17

## 2022-03-17 LAB
HGB BLD-MCNC: 9.4 G/DL (ref 11.7–15.7)
PATH REPORT.COMMENTS IMP SPEC: NORMAL
PATH REPORT.FINAL DX SPEC: NORMAL
PATH REPORT.GROSS SPEC: NORMAL
PATH REPORT.MICROSCOPIC SPEC OTHER STN: NORMAL
PATH REPORT.RELEVANT HX SPEC: NORMAL
PHOTO IMAGE: NORMAL

## 2022-03-17 PROCEDURE — 250N000011 HC RX IP 250 OP 636: Performed by: STUDENT IN AN ORGANIZED HEALTH CARE EDUCATION/TRAINING PROGRAM

## 2022-03-17 PROCEDURE — 85018 HEMOGLOBIN: CPT | Performed by: STUDENT IN AN ORGANIZED HEALTH CARE EDUCATION/TRAINING PROGRAM

## 2022-03-17 PROCEDURE — 120N000001 HC R&B MED SURG/OB

## 2022-03-17 PROCEDURE — 88307 TISSUE EXAM BY PATHOLOGIST: CPT | Mod: 26 | Performed by: PATHOLOGY

## 2022-03-17 PROCEDURE — 36415 COLL VENOUS BLD VENIPUNCTURE: CPT | Performed by: STUDENT IN AN ORGANIZED HEALTH CARE EDUCATION/TRAINING PROGRAM

## 2022-03-17 PROCEDURE — 250N000013 HC RX MED GY IP 250 OP 250 PS 637: Performed by: STUDENT IN AN ORGANIZED HEALTH CARE EDUCATION/TRAINING PROGRAM

## 2022-03-17 RX ADMIN — KETOROLAC TROMETHAMINE 30 MG: 30 INJECTION, SOLUTION INTRAMUSCULAR; INTRAVENOUS at 01:52

## 2022-03-17 RX ADMIN — SIMETHICONE CHEW TAB 80 MG 80 MG: 80 TABLET ORAL at 12:01

## 2022-03-17 RX ADMIN — ACETAMINOPHEN 975 MG: 325 TABLET ORAL at 01:50

## 2022-03-17 RX ADMIN — SIMETHICONE CHEW TAB 80 MG 80 MG: 80 TABLET ORAL at 15:23

## 2022-03-17 RX ADMIN — SENNOSIDES AND DOCUSATE SODIUM 1 TABLET: 50; 8.6 TABLET ORAL at 21:36

## 2022-03-17 RX ADMIN — ACETAMINOPHEN 975 MG: 325 TABLET ORAL at 15:22

## 2022-03-17 RX ADMIN — IBUPROFEN 800 MG: 800 TABLET ORAL at 15:21

## 2022-03-17 RX ADMIN — IBUPROFEN 800 MG: 800 TABLET ORAL at 21:36

## 2022-03-17 RX ADMIN — SIMETHICONE CHEW TAB 80 MG 80 MG: 80 TABLET ORAL at 21:37

## 2022-03-17 RX ADMIN — IBUPROFEN 800 MG: 800 TABLET ORAL at 08:06

## 2022-03-17 RX ADMIN — ACETAMINOPHEN 975 MG: 325 TABLET ORAL at 08:06

## 2022-03-17 RX ADMIN — SENNOSIDES AND DOCUSATE SODIUM 1 TABLET: 50; 8.6 TABLET ORAL at 08:06

## 2022-03-17 RX ADMIN — ACETAMINOPHEN 975 MG: 325 TABLET ORAL at 21:36

## 2022-03-17 ASSESSMENT — ACTIVITIES OF DAILY LIVING (ADL)
ADLS_ACUITY_SCORE: 3

## 2022-03-17 NOTE — PLAN OF CARE
Problem: Plan of Care - These are the overarching goals to be used throughout the patient stay.    Goal: Plan of Care Review/Shift Note  Description: The Plan of Care Review/Shift note should be completed every shift.  The Outcome Evaluation is a brief statement about your assessment that the patient is improving, declining, or no change.  This information will be displayed automatically on your shift note.  Outcome: Ongoing, Progressing  Flowsheets (Taken 3/17/2022 2140)  Plan of Care Reviewed With: patient  Outcome Evaluation: A&O X4. Reports pain between a 1-2 and feels it is very well controlled. Receiving scheduled tylenol and ibuprofen which she reports is helpful. Notes some small clots with urination intermittently, denies saturating pads in an hour or less. Ambulating occassionally without complication. VSS for duration of shift. Will continue to monitor.  Overall Patient Progress: improving

## 2022-03-17 NOTE — PLAN OF CARE
Problem: Pain (Postpartum  Delivery)  Goal: Acceptable Pain Control  Intervention: Prevent or Manage Pain  Recent Flowsheet Documentation  Taken 3/17/2022 1645 by Donna Fenton RN  Pain Management Interventions:   medication (see MAR)   ambulation/increased activity  Pain controlled with scheduled and PRN meds. Patient encouraged to ambulate in hallways to help relieve shoulder strap pain, prn simethicone given x2 this shift with some relief .

## 2022-03-17 NOTE — PROGRESS NOTES
"[facility] Courtesy Round:    Patient Name:  Melody Babcock  :      1990  MRN:      [id number]    Assessment:   Day 1 postpartum, s/p  for Cat II FHTs remote from delivery .  [facility] courtesy round.    Plan:    -New mom handout reviewed and added to discharge paperwork. Discussed breast care, pain management, breastfeeding initiation, bowel changes, return of fertility, sleep changes and required support.     -Reviewed recommendations for pregnancy spacing s/p C/S delivery    -Plan for today: encouraged rest, skin-to-skin, breastfeeding on cue, adequate pain control and limiting visitors.     -Physician management with CNM support as needed.        Subjective:  Integrating birth experience. Pleased with her care. The patient is voiding and ambulating without difficulty. Tolerating normal diet and passing flatus. No BM yet. Bleeding is WNL. Pain is well controlled with current medications. Has been up out bed several times.  IV and daniel have been d/c'd.  Baby is breastfeeding on cue. Breastfeeding with the assistance of the nursing staff. \"Each feeding seems to get better and better\"   Hoping to see lactation today.  Postpartum contraception plans include TURNER, NFP, condoms.    Objective:  No exam. Courtesy round only.          Provider:  @[email or web address]@    Date:  3/17/2022  Time:  9:27 AM    "

## 2022-03-17 NOTE — LACTATION NOTE
This note was copied from a baby's chart.  Met with Melody to assist with breast feeding.  This is Melody's first baby, baby has been latching well, denies nipple pain, weight loss 4% at 24 hours, voiding and stooling in good amounts and content after feedings.  During visit, baby was in football hold on the right breast, with assistance baby latched.  Latch was comfortable, had to do breast compression to keep her interested, once milk let down, lots of audible swallows noted.  Baby got fidgety and turning to her left hand pulled off the breast and hand in mouth.  She didn't want to latch again, burped her and switched to the left breast in football hold, she immediately latched and fed with lots of swallows for 10 minutes.  Parents said she likes football on the left breast better, suggested trying cross cradle on the right breast since she likes that position and her left hand is hidden from her sight.  Discussed feeding her 8 times in 24 hours, offering both breasts every feeding and output goals for days of life.  She has a medela breast pump for use at home and knows how to use it.  We also reviewed lactation resources in education folder and breastfeeding essentials book.  Will follow up as needed.

## 2022-03-18 VITALS
RESPIRATION RATE: 18 BRPM | TEMPERATURE: 98 F | DIASTOLIC BLOOD PRESSURE: 62 MMHG | OXYGEN SATURATION: 99 % | SYSTOLIC BLOOD PRESSURE: 129 MMHG | HEART RATE: 90 BPM

## 2022-03-18 PROCEDURE — 250N000013 HC RX MED GY IP 250 OP 250 PS 637: Performed by: STUDENT IN AN ORGANIZED HEALTH CARE EDUCATION/TRAINING PROGRAM

## 2022-03-18 RX ORDER — IBUPROFEN 600 MG/1
600 TABLET, FILM COATED ORAL
Qty: 30 TABLET | Refills: 1 | Status: SHIPPED | OUTPATIENT
Start: 2022-03-18 | End: 2022-04-28

## 2022-03-18 RX ORDER — PNV NO.95/FERROUS FUM/FOLIC AC 28MG-0.8MG
1 TABLET ORAL DAILY
Qty: 30 TABLET | Refills: 1 | Status: SHIPPED | OUTPATIENT
Start: 2022-03-18 | End: 2023-09-18

## 2022-03-18 RX ADMIN — ACETAMINOPHEN 975 MG: 325 TABLET ORAL at 03:48

## 2022-03-18 RX ADMIN — Medication: at 02:11

## 2022-03-18 RX ADMIN — ACETAMINOPHEN 975 MG: 325 TABLET ORAL at 09:07

## 2022-03-18 RX ADMIN — IBUPROFEN 800 MG: 800 TABLET ORAL at 03:48

## 2022-03-18 RX ADMIN — IBUPROFEN 800 MG: 800 TABLET ORAL at 09:07

## 2022-03-18 RX ADMIN — SIMETHICONE CHEW TAB 80 MG 80 MG: 80 TABLET ORAL at 03:48

## 2022-03-18 ASSESSMENT — ACTIVITIES OF DAILY LIVING (ADL)
ADLS_ACUITY_SCORE: 3

## 2022-03-18 NOTE — CONSULTS
Integrative Therapy Consult    Healing PresenceYes  Essential Oils: Topical (EO/Topical Oil)     Neetu -  HC, Tea Tree - HC, Lavender Massage Oil - HC       Healing Music:       Breathwork:       Guided Imagery:       Acupressure:       Oshibori:       Energy Therapy:       Healing Touch:       Reiki:       Qi Gong:     Massage: Foot      Targeted Massage:    Sleep Promotion:       Other Therapy:       Intervention Reason: Edema     Pre and Post Session Scores: Patient Desires Treatment: yes                             Delivery:         Referrals:      Lisa Fiore

## 2022-03-18 NOTE — DISCHARGE INSTRUCTIONS
New Mother Care    Postpartum Appointment:  You should have an appointment at two weeks with the physician who performed your surgery, and six weeks with the midwives. Call [phone] to schedule this appointment.     Lactation Appointment with CNM:   If you would like to make a clinic appointment for breastfeeding support with one of the Certified Nurse-Midwives who is also an International Board Certified Lactation Consultant, please call [phone].     Postpartum Changes:  You have experienced many physical and emotional changes during your pregnancy.  After delivery, you will notice other changes.  Here are some tips for common experiences after your baby is born.      Sign/Symptom Cause Suggestions   Mood Swings Hormonal changes, stress, fatigue Rest.  Ask for help.  Contact your health care provider if sadness continues more than two weeks, or caring for baby becomes overwhelming.   Engorged Breasts Swelling with more fluid and breast milk production two to five days after delivery.  May occur whether or not you are breastfeeding. Heat or ice for comfort.  If breastfeeding, nurse frequently.  Use supportive bra without underwire.  Should improve in one to two days.   After pains/ Cramping Cramping of uterus, often with nursing which stimulates the uterus to tighten.  Stronger with subsequent babies (second or more). Use non-aspirin pain reliever (ibuprofen or acetaminophen), especially before breastfeeding.  Empty your bladder frequently (at least every 2 hours).   Increased vaginal bleeding Too much physical activity; breastfeeding (due to uterine contractions). Rest more.  Avoid use of tampons.  Redness and amount of vaginal discharge (bleeding) decreases by three to four weeks after delivery.  Call clinic if you fill more than one pad within two hours.   Perineal discomfort and hemorrhoids Swelling from delivery; episiotomy or laceration (tearing); stitches. Ice, non-asprin pain reliever, witch hazel  "pads, warm tub soaks, stool softener, high fiber diet, kegel exercises. Stitches are absorbed.  Healing in two to three weeks. Do NOT use \"doughnut\" pillow for sitting.   Increased sweating and urinating Body losing extra fluid from pregnancy; hormonal shifts. Empy bladder more often, especially before breastfeeding; increase water intake; improves within three to four days.   Constipation Change in abdominal pressure and swelling after delivery; hormonal changes. Increase fluids and fiber in diet; Metamucil or stool softner as needed.   Skin coloring  Hair Thickness  Swelling Skin darkening and pregnancy rash due to hormonal changes; extra hair growth during pregnancy; increased fluids from pregnancy and birth causes swelling. Darker skin coloring and stretch marks with fade after delivery (no treatment needed).  Extra hair will be lost in two to four months.  Pregnancy swelling resolves in one to four weeks. Continue to hydrate.   Sciatica pain Nerve irritation due to extra weight and pressure during pregnancy. May continue after delivery; heat, acetaminophen, ibuprofen, position changes for comfort.     Postpartum Exercises  These exercises may be started soon after delivery.  If you feel tired or uncomfortable, stop and try these exercises after resting.  Check for any separation in your stomach wall before doing exercises that involve twising or stress on your stomach muscles.  Place your fingers in the center of your upper abdomen and lift your head and shoulders up in a partial sit-up.  If you feel a separation in your muscle wall, it is too soon to do sit ups.  Try the following instead:    Tighten and relax your pelvic floor muscles often.    Breathe deeply while laying on your back.  As you breathe out, lift just your head up and pull the sides of your stomach toward the middle with your hands.  Then breathe in as you put your head down.  This will help to close the separation of your stomach.    Tilt your " pelvis back and forth.  Alternate this with full body stretches.    Move your feet back and forth, then in circular motions.    While lying on your back, slide your heels toward your hips and bend your knees one at a time, then together.    While lying flat on the floor, bend your knees and raise your legs one at a time.    When the stomach wall separation has closed, you can progress to straight curl-ups, diagonal curl-ups, and side leg lifts.    After a  Birth  In addition to the instruction after a vaginal delivery, follow these guidelines:    Check your incision each day for signs of infection: redness, drainage, warmth or discomfort.  Contact your midwife or OB who performed your surgery if you have any of these signs or heavy vaginal bleeding.    Check with your midwife or surgeon before taking tub baths.    You may start driving a car two weeks after delivery.    Gradually increase your physical activity and exercise level according to how comfortable or tired you feel.    During the first days after delivery, try deep breathing, bending and stretching your feet in up-and-down circular motions, extending and tightening your legs while crossed at the ankles, and doing isometric exercises while lying down.    Next, try pelvic lifts with bent knees, bending and straightening your knees separately and together.    After checking for the abdominal rectus (stomach wall) separation, advance to back arching, twisting to each side, and reaching for your knees with pillow support.  Straight curl-ups, diagonal curl-ups and side leg lifts can then be added.    Reminders    Healthy nutrition is still important for your recovery and breastfeeding.  Continue your prenatal vitamins if you are breastfeeding.    It is important for your health and your baby's health to stay smoke-free.  Babies exposed to smoke are sick more often.    Family planning is important.  Discuss birth control options with your  provider.    Warning Signs  Call the on-call midwife if you have:    Heavy bleeding (filling one pad within one to two hours).    Blood clots larger than the size of a golf ball, especially with heavy bleeding.    Vaginal discharge with foul odor or green color.    Fever (oral temperature over 100.3 F).    Signs of bladder infection (burning, frequent urination)    Signs of vaginal infection (vaginal itching, irritation)    Painful, hard lump in breast and/or red streaks with fever.    Vaginal pain or tissue coming out of vagina.    Abdominal pain or abdomen tender to the touch.    Signs of depression or anxiety, affecting sleep or activities of daily living.    If you have questions or concerns and need to speak with a midwife immediately, please call the on-call midwife at [phone].    If you have a non-emergent question or would like to schedule a follow up appointment, please call the clinic midwife at [phone].    Thank you for sharing your birth experience with the [facility].  Congratulations on the birth of your baby!      Postop  Birth Instructions    Activity       Do not lift more than 10 pounds for 6 weeks after surgery.  Ask family and friends for help when you need it.    No driving until you have stopped taking your pain medications (usually two weeks after surgery).    No heavy exercise or activity for 6 weeks.  Don't do anything that will put a strain on your surgery site.    Don't strain when using the toilet.  Your care team may prescribe a stool softener if you have problems with your bowel movements.     To care for your incision:       Keep the incision clean and dry.    Do not soak your incision in water. No swimming or hot tubs until it has fully healed. You may soak in the bathtub if the water level is below your incision.    Do not use peroxide, gel, cream, lotion, or ointment on your incision.    Adjust your clothes to avoid pressure on your surgery site  (check the elastic in your underwear for example).     You may see a small amount of clear or pink drainage and this is normal.  Check with your health care provider:       If the drainage increases or has an odor.    If the incision reddens, you have swelling, or develop a rash.    If you have increased pain and the medicine we prescribed doesn't help.    If you have a fever above 100.4 F (38 C) with or without chills when placing thermometer under your tongue.   The area around your incision (surgery wound), will feel numb.  This is normal. The numbness should go away in less than a year.     Keep your hands clean:  Always wash your hands before touching your incision (surgery wound). This helps reduce your risk of infection. If your hands aren't dirty, you may use an alcohol hand-rub to clean your hands. Keep your nails clean and short.    Call your healthcare provider if you have any of these symptoms:       You soak a sanitary pad with blood within 1 hour, or you see blood clots larger than a golf ball.    Bleeding that lasts more than 6 weeks.    Vaginal discharge that smells bad.    Severe pain, cramping or tenderness in your lower belly area.    A need to urinate more frequently (use the toilet more often), more urgently (use the toilet very quickly), or it burns when you urinate.    Nausea and vomiting.    Redness, swelling or pain around a vein in your leg.    Problems breastfeeding or a red or painful area on your breast.    Chest pain and cough or are gasping for air.    Problems with coping with sadness, anxiety or depression. If you have concerns about hurting yourself or the baby, call your provider immediately.      You have questions or concerns after you return home.

## 2022-03-18 NOTE — PROGRESS NOTES
Doing well. Eating, voiding and having BMs  Afeb  Abs soft  Inc CDI  Hgb 9.4  POD 2-Plan Home today.

## 2022-03-18 NOTE — PLAN OF CARE
Discharge paperwork and teaching with patient has been completed. RN gave adequate time to answer questions and patient verbally stated they understand information.     Melina Vidales RN

## 2022-03-18 NOTE — PROGRESS NOTES
[facility] Courtesy Round:    Patient Name:  Melody Babcock  :      1990  MRN:      [id number]    Assessment:   Day 2 postpartum, s/p  for fetal distress  Lactating Mother  [facility] courtesy round    Plan:   -Discharge today per MD. Encouraged rest, activity as tolerated, breastfeeding on cue, adequate pain control, use of belly band/wrap for comfort at home as well.   -Advised on outpatient LC support -through our midwifery group as well.  -Discussed that patient is under physician management with CNM support as needed. Plans 2 week visit with MD and 6 week PP visit with CNMs.    Subjective:  Integrating birth experience. Feeling ready to go home today. Pain is controlled with current medications. Baby is breastfeeding. Breastfeeding with the assistance of the nursing staff. Reports LC has been helpful and feedings are improving. Just finished a feeding - 15 min on each side. Support at home identified as adequate feels well supported with family nearby. Patient will have 14 weeks off from work. [place] taking 2 weeks off.    Objective:  No exam. Courtesy round only.     Provider:  Natali Erickson CNM  Date:  3/18/2022  Time:  10:11 AM

## 2022-03-18 NOTE — DISCHARGE SUMMARY
[facility]    Discharge Summary  Obstetrics    Date of Admission:  3/15/2022  Date of Discharge:  3/18/2022  Discharging Provider: Denny Bach    Discharge Diagnoses   Fetal distress affecting delivery [O77.9]  Acute Blood loss anemia-clinically stable    Procedure/Surgery Information   Procedure: Procedure(s):   SECTION   Surgeon(s): Surgeon(s) and Role:     * Ashley Rae MD - Primary     * Ana M Muñoz MD - Resident - Assisting     History of Present Illness   Melody Babcock is a 32 year old female who had a c section for nonreassuring FHTs.     Hospital Course   The patient's hospital course was unremarkable.  She recovered as anticipated and experienced no post-operative complications.  On discharge, her pain was well controlled. Vaginal bleeding is similar to peak menstrual flow.  Voiding without difficulty.  Ambulating well and tolerating a normal diet.  No fever or significant wound drainage.  Breastfeeding well.  Infant is stable.  She was discharged on post-partum day #2..    Post-partum hemoglobin:   Hemoglobin   Date Value Ref Range Status   2022 9.4 (L) 11.7 - 15.7 g/dL Final   2010 12.4 11.7 - 15.7 g/dL Final       [place] Depression Scale  Thoughts of Harming Self:    Total Score:        Denny Bach MD    Discharge Disposition   Discharged to home   Condition at discharge: Stable      Primary Care Physician   DUDLEY LEVY    Consultations This Hospital Stay   OB GYN IP CONSULT  LACTATION IP CONSULT  CARE MANAGEMENT / SOCIAL WORK IP CONSULT    Discharge Orders      Activity    Activity as tolerated     Reason for your hospital stay    Maternity care     Follow Up    Follow up with provider in 2 weeks and 6 weeks for post-delivery checks     After your  Section    Check your incision each day for signs of infection: redness, drainage, warmth, or discomfort. Contact your provider if you have any of these signs  or heavy vaginal bleeding.     Do NOT lift anything more than the baby or what you can lift with one hand such as a jug of milk. Avoid lifting with 2 hands and putting strain on your incision.     Check with your provider before taking tub baths.     It is OK to take showers, you may clean your incision with Hibiclens or soapy water and pat it dry. If you have white tapes (steri-strips) leave them on until they fall off.     You may start driving a car two weeks after delivery. You need to be off narcotic pain meds, be able to turn around and back-up the car, and slam on the brakes safely.     Gradually increase your physical activity and exercise level according to how comfortable or tired you feel. Walking and stairs are ok.     You should have an appointment with your surgeon in 2 weeks for an incision check     Breast pump    Breast Pump Documentation:  Manual/Electric Pump: To support adequate breast milk production and nutrition for infant.     I, the undersigned, certify that the above prescribed supplies are medically necessary for this patient and is both reasonable and necessary in reference to accepted standards of medical and necessary in reference to accepted standards of medical practice in the treatment of this patient's condition and is not prescribed as a convenience.     Diet    Resume previous diet     Discharge Medications   Current Discharge Medication List      START taking these medications    Details   ibuprofen (ADVIL/MOTRIN) 600 MG tablet Take 1 tablet (600 mg) by mouth once as needed for other (For mild to moderate pain.)  Qty: 30 tablet, Refills: 1    Associated Diagnoses: S/P  section         CONTINUE these medications which have NOT CHANGED    Details   Docosahexaenoic Acid (PRENATAL DHA) 200 MG capsule       Ferrous Sulfate (IRON) 325 (65 Fe) MG tablet Take 1 tablet by mouth daily      Prenatal Vit-Fe Fumarate-FA (PRENATAL PO)            Allergies   Allergies   Allergen  "Reactions     Sulfa Drugs      Vicodin [Acetaminophen] Other (See Comments) and Nausea and Vomiting     Pt states she had \"withdrawals\" and felt \"terrible.\"      Adhesive Tape Rash     Bacitracin Rash     Patient developed rash with use of bacitracin and had positive reaction with true test     Nickel Rash     Positive reaction with true test.      "

## 2022-03-18 NOTE — PLAN OF CARE
"  Problem: Plan of Care - These are the overarching goals to be used throughout the patient stay.    Goal: Readiness for Transition of Care  Outcome: Ongoing, Progressing     Patient VSS. Pain is well managed with ibuprofen and tylenol. Patient reports pain at a \"1.5-2\". Patient is still experiencing gas pains, ambulation was encouraged. Simethicone given, see MAR, with some relief. Patient had loose stool. Hold Senna for morning. Supportive spouse at bedside.   "

## 2022-04-21 ENCOUNTER — MEDICAL CORRESPONDENCE (OUTPATIENT)
Dept: HEALTH INFORMATION MANAGEMENT | Facility: CLINIC | Age: 32
End: 2022-04-21
Payer: COMMERCIAL

## 2022-04-25 NOTE — PROGRESS NOTES
Routine 6 week Postpartum Visit  Assessment:   Normal postpartum exam at ~6 weeks postpartum.   S/p primary CS for fetal intolerance remote from delivery, uncomplicated recovery  breastfeeding  negative depression screening   Acute blood loss anemia    Plan:    1. Labs: hgb check. Advised to taper or stop oral supplementation to improve hemorrhoids if hgb returned to normal range. Pap smear not indicated at today's visit. Next due for cervical cancer screening August 2026 . Due for annual Gyn exam in April 2023.  2. Desired contraception: condoms. Aware of recommendation for at least 1 year between pregnancies and had success with family planning with condoms in the past  3. Discussed resumption of exercise and setting a goal to attain ideal weight for height in the next 6-12 months.   4.  Resumption of intercourse reviewed with possible changes in libido and vaginal lubrication while nursing.  5.  Nutrition and supplements reviewed.  Advised continuation of a prenatal or multivitamin, also Vitamin D3 5000 IU geltab daily and an omega 3 fatty acid supplement.  6. Adjustment to parenting, self care and open communication with her support system discussed. Warning signs and symptoms related to postpartum mood disorders reviewed.     Subjective:   Melody Babcock is a 32 year old female who presents for postpartum visit. She is 6 weeks postpartum following a Primary LTCS for fetal intolerance remote from delivery, fetal intolerance prior to induction, cook catheter placed but did not complete 12 hours before decision to proceed with CS.  I have fully reviewed the prenatal and intrapartum course. The delivery was at Term and 41 weeks gestation Her baby girl is named Fred and weighed 6 lbs 15 oz at birth.     Postpartum course has been stable. Baby Fred's course has been stable. Baby is feeding by exclusively at breast. Lochia ceased at 4 weeks postpartum.  Bowel function is complicated by hemorrhoids. Bladder  "function is normal. She has not resumed intercourse. Desired contraception: condoms. [place] postpartum depression screening score: 0, negative #10, no concerns. She has resumed regular exercise. Melody Babcock returns to work in 6 weeks.    Review of Systems  General:  Denies problem  Eyes: Denies problem  Ears/Nose/Throat: Denies problem  Cardiovascular: Denies problem  Respiratory:  Denies problem  Gastrointestinal:  As above in HPI, Genitourinary: As above in HPI  Musculoskeletal:  Denies problem  Skin: Denies problem  Neurologic: Denies problem  Psychiatric: As above in HPI  Endocrine: Denies problem    Objective:      /74 (BP Location: Left arm, Patient Position: Sitting, Cuff Size: Adult Regular)   Pulse 64   Resp 16   Ht 1.613 m (5' 3.5\")   Wt 84.8 kg (187 lb)   LMP 06/08/2021 (Exact Date)   Breastfeeding Yes   BMI 32.61 kg/m        Physical Exam:  General Appearance: Alert, cooperative, no distress, appears stated age  Skin: Skin color, texture, turgor normal, no rashes or lesions  Throat: Lips, mucosa, and tongue normal; teeth and gums normal  HEENT: grossly normal; otoscopic and opthalmic exam not performed.   Neck: Supple, symmetrical, trachea midline, no adenopathy;  thyroid: not enlarged, symmetric, no tenderness/mass/nodules  Lungs: Clear to auscultation bilaterally, respirations unlabored  Breasts: Deferred, lactating  Heart: Regular rate and rhythm, S1 and S2 normal, no murmur, rub, or gallop  Abdomen: Soft, non-tender.  Incision well healed 1FB diastasis  Pelvic:\"Not indicated .  Extremities: Extremities normal without varicosities or edema       Last Pap: August 2021.. Results were: NIL and HPV Negative    Immunization History   Administered Date(s) Administered     COVID-19,PF,Moderna 05/07/2021, 06/04/2021, 01/16/2022     HPV 07/02/2008, 09/17/2008, 01/12/2009     HPV Quadrivalent 07/02/2008, 09/17/2008, 01/12/2009     HepB 05/02/2000     HepB, Unspecified 05/02/2000     Hib " (PRP-T) 03/16/1990     Historical DTP/aP 1990, 1990, 1988, 07/08/1993     Influenza Vaccine IM > 6 months Valent IIV4 (Alfuria,Fluzone) 10/08/2021     MMR 05/26/1989, 07/08/1993, 05/02/2000     Meningococcal (Menactra ) 08/08/2006     OPV, trivalent, live 1990, 1990, 07/08/1993     TD (ADULT, 7+) 05/02/2000     TDAP Vaccine (Adacel) 10/04/2010     Td (Adult), Adsorbed 05/02/2000     Tdap (Adacel,Boostrix) 12/17/2021     Immunization status: up to date and documented    Time spent:  Chart review/Pre-charting on 04/28/22: 5 minutes  Face-to-face visit: 30 minutes with >50% on education, counseling and coordination of care.   Documentation:  5 minutes  Total time spent on day of service:  40  minutes       Barbara Lyon, DNP, APRN, CNM

## 2022-04-25 NOTE — PATIENT INSTRUCTIONS
POSTPARTUM INFORMATION AND RESOURCES:     Congratulations on the birth of your baby. We have gathered together some information on staying healthy in the postpartum time including information on exercise, nutrition and mental health. We have also listed some local and national resources for lactation support and mental health support.     If you have questions or concerns and need to speak with a midwife immediately, please call the on-call midwife at [phone].     If you have a non-emergent question or would like to schedule a follow up appointment, please call the clinic midwife at [phone].     Thank you for sharing your birth experience with the [facility].  Congratulations on the birth of your baby!     ---------------------------------------------------------------------------------------------------------  EXERCISE & NUTRITION:      Getting and Staying Active: A Healthy You!   -Why should I exercise? Exercise, being physically active, is very important for all women. Being active can help you:   Lose or maintain weight   Have more energy   Sleep better   Enjoy sex more   Relieve stress and think better   Lower the chance you will have heart disease, high blood pressure, and diabetes   Strengthen your bones and muscles   Have fewer hot flashes if you are older   -How active do I have to be to get the bene?ts of exercise?  Studies show that as little as 15 minutes of moderate exercise--like fast walking or dancing--3 times a week can improve the health of your heart. Ifyou want to get the best benefits from exercise, try to increase your physical activity to at least 30 minutes, 5 times a week. If you have a serious health problem,be sure to talk with your health care provider before starting an exercise program.   Https://Altimet/  Http://www.AB Tasty/     @[email or web address]  @MyPelvicFloorMuscles  @The.[email or web address].Karlie     Taking Care of your Health: Health Care Maintenance  Screening recommendations   In all the things women do, taking care of everything and everybody else, they sometimes forget to take care of themselves. This handout reviews the health screenings and vaccines that are recommended for women of all ages. Talk with yourhealth care provider about which tests and vaccines you need. The chart below lists the screening tests and vaccinations recommended for healthy women who do not have a high risk for most diseases.   The recommendations in thischart are guidelines only. For some tests and vaccines, the chart says you should talk to your health care provider.This is because the best recommendations for you depend on your personal health history. Your health care provider may suggest more frequent testing or additional tests ifyou havea higher chance of getting some diseases      Planning Your Family: Developing a Reproductive Life Plan   Planning ahead can help you avoid getting pregnant when you don t want to be pregnant and also be in good health if and when you do decide to become pregnant. Many women have at least one pregnancy in their lives, even if it was not planned. In fact, about half of all pregnancies are not planned. Getting pregnant when you did not plan it can be a problem, or it can turn into a happy event. Planning pregnancy leads to healthier pregnancies, healthier mothers, and healthier families.   Although many women want to have a family, not everyone wants to have children. More and more women are childless by choice (also known as childfree). Whether to have children is a personal choice that only you can make. It s okay not to want children! If you never want to get pregnant, it is important to make sure you always use very effective birth control, such as an intrauterine device, the birth control implant, female sterilization (having your tubes tied), or your partner having a vasectomy.      Reliable resources:   ?   American College of  "Nurse-Midwives (ACNM) http://www.midwife.org/; look at the informational handouts at http://www.midwife.org/Share-With-Women   ??   Women's Health.gov:   http://www.womenshealth.gov/a-z-topics/index.html   FDA - Nutrition ?www.mypyramid.gov? Under \"For Consumers,\" click on \"pregnant and breastfeeding women.\"   ?   Vaccines : Centers for Disease Control and Prevention (CDC) http://www.cdc.gov/vaccines/   ?   [facility] www.HerefordWeStore.KellBenx   ?   When researching information on the web, question the validity of websites.? The Aspida .gov, [employer] andNeopolitan Networksorg tend to be more reliable information.? If there are a lot of advertisements, be cautious of the information provided. Stay away from blogs and chat rooms please!   ?   ?   Nutrition and supplements:   Daily multivitamin vitamin (with 400 - 1000 mcg folic acid).? Take with food as needed.   ?   4-5 servings of dairy or other calcium rich foods (fish, leafy greens, soy)?per day - if not, take 500-1000 mg additional calcium (Tums, pills, chews). Take with dairy.   ?   Vitamin D3 4000 IU geltab daily. Vitamin D rich foods: Cod Liver Oil (1Tbsp), [place]ridge, Mackerel, Tuna, fortified milk and yogurt, Beef and liver, sardines, egg, fortified cereals, swiss cheese.? Take supplements with fattiest meal.?   ?   2-3 (4) oz servings of fish, seafood, nuts (walnuts & almonds), oils, avocado per week - if not, take Omega 3 Fatty acids: DHA & CASS 6939-6752 mg per day. ?Other names: cod liver oil, fish oil. Take with fattiest meal.   ?   ?---------------------------------------------------------------------------------------------------------  POSTPARTUM & LACTATION RESOURCES :     Virtual Breastfeeding Support:     During this time of isolation, breastfeeding families need even more community!  Here are some area organizations offering virtual support groups for breastfeeding:     [facility] Cafe Support Group, Tuesdays at 10:30 am              Run by TABITHA Moreau of [employer] " "Lactation Consultants              Go to [employer] Lactation Consultants Facebook page and click on \"events\" for link              https://www.facebook.com/events/729143038955008/  [facility] Milk Hour,  at 2:30 pm               Run by TABITHA Tam              Go to [facility] FB page and send message to get link              https://www.Glycos Biotechnologies.Insmed/healthfoundations/  [facility] holding virtual meetings the first Tuesday of each month, 8-9 pm, and the   Third Saturday, 10 - 11 am.  Go to [facility] and [place] FB page; message to get link [email or web address] [email or web address]/?hc_location=[facility] offers a Lactation Lounge every Friday 12pm - 1pm, run by Venita Lawson [facility] Leader              Sign up via link at Highcon/cbe-lactation              https://www.Highcon/cbe-lactation  [facility] is offering virtual support groups every Monday, 10:30 am - 12 pm, run by nurse IBCLC    Https://www.facebook.com/events/897703898717835/     Prenatal Breastfeeding Classes:       [employer] is offering virtual breastfeeding and  care classes:  https://www.Highcon/education-workshops  BirthEd childbirth and breastfeeding education offering virtual prenatal breastfeeding classes  https://www.birthedmn.com/workshops     Breastfeeding:   OUTPATIENT LACTATION RESOURCES   -Schedule an appointment with a [facility] BRITTON who is also a Lactation Consultant by calling [phone]. Arcelia LU, CNM at [facility] on Monday, Manuela Mariscal CNM at [facility] on  and [facility] on .   [facility] Lactation Clinics located at [facility] and [facility]   Call: [phone].   Inpatient support   Outpatient appointments   Telephone consultation   Breast-feeding classes available through " [employer]      [facility] health improvement partnership:                                [facility] Baby Café  Due to COVID-19, all Baby Café sessions are canceled until further notice. For lactation support, please contact one of our bilingual staff:  Elida (IBCLC) [phone]  Na (IBCLC/ Belarusian) [phone]  Zoshira (Hmong) [phone]  Ajay (Honduran) [phone]  Baby Café is a free, drop-in service offering breastfeeding/chestfeeding support. Come share tips and socialize with other pregnant, breastfeeding/chestfeeding families. Babies and siblings are welcome (no  available).  We offer:  Professionally trained lactation staff.  Resource books for lending.  Relaxed and fun atmosphere.  Refreshments.  Locations  Baby Café is offered at several locations.  Please see below for the Baby Café closest to you.  CANCELED UNTIL FURTHER NOTICE  [facility]  [address] [address]  1st Wednesdays of the month   10 a.m. - Noon  CANCELED UNTIL FURTHER NOTICE  [facility] [address], [place], [address]  4th Wednesdays of the month   10 a.m. - 12:30 p.m.     CANCELED UNTIL FURTHER NOTICE  [facility]  [address], [place], [address]  4th Wednesdays of the month  4 - 6 p.m.  CANCELED UNTIL FURTHER NOTICE  [employer] [facility] [address], [place], [address]  2nd Thursdays of the month.  9:30-11:30 a.m.  Enter through the east end of the building, the blue Door C.  Ring the ECFE buzzer to be let in.   More information  Na Fang  [phone]  [email or web address].      -[facility]:  www.[facility] Doctors HospitalCulture Machine.Magnum Hunter Resources   -Attend a baby weigh in at [facility]. Lactation consultants are available to answer questions   [facility]: Tuesdays 1:00 - 2:00   [facility]: Mondays 1:00 - 2:00   -Attend a New Mamma group or a Second Time Mama group at [facility].      -Enlightened  "Mama: www.Prestodiagenedmama.Greendizer   -Attend one of the New Mama groups at [facility] in [facility]. [facility] also offers one-on-one in home and in office lactation consults.      -Doug: www.chad.org/   -Attend a Ping [place] meeting. Multiple groups in several locations throughout the [facility]. The meetings are no-cost and always informative breastfeeding education session through [employer] La Leche League              Held at [facility] the second Thursday of each month at 7pm     - Information on medication use while breastfeeding: http://toxnet.nlm.nih.gov/newtoxnet/lactmed.htm      Other Online Breastfeeding Resources:   [facility].org/baby sign up for free online weekly e-mail   [facility]altheast.org/maternity   Breastfeedingmadesimple.com   Llli.org (La Leche League)   Normalfed.com   WomenRiddle Hospitalth.gov/breastfeeding   Workandpump.com   [employer]  https://Kareo/abcs/   Click on \"Learn More About Attachment\"     -New Parent Connection Drop-In:  In collaboration with [facility], Early Childhood Family Education (ECFE), a program of [address], offers ongoing classes for new parents in their infants.  The connection classes offer support and resources to parents during the exciting and challenging period of transition following the birth of her baby.  Parents and babies (birth to 6 months of age) may join the group at any time.  Baby's birth to 3 months meet Tuesdays, from 1230 to 1:30 PM  Babies 3-6 months meet Tuesdays, from 4 to 5 PM     -[employer] New Mama Group: www.BlenderHouse/free-new-mama-group  -Attend [employer]DC MediaHardMetricss Free New Mama. Groups located at three locations:  [facility] and [place]. Sign up online.         Additional Resources:?   -American College of Nurse-Midwives (ACNM) http://www.midwife.org/; look at the informational handouts at http://www.midwife.org/Share-With-Women  www.mymidwife.org   ?   -Women's Health.gov: "   http://www.womenshealth.gov/a-z-topics/index.html   ?   -Early Childhood and Family Education (ECFE):   ECFE offers parents hands-on learning experiences that will nourish a lifetime of teachable moments.   http://ecfe.info/ecfe-home/         -----------------------------------------------------------------------------------------------------------   (Before, during and after pregnancy)   MOOD DISORDER RESOURCES :  Are you feeling sad or depressed?   Do you feel more irritable or angry with those around you?   Are you having difficulty bonding with your baby?   Do you feel anxious or panicky?   Are you having problems with eating or sleeping?   Are you having upsetting thoughts that you can t get out of your mind?   Do you feel as if you are  out of control  or  going crazy ?   Do you feel like you never should have become a mother?   Are you worried that you might hurt your baby or yourself?     Any of these symptoms, and many more, could indicate that you have a form of  mood or anxiety disorder, such as postpartum depression. While many women experience some mild mood changes during or after the birth of a child, 15 to 20% of women experience more significant symptoms of depression or anxiety. Please know that with informed care you can prevent a worsening of these symptoms and can fully recover. There is no reason to continue to suffer.  Women of every culture, age, income level and race can develop  mood and anxiety disorders. Symptoms can appear any time during pregnancy and the first 12 months after childbirth. There are effective and well-researched treatment options to help you recover. Although the term  postpartum depression  is most often used, there are actually several forms of illness that women may experience.     Resources:     -Pregnancy and Postpartum Support [place]: www.[facility] Hospitalupportmn.org  Who We Are: We are a group of mental health &  practitioners, service  "organizations, and mother volunteers who provides services to those struggling with a pregnancy, loss, or postpartum mood disorder through the Helpline, professional training, our resource list and website.  What We Do: We provide support, advocacy, awareness, and training about  mental health in [place].      Community Resource List:   This is a list of  resources within our community.   http://ppsupportmn.org/communityresourcelist    PSYCHOTHERAPISTS/CONSULTANTS   This is a list of licensed mental health professionals who have advanced clinical skills in the treatment of postpartum mood and anxiety disorders (PMADs).   Http://eCaringupportmn.org/mentalhealthproviderresourcelist   INTEGRATIVE MEDICINE PRACTITIONERS:   This is a list of licensed providers who practice Integrative Medicine: a form of treatment that combines alternative medicine with evidence based medicine in an effort to treat the  whole person  (the person, not just the disease).   http://eCaringupportmn.org/integrativemedicineproviders    PSYCHIATRIC CARE   This is a list of licensed Psychiatrists who have advanced clinical skills in the treatment and medication management for PMADs and lactating mothers.   Http://eCaringupportmn.org/psychiatryproviderresroucelist   Click on the links below to find detailed profiles and contact information of providers who have been screened and approved as having advanced training in the area of PMADs. Please note: [facility] does not endorse a specific provider.   The list is in alphabetical order by city. You can also search for providers by city or zip code using the search box. Please click on the \"Show\" box to the upper right to advance to the next page. You may also call our Helpline at [phone] if you would like for our Helpline volunteer to find a provider for you.     -Postpartum Depression Support Group:   Weekly groups at no cost through [facility], , " 1:30-3:00 p.m., at [facility], [address] [facility], [address]. [place], , 1:30-3:00 p.m., at [facility], [address]. To register for the group or get more information, call [phone].   -Postpartum Depression Support Group:   Outpatient Intensive Treatment program for women with  mood disorders [facility] Mother/Baby Program     -[facility]  Resource Guide      Women s Mental Health at [facility]  This website provides a range of current information including discussion of new research findings in women s mental health and how such investigations inform day-to-day clinical practice. Despite the growing number of studies being conducted in women s health, the clinical implications of such work are frequently controversial, leaving patients with questions regarding the most appropriate path to follow. Providing these resources to patients and their doctors so that individual clinical decisions can be made in a thoughtful and collaborative fashion dovetails with the mission of our Center.     https://womensmentalhealth.org/        If you re having thoughts of harming yourself or your baby, it is vital to get support immediately. Call 911 or go to the nearest E.R.     TOLL-FREE / NATIONWIDE EMERGENCY ASSISTANCE   Immediate Emergency:  911   National Suicide Prevention Lifeline:   1-343.686.8516   Suicide Prevention Hotline:   0-687-HEUXPHB   National Postpartum Depression Hotline:   -PPD-MOMS   Postpartum Support International (PSI)   PPD Helpline: (not a 24-hour hotline)   1-808.751.9611

## 2022-04-28 ENCOUNTER — PRENATAL OFFICE VISIT (OUTPATIENT)
Dept: MIDWIFE SERVICES | Facility: CLINIC | Age: 32
End: 2022-04-28
Payer: COMMERCIAL

## 2022-04-28 VITALS
HEIGHT: 64 IN | SYSTOLIC BLOOD PRESSURE: 112 MMHG | BODY MASS INDEX: 31.92 KG/M2 | RESPIRATION RATE: 16 BRPM | WEIGHT: 187 LBS | HEART RATE: 64 BPM | DIASTOLIC BLOOD PRESSURE: 74 MMHG

## 2022-04-28 DIAGNOSIS — Z98.891 HISTORY OF CESAREAN SECTION: ICD-10-CM

## 2022-04-28 DIAGNOSIS — D62 ANEMIA DUE TO BLOOD LOSS, ACUTE: ICD-10-CM

## 2022-04-28 PROBLEM — Z34.03 ENCOUNTER FOR SUPERVISION OF NORMAL FIRST PREGNANCY IN THIRD TRIMESTER: Status: RESOLVED | Noted: 2021-08-27 | Resolved: 2022-04-28

## 2022-04-28 PROBLEM — O36.8390 NON-REASSURING FETAL HEART RATE OR RHYTHM AFFECTING MANAGEMENT OF MOTHER: Status: RESOLVED | Noted: 2022-03-15 | Resolved: 2022-04-28

## 2022-04-28 PROBLEM — O48.0 POST-TERM PREGNANCY, 40-42 WEEKS OF GESTATION: Status: RESOLVED | Noted: 2022-03-15 | Resolved: 2022-04-28

## 2022-04-28 LAB — HGB BLD-MCNC: 13 G/DL (ref 11.7–15.7)

## 2022-04-28 PROCEDURE — 36415 COLL VENOUS BLD VENIPUNCTURE: CPT | Performed by: ADVANCED PRACTICE MIDWIFE

## 2022-04-28 PROCEDURE — 85018 HEMOGLOBIN: CPT | Performed by: ADVANCED PRACTICE MIDWIFE

## 2022-11-19 ENCOUNTER — HEALTH MAINTENANCE LETTER (OUTPATIENT)
Age: 32
End: 2022-11-19

## 2022-12-13 ENCOUNTER — IMMUNIZATION (OUTPATIENT)
Dept: FAMILY MEDICINE | Facility: CLINIC | Age: 32
End: 2022-12-13
Payer: COMMERCIAL

## 2022-12-13 DIAGNOSIS — Z23 HIGH PRIORITY FOR 2019-NCOV VACCINE: Primary | ICD-10-CM

## 2022-12-13 PROCEDURE — 99207 PR NO CHARGE NURSE ONLY: CPT

## 2022-12-13 PROCEDURE — 0134A COVID-19 VACCINE BIVALENT BOOSTER 18+ (MODERNA): CPT

## 2022-12-13 PROCEDURE — 91313 COVID-19 VACCINE BIVALENT BOOSTER 18+ (MODERNA): CPT

## 2023-04-09 ENCOUNTER — HEALTH MAINTENANCE LETTER (OUTPATIENT)
Age: 33
End: 2023-04-09

## 2023-09-11 ASSESSMENT — ENCOUNTER SYMPTOMS
SHORTNESS OF BREATH: 0
EYE PAIN: 0
DIZZINESS: 0
PALPITATIONS: 0
HEARTBURN: 0
FREQUENCY: 0
MYALGIAS: 0
PARESTHESIAS: 0
NAUSEA: 0
CHILLS: 0
SORE THROAT: 0
HEMATOCHEZIA: 0
DIARRHEA: 0
NERVOUS/ANXIOUS: 0
JOINT SWELLING: 0
ABDOMINAL PAIN: 0
CONSTIPATION: 0
COUGH: 0
HEADACHES: 0
BREAST MASS: 0
ARTHRALGIAS: 0
HEMATURIA: 0
FEVER: 0
WEAKNESS: 0
DYSURIA: 0

## 2023-09-18 ENCOUNTER — OFFICE VISIT (OUTPATIENT)
Dept: FAMILY MEDICINE | Facility: CLINIC | Age: 33
End: 2023-09-18
Payer: COMMERCIAL

## 2023-09-18 VITALS
TEMPERATURE: 98.9 F | HEIGHT: 63 IN | SYSTOLIC BLOOD PRESSURE: 113 MMHG | OXYGEN SATURATION: 98 % | RESPIRATION RATE: 16 BRPM | WEIGHT: 190 LBS | BODY MASS INDEX: 33.66 KG/M2 | HEART RATE: 73 BPM | DIASTOLIC BLOOD PRESSURE: 76 MMHG

## 2023-09-18 DIAGNOSIS — Z00.00 ROUTINE GENERAL MEDICAL EXAMINATION AT A HEALTH CARE FACILITY: Primary | ICD-10-CM

## 2023-09-18 DIAGNOSIS — E78.5 HYPERLIPIDEMIA, UNSPECIFIED HYPERLIPIDEMIA TYPE: ICD-10-CM

## 2023-09-18 PROCEDURE — 80061 LIPID PANEL: CPT | Performed by: FAMILY MEDICINE

## 2023-09-18 PROCEDURE — 36415 COLL VENOUS BLD VENIPUNCTURE: CPT | Performed by: FAMILY MEDICINE

## 2023-09-18 PROCEDURE — 99395 PREV VISIT EST AGE 18-39: CPT | Performed by: FAMILY MEDICINE

## 2023-09-18 RX ORDER — MULTIPLE VITAMINS W/ MINERALS TAB 9MG-400MCG
TAB ORAL
COMMUNITY

## 2023-09-18 ASSESSMENT — ENCOUNTER SYMPTOMS
HEADACHES: 0
PARESTHESIAS: 0
DIARRHEA: 0
HEARTBURN: 0
SORE THROAT: 0
WEAKNESS: 0
PALPITATIONS: 0
FEVER: 0
DYSURIA: 0
EYE PAIN: 0
CHILLS: 0
ARTHRALGIAS: 0
COUGH: 0
ABDOMINAL PAIN: 0
NAUSEA: 0
JOINT SWELLING: 0
FREQUENCY: 0
HEMATOCHEZIA: 0
CONSTIPATION: 0
MYALGIAS: 0
HEMATURIA: 0
SHORTNESS OF BREATH: 0
BREAST MASS: 0
DIZZINESS: 0
NERVOUS/ANXIOUS: 0

## 2023-09-18 NOTE — PROGRESS NOTES
SUBJECTIVE:   CC: Melody is an 33 year old who presents for preventive health visit.       2023     5:12 PM   Additional Questions   Roomed by as   Accompanied by self         2023     5:12 PM   Patient Reported Additional Medications   Patient reports taking the following new medications no     HPI: Melody is a 33-year-old female presenting today for an annual physical exam.  She and her  have decided to try for another child.  She has no specific complaints or concerns today.    Healthy Habits:     Getting at least 3 servings of Calcium per day:  Yes    Bi-annual eye exam:  Yes    Dental care twice a year:  Yes    Sleep apnea or symptoms of sleep apnea:  None    Diet:  Regular (no restrictions)    Frequency of exercise:  2-3 days/week    Duration of exercise:  15-30 minutes    Taking medications regularly:  Yes    Medication side effects:  Not applicable    Additional concerns today:  No      Today's PHQ-2 Score:       2023    10:07 AM   PHQ-2 (  [employer])   Q1: Little interest or pleasure in doing things 0   Q2: Feeling down, depressed or hopeless 0   PHQ-2 Score 0   Q1: Little interest or pleasure in doing things Not at all   Q2: Feeling down, depressed or hopeless Not at all   PHQ-2 Score 0               Have you ever done Advance Care Planning? (For example, a Health Directive, POLST, or a discussion with a medical provider or your loved ones about your wishes): Yes, advance care planning is on file.    Social History     Tobacco Use    Smoking status: Never    Smokeless tobacco: Never   Substance Use Topics    Alcohol use: Yes     Comment: Occasionally             2023     5:15 PM   Alcohol Use   Prescreen: >3 drinks/day or >7 drinks/week? No     Reviewed orders with patient.  Reviewed health maintenance and updated orders accordingly - Yes  Patient Active Problem List   Diagnosis    BMI 32.0-32.9,adult    History of  section    Hyperlipidemia, unspecified  "hyperlipidemia type     Past Surgical History:   Procedure Laterality Date    ABDOMEN SURGERY  3/16/2022        AS BIOPSY SKIN/SUBQ/MUC MEM, SINGLE LESION      mole removal     SECTION N/A 2022    Procedure:  SECTION;  Surgeon: Ashley Rae MD;  Location: [facility] TOOTH EXTRACTION W/FORCEP         Social History     Tobacco Use    Smoking status: Never    Smokeless tobacco: Never   Substance Use Topics    Alcohol use: Yes     Comment: Occasionally     Family History   Problem Relation Age of Onset    Arthritis Mother     Cerebrovascular Disease Father 68        TIA    Dementia Maternal Grandfather     Macular Degeneration Maternal Grandfather     No Known Problems Sister     No Known Problems Sister     Alcoholism Maternal Uncle     Cerebrovascular Disease Paternal Uncle     Melanoma No family hx of          Current Outpatient Medications   Medication Sig Dispense Refill    multivitamin w/minerals (MULTI-VITAMIN) tablet        Allergies   Allergen Reactions    Sulfa Antibiotics     Vicodin [Acetaminophen] Other (See Comments) and Nausea and Vomiting     Pt states she had \"withdrawals\" and felt \"terrible.\"     Adhesive Tape Rash    Bacitracin Rash     Patient developed rash with use of bacitracin and had positive reaction with true test    Nickel Rash     Positive reaction with true test.        Breast Cancer Screenin/11/2023     5:16 PM   Breast CA Risk Assessment (FHS-7)   Do you have a family history of breast, colon, or ovarian cancer? No / Unknown       Patient under 40 years of age: Routine Mammogram Screening not recommended.   Pertinent mammograms are reviewed under the imaging tab.    History of abnormal Pap smear: NO - age 30-65 PAP every 5 years with negative HPV co-testing recommended      Latest Ref Rng & Units 2021    11:13 AM 2016    12:00 AM 10/31/2012    10:21 AM   PAP / HPV   PAP  Negative for Intraepithelial Lesion or Malignancy " "(NILM)      PAP (Historical)   NIL  NIL    HPV 16 DNA Negative Negative      HPV 18 DNA Negative Negative      Other HR HPV Negative Negative        Reviewed and updated as needed this visit by clinical staff   Tobacco  Allergies               Reviewed and updated as needed this visit by Provider                     Review of Systems   Constitutional:  Negative for chills and fever.   HENT:  Negative for congestion, ear pain, hearing loss and sore throat.    Eyes:  Negative for pain and visual disturbance.   Respiratory:  Negative for cough and shortness of breath.    Cardiovascular:  Negative for chest pain, palpitations and peripheral edema.   Gastrointestinal:  Negative for abdominal pain, constipation, diarrhea, heartburn, hematochezia and nausea.   Breasts:  Negative for tenderness, breast mass and discharge.   Genitourinary:  Negative for dysuria, frequency, genital sores, hematuria, pelvic pain, urgency, vaginal bleeding and vaginal discharge.   Musculoskeletal:  Negative for arthralgias, joint swelling and myalgias.   Skin:  Negative for rash.   Neurological:  Negative for dizziness, weakness, headaches and paresthesias.   Psychiatric/Behavioral:  Negative for mood changes. The patient is not nervous/anxious.           OBJECTIVE:   /76 (BP Location: Left arm, Patient Position: Left side, Cuff Size: Adult Large)   Pulse 73   Temp 98.9  F (37.2  C) (Oral)   Resp 16   Ht 1.6 m (5' 3\")   Wt 86.2 kg (190 lb)   LMP 09/09/2023 (Exact Date)   SpO2 98%   BMI 33.66 kg/m    Physical Exam  GENERAL: healthy, alert and no distress  EYES: Eyes grossly normal to inspection, PERRL and conjunctivae and sclerae normal  HENT: ear canals and TM's normal, nose and mouth without ulcers or lesions  NECK: no adenopathy, no asymmetry, masses, or scars and thyroid normal to palpation  RESP: lungs clear to auscultation - no rales, rhonchi or wheezes  BREAST: normal without masses, tenderness or nipple discharge and no " "palpable axillary masses or adenopathy  CV: regular rate and rhythm, normal S1 S2, no S3 or S4, no murmur, click or rub, no peripheral edema and peripheral pulses strong  ABDOMEN: soft, nontender, no hepatosplenomegaly, no masses and bowel sounds normal  MS: no gross musculoskeletal defects noted, no edema  SKIN: no suspicious lesions or rashes  NEURO: Normal strength and tone, mentation intact and speech normal  PSYCH: mentation appears normal, affect normal/bright    Labs reviewed in Epic    ASSESSMENT/PLAN:     Problem List Items Addressed This Visit          Endocrine    Hyperlipidemia, unspecified hyperlipidemia type     Previously had a high LDL cholesterol; rechecked lipid panel today.          Relevant Orders    Lipid Profile (Chol, Trig, HDL, LDL calc) (Completed)     Other Visit Diagnoses       Routine general medical examination at a health care facility    -  Primary                  COUNSELING:  Reviewed preventive health counseling, as reflected in patient instructions       Regular exercise       Healthy diet/nutrition       Family planning       Folic Acid       Osteoporosis prevention/bone health       Colorectal Cancer Screening      BMI:   Estimated body mass index is 33.66 kg/m  as calculated from the following:    Height as of this encounter: 1.6 m (5' 3\").    Weight as of this encounter: 86.2 kg (190 lb).   Weight management plan: Discussed healthy diet and exercise guidelines      She reports that she has never smoked. She has never used smokeless tobacco.      DUDLEY LEVY MD  [facility]  "

## 2023-09-19 LAB
CHOLEST SERPL-MCNC: 233 MG/DL
HDLC SERPL-MCNC: 49 MG/DL
LDLC SERPL CALC-MCNC: 115 MG/DL
NONHDLC SERPL-MCNC: 184 MG/DL
TRIGL SERPL-MCNC: 344 MG/DL

## 2023-09-20 PROBLEM — E78.5 HYPERLIPIDEMIA, UNSPECIFIED HYPERLIPIDEMIA TYPE: Status: ACTIVE | Noted: 2023-09-20

## 2023-10-11 ENCOUNTER — OFFICE VISIT (OUTPATIENT)
Dept: URGENT CARE | Facility: URGENT CARE | Age: 33
End: 2023-10-11
Payer: COMMERCIAL

## 2023-10-11 ENCOUNTER — E-VISIT (OUTPATIENT)
Dept: URGENT CARE | Facility: CLINIC | Age: 33
End: 2023-10-11

## 2023-10-11 VITALS
TEMPERATURE: 97.4 F | OXYGEN SATURATION: 100 % | HEART RATE: 68 BPM | BODY MASS INDEX: 33.13 KG/M2 | DIASTOLIC BLOOD PRESSURE: 68 MMHG | WEIGHT: 187 LBS | SYSTOLIC BLOOD PRESSURE: 116 MMHG
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
## 2023-10-11 DIAGNOSIS — H10.9 BACTERIAL CONJUNCTIVITIS OF LEFT EYE: Primary | ICD-10-CM

## 2023-10-11 DIAGNOSIS — Z53.9 DIAGNOSIS NOT YET DEFINED: Primary | ICD-10-CM

## 2023-10-11 PROCEDURE — 99213 OFFICE O/P EST LOW 20 MIN: CPT

## 2023-10-11 RX ORDER — CIPROFLOXACIN HYDROCHLORIDE 3.5 MG/ML
1-2 SOLUTION/ DROPS TOPICAL EVERY 4 HOURS
Qty: 10 ML | Refills: 0 | Status: SHIPPED | OUTPATIENT
Start: 2023-10-11 | End: 2023-10-18

## 2023-10-11 NOTE — PROGRESS NOTES
URGENT CARE  Assessment & Plan   Assessment:   Melody Babcock is a 33 year old female who's clinical presentation today is consistent with:   1. Bacterial conjunctivitis of left eye  2. Contact lens associated with adverse incident  - ciprofloxacin (CILOXAN) 0.3 % ophthalmic solution;   - Adult Eye  Referral; Future  Plan:  Will treat patient's symptoms today as a  bacterial conjunctivitis with topical antibiotic therapy at this time. Given she is a Contact lens wearers: flouroquinolone ordered and updated her she   - needs ophthalmological follow up in 24-48 hours - referral placed. Also discussed with her there could be something more serious going on such as a corneal ulcer or keratitis and eye follow up is essential.   Encouraged patient for pain they should use Ibuprofen and/or tylenol as needed. Additionally a cool or warm moist compresses over the affected eye, as needed may also help relieve the discomfort  Educated patient  on the infective/ contagious nature of this condition. Informed parent that child can return to work} in 24 hrs after antibiotic treatment. Encouraged strict hand washing, avoiding touching the eye, not sharing towels or bedding to prevent spread of infection.  Additionally we discussed if symptoms do not improve after starting today's treatment (or if symptoms worsen) to follow up in 3-5 days.  No alarm signs or symptoms present   Differential Diagnoses for this patient's chief complaint that I considered include:  Conjunctivitis: bacterial vs viral or allergic, foreign body, Dry eye, blepharitis, corneal abrasion, corneal ulcer, keratitis, uveitis/iritis, acute angle glaucoma, Hordeolum / chalazion / Stye, Contact lens abuse}     Patient is agreeable to treatment plan and state they will follow-up if symptoms do not improve and/or if symptoms worsen   see patient's AVS 'monitor for' section for specific patient instructions given and discussed regarding what to watch for  and when to follow up    GAUTAM [facility] [place]      ______________________________________________________________________      Subjective     HPI: Melody Babcock  is a 33 year old  female who presents today for evaluation the following concerns:   Patient ,  presents for evaluation of left  eye that they describe as having increased  redness, having slightly purulent/gritty drainage,and tearing  Patient denies any eye swelling or foreign body sensation  Patient states symptoms started today, yesterday, one day sago    Patient denies any incident or trauma to the eyes  Patient endorses  wearing contacts. Patient denies any other viral URI symptoms.}   Patient denies any Increasing eye pain or worsening/changes in vision/ visual acuity/ blurry vision. patient denies any redness of the eyelids or surrounding soft tissue/skin  Patient denies any  photosensitively, pain with eye movement.     Review of Systems:  Pertinent review of systems as reflected in HPI, otherwise negative.     Objective    Physical Exam:  Vitals:    10/11/23 1104   BP: 116/68   Pulse: 68   Temp: 97.4  F (36.3  C)   TempSrc: Tympanic   SpO2: 100%   Weight: 84.8 kg (187 lb)     General: Alert and oriented, no acute distress, Vital signs reviewed: afebrile,  Normotensive  Psy/mental status: Cooperative, nonanxious  SKIN: Intact, no rashes  EYES:   EOMs intact, PERRLA bilaterally   Conjunctiva: moderate to severe  injection and erythema to conjunctiva of left  eye.   Drainage: slight  tearing noted   But no  creamy/yellow drainage or mattering of lashes   No photophobia noted     No visual acuity changes noted, no blur vision noted  NOSE:  mucosa moist  MOUTH/THROAT: lips, tongue, & oral mucosa appear normal upon inspection   LUNG: normal work of breathing, good respiratory effort without retractions, good air  movement, non labored, inspection reveals normal chest expansion w/  inspiration        ______________________________________________________________________    I explained my diagnostic considerations and recommendations to the patient, who voiced understanding and agreement with the treatment plan.   All questions were answered.   We discussed potential side effects, risks and benefits of any prescribed or recommended therapies, as well as expectations for response to treatments.  Please see AVS for any patient instructions & handouts given.   Patient was advised to contact the Nurse Care Line, their Primary Care provider, Urgent Care, or the Emergency Department if there are new or worsening symptoms, or call 911 for emergencies.

## 2023-10-11 NOTE — PATIENT INSTRUCTIONS
Dear Melody Babcock,    We are sorry you are not feeling well. Based on the responses you provided, it is recommended that you be seen in-person in urgent care so we can better evaluate your symptoms. Please click here to find the nearest urgent care location to you.   You will not be charged for this Visit. Thank you for trusting us with your care.    Viridiana Gautam PA-C

## 2024-01-26 ENCOUNTER — E-VISIT (OUTPATIENT)
Dept: FAMILY MEDICINE | Facility: CLINIC | Age: 34
End: 2024-01-26
Payer: COMMERCIAL

## 2024-01-26 DIAGNOSIS — L50.9 HIVES: Primary | ICD-10-CM

## 2024-01-26 PROCEDURE — 99421 OL DIG E/M SVC 5-10 MIN: CPT | Performed by: FAMILY MEDICINE

## 2024-11-02 ENCOUNTER — HEALTH MAINTENANCE LETTER (OUTPATIENT)
Age: 34
End: 2024-11-02

## (undated) DEVICE — CUSTOM PACK C-SECTION LHE

## (undated) DEVICE — DRAPE IOBAN 2 C-SECTION 3M 6697

## (undated) DEVICE — PLATE GROUNDING ADULT W/CORD 9165L

## (undated) DEVICE — PAD INSERT MED PEACH 14X6.5 62550

## (undated) DEVICE — UNDERPAD 30X30 BULK 949B10

## (undated) DEVICE — GLOVE UNDER INDICATOR PI SZ 6.5 LF 41665

## (undated) DEVICE — DRESSING MEPILEX BORDER POST-OP 4X8

## (undated) DEVICE — DRESSING MEPILEX BORDER POST-OP 4X12

## (undated) DEVICE — PREP CHLORAPREP 26ML TINTED HI-LITE ORANGE 930815

## (undated) DEVICE — SURGICEL POWDER ABSORBABLE HEMOSTAT 3GM 3013SP

## (undated) DEVICE — SUTURE VICRYL 2-0 36IN CTX+ UND VCP979H

## (undated) DEVICE — SUCTION MANIFOLD NEPTUNE 2 SYS 1 PORT 702-025-000

## (undated) DEVICE — PACK MAJOR BASIN 673

## (undated) DEVICE — SU PLAIN 3-0 XLH  27" 52T

## (undated) DEVICE — LINER PRINTED RED HAZARD 24X24 A4824PRRO

## (undated) DEVICE — SUCTION KIWI VAC  VAC-6000M

## (undated) DEVICE — CUSTOM CATH LAB BASIN SET PACK 640PACK LHE SUTCNBPFCA

## (undated) DEVICE — GOWN IMPERVIOUS BREATHABLE SMART LG 89015

## (undated) DEVICE — ADAPTER REDUCER 7/8 TO 1/4 RF10210

## (undated) DEVICE — GLOVE BIOGEL PI ULTRATOUCH G SZ 6.5 42165

## (undated) DEVICE — SOL WATER IRRIG 1000ML BOTTLE 2F7114

## (undated) DEVICE — SOL NACL 0.9% IRRIG 1000ML BOTTLE 2F7124

## (undated) RX ORDER — BUPIVACAINE HYDROCHLORIDE 2.5 MG/ML
INJECTION, SOLUTION EPIDURAL; INFILTRATION; INTRACAUDAL
Status: DISPENSED
Start: 2022-03-16

## (undated) RX ORDER — FENTANYL CITRATE-0.9 % NACL/PF 10 MCG/ML
PLASTIC BAG, INJECTION (ML) INTRAVENOUS
Status: DISPENSED
Start: 2022-03-16

## (undated) RX ORDER — FENTANYL CITRATE 50 UG/ML
INJECTION, SOLUTION INTRAMUSCULAR; INTRAVENOUS
Status: DISPENSED
Start: 2022-03-16

## (undated) RX ORDER — MORPHINE SULFATE 1 MG/ML
INJECTION, SOLUTION EPIDURAL; INTRATHECAL; INTRAVENOUS
Status: DISPENSED
Start: 2022-03-16